# Patient Record
Sex: MALE | Race: WHITE | NOT HISPANIC OR LATINO | URBAN - METROPOLITAN AREA
[De-identification: names, ages, dates, MRNs, and addresses within clinical notes are randomized per-mention and may not be internally consistent; named-entity substitution may affect disease eponyms.]

---

## 2023-04-01 PROCEDURE — 99308 SBSQ NF CARE LOW MDM 20: CPT | Performed by: INTERNAL MEDICINE

## 2023-05-07 ENCOUNTER — NURSING HOME VISIT (OUTPATIENT)
Dept: PRIMARY CARE | Facility: CLINIC | Age: 73
End: 2023-05-07
Payer: MEDICARE

## 2023-05-07 DIAGNOSIS — E11.9 DIABETES MELLITUS WITHOUT COMPLICATION (MULTI): ICD-10-CM

## 2023-05-07 DIAGNOSIS — F32.89 OTHER DEPRESSION: ICD-10-CM

## 2023-05-07 DIAGNOSIS — K21.9 GASTROESOPHAGEAL REFLUX DISEASE WITHOUT ESOPHAGITIS: ICD-10-CM

## 2023-05-07 DIAGNOSIS — I48.91 ATRIAL FIBRILLATION, UNSPECIFIED TYPE (MULTI): Primary | ICD-10-CM

## 2023-05-07 DIAGNOSIS — R53.1 ASTHENIA: ICD-10-CM

## 2023-05-07 DIAGNOSIS — I10 BENIGN HYPERTENSION: ICD-10-CM

## 2023-05-07 PROCEDURE — 99308 SBSQ NF CARE LOW MDM 20: CPT | Performed by: INTERNAL MEDICINE

## 2023-05-17 NOTE — PROGRESS NOTES
NAME OF THE PATIENT: Lucas Valderrama    YOB: 1950    PLACE OF SERVICE:  [facility]    This is a subsequent visit.    HPI:  Mr. Lucas Valderrama is a 73-year-old male with history of depression and atrial fibrillation.  He suffers from diabetes.  He has generalized weakness and deconditioning and unable to care for himself.  He requires supportive care.    PAST MEDICAL HISTORY:  As per nursing home list.  Hypertension, GERD, diabetes, wound dehiscence, atrial fibrillation.    CURRENT MEDICATIONS:  As per nursing home list.  Reviewed.    ALLERGIES:  As per nursing home list.  Allergies to Zetia.    SOCIAL HISTORY:  As per nursing home list.  The patient denies use of alcohol or tobacco at this time.    FAMILY HISTORY:  As per nursing home list.  Positive history of hypertension in this patient's family.    REVIEW OF SYSTEMS:  All 12 systems reviewed and pertaining covered in history and physical, the rest are negative.  The patient denies any fevers, chills, or chest pain at this time.    PHYSICAL EXAMINATION  GENERAL:  This is a well-developed, well-nourished male, lying in bed, appearing weak and lethargic.  VITAL SIGNS:  As recorded and reviewed from EMR.  Blood pressure 130/80.  Pulse 84.  Respirations 16.  Temperature 97.8.  EYES:  The patient's sclerae white.  The pupils were equal and round.  ENT:  The patient's external ears were normal and the otoscopic examination was negative.  NECK:  Supple.  There were no palpable masses.  Thyroid was not enlarged and there were no carotid bruits.  RESPIRATORY:  The patient had normal inspirations and expirations.  The breath sounds were equal bilaterally and clear to auscultation.  CARDIOVASCULAR:  Heart shows irregularly irregular rhythm.  GASTROINTESTINAL:  There was no hepatosplenomegaly.  There were no palpable masses and no inguinal nodes.  LYMPHATIC:  The patient had no axillary, groin, or lymphadenopathy.  MUSCULOSKELETAL:  The patient had  normal gait.  The joints appeared to be normal without evidence of deformity.  Grossly the muscles had good range of motion and were without effusion.  EXTREMITIES:  There was no evidence of peripheral edema.  NEUROLOGIC:  The patient had normal cranial nerves.  The reflexes, sensory, and motor examination were grossly within normal limits.  PSYCHIATRIC: The patient had normal judgment and insight.  The patient was oriented to person, place, and time, and had no obvious mood defect including depression, anxiety, and/or agitation.    LAB WORK:  Laboratory studies reviewed from prior visit.    ASSESSMENT AND PLAN:  Atrial fibrillation, rate controlled.  Depression, on medication.  Diabetes, on insulin.  Hypertension, med controlled.  Reflux disease, on PPI.  Weakness, on PT and OT.  Fall risk, on fall precaution.  Wheelchair dependence.  Medication list reviewed and discussed with the family.  Hospital chart reviewed.

## 2023-06-03 ENCOUNTER — NURSING HOME VISIT (OUTPATIENT)
Dept: POST ACUTE CARE | Facility: EXTERNAL LOCATION | Age: 73
End: 2023-06-03
Payer: OTHER GOVERNMENT

## 2023-06-03 DIAGNOSIS — R41.89 COGNITIVE IMPAIRMENT: ICD-10-CM

## 2023-06-03 DIAGNOSIS — K21.9 GASTROESOPHAGEAL REFLUX DISEASE WITHOUT ESOPHAGITIS: ICD-10-CM

## 2023-06-03 DIAGNOSIS — F32.89 OTHER DEPRESSION: Primary | ICD-10-CM

## 2023-06-03 DIAGNOSIS — R53.1 ASTHENIA: ICD-10-CM

## 2023-06-03 DIAGNOSIS — E11.9 DIABETES MELLITUS WITHOUT COMPLICATION (MULTI): ICD-10-CM

## 2023-06-03 DIAGNOSIS — I10 BENIGN HYPERTENSION: ICD-10-CM

## 2023-06-03 DIAGNOSIS — I48.91 ATRIAL FIBRILLATION, UNSPECIFIED TYPE (MULTI): ICD-10-CM

## 2023-06-03 PROCEDURE — 99308 SBSQ NF CARE LOW MDM 20: CPT | Performed by: INTERNAL MEDICINE

## 2023-06-03 NOTE — LETTER
Patient: Lucas Valderrama  : 1950    Encounter Date: 2023    NAME OF THE PATIENT: Lucas Valderrama     YOB: 1950    PLACE OF SERVICE:  [facility]    This is a subsequent visit.    HPI:  Mr. Lucas Valderrama is a 73-year-old male with history of depression and atrial fibrillation.  He suffers from weakness and deconditioning.  He is unable to care for himself.  He requires supportive care.    PAST MEDICAL HISTORY:  As per nursing home list. Hypertension, GERD, diabetes, wound dehiscence, atrial fibrillation.    CURRENT MEDICATIONS: As per nursing home list. Reviewed.    ALLERGIES: As per nursing home list. Allergies to Zetia.    SOCIAL HISTORY: As per nursing home list. The patient denies use of alcohol or tobacco at this time.    FAMILY HISTORY: As per nursing home list. Positive history of hypertension in this patient's family.    REVIEW OF SYSTEMS: All 12 systems reviewed and pertaining covered in history and physical, the rest are negative. The patient denies any fevers, chills, or chest pain at this time.    PHYSICAL EXAMINATION  GENERAL:  This is a well-developed, well-nourished male, sitting in a chair, in no acute distress.  VITAL SIGNS:  As recorded and reviewed from EMR.  Blood pressure 124/74.  Pulse 78.  Respirations 16.  Temperature 97.6.  EYES:  The patient's sclerae white.  The pupils were equal and round.  ENT:  The patient's external ears were normal and the otoscopic examination was negative.  NECK:  Supple.  There were no palpable masses.  Thyroid was not enlarged and there were no carotid bruits.  RESPIRATORY:  The patient had normal inspirations and expirations.  The breath sounds were equal bilaterally and clear to auscultation.  CARDIOVASCULAR:  Heart shows irregularly irregular rhythm.  GASTROINTESTINAL:  There was no hepatosplenomegaly.  There were no palpable masses and no inguinal nodes.  LYMPHATIC:  The patient had no axillary, groin, or  lymphadenopathy.  MUSCULOSKELETAL:  The patient had normal gait.  The joints appeared to be normal without evidence of deformity.  Grossly the muscles had good range of motion and were without effusion.  EXTREMITIES:  There was no evidence of peripheral edema.  NEUROLOGIC:  The patient had normal cranial nerves.  The reflexes, sensory, and motor examination were grossly within normal limits.  PSYCHIATRIC: The patient had normal judgment and insight.  The patient was oriented to person, place, and time, and had no obvious mood defect including anxiety, and/or agitation.    LAB WORK:  Laboratory studies were reviewed from prior visit.    ASSESSMENT AND PLAN:  Depression, on medication.  Atrial fibrillation, rate controlled.  Diabetes, on insulin.  Hypertension, med controlled.  Cognitive decline, on supportive care.  Reflux disease, on PPI.  Weakness, on PT and OT.  Fall risk, on fall precaution.  Medication list reviewed and discussed with the family.  Hospital chart reviewed.       Electronically Signed By: Santiago Hernandez MD   6/22/23  6:09 PM

## 2023-06-11 ENCOUNTER — NURSING HOME VISIT (OUTPATIENT)
Dept: POST ACUTE CARE | Facility: EXTERNAL LOCATION | Age: 73
End: 2023-06-11
Payer: OTHER GOVERNMENT

## 2023-06-11 DIAGNOSIS — F32.89 OTHER DEPRESSION: Primary | ICD-10-CM

## 2023-06-11 DIAGNOSIS — I10 BENIGN HYPERTENSION: ICD-10-CM

## 2023-06-11 DIAGNOSIS — R41.89 COGNITIVE IMPAIRMENT: ICD-10-CM

## 2023-06-11 DIAGNOSIS — I48.91 ATRIAL FIBRILLATION, UNSPECIFIED TYPE (MULTI): ICD-10-CM

## 2023-06-11 DIAGNOSIS — R53.1 ASTHENIA: ICD-10-CM

## 2023-06-11 DIAGNOSIS — E11.9 DIABETES MELLITUS WITHOUT COMPLICATION (MULTI): ICD-10-CM

## 2023-06-11 DIAGNOSIS — K21.9 GASTROESOPHAGEAL REFLUX DISEASE WITHOUT ESOPHAGITIS: ICD-10-CM

## 2023-06-11 PROCEDURE — 99307 SBSQ NF CARE SF MDM 10: CPT | Performed by: INTERNAL MEDICINE

## 2023-06-11 NOTE — LETTER
Patient: Lucas Valderrama  : 1950    Encounter Date: 2023    NAME OF THE PATIENT: Lucas Valderrama    YOB: 1950    PLACE OF SERVICE:  [facility]    This is a subsequent visit.    HPI:  Mr. Lucas Valderrama is a 73-year-old male with history of atrial fibrillation with diabetes and depression.  He suffers from weakness and deconditioning.  He is unable to care for himself.  He requires supportive care.    PAST MEDICAL HISTORY:  As per nursing home list.  Hypertension, GERD, diabetes, wound dehiscence, atrial fibrillation.    CURRENT MEDICATIONS:  As per nursing home list.  Reviewed.    ALLERGIES:  As per nursing home list.  Allergies to Zetia.    SOCIAL HISTORY:  As per nursing home list.  The patient denies use of alcohol or tobacco at this time.    FAMILY HISTORY:  As per nursing home list.  Positive history of hypertension in this patient's family.    REVIEW OF SYSTEMS:  All 12 systems reviewed and pertaining covered in history and physical, the rest are negative.  The patient denies any fevers, chills, or chest pain at this time.    PHYSICAL EXAMINATION  GENERAL:  This is a well-developed, well-nourished male, sitting in a chair, in no acute distress.  VITAL SIGNS:  As recorded and reviewed from EMR.  Blood pressure 128/76.  Pulse 74.  Respirations 16.  Temperature 97.7.  EYES:  The patient's sclerae white.  The pupils were equal and round.  ENT:  The patient's external ears were normal and the otoscopic examination was negative.  NECK:  Supple.  There were no palpable masses.  Thyroid was not enlarged and there were no carotid bruits.  RESPIRATORY:  The patient had normal inspirations and expirations.  The breath sounds were equal bilaterally and clear to auscultation.  CARDIOVASCULAR:  Heart shows irregularly irregular rhythm.  GASTROINTESTINAL:  There was no hepatosplenomegaly.  There were no palpable masses and no inguinal nodes.  LYMPHATIC:  The patient had no axillary,  groin, or lymphadenopathy.  MUSCULOSKELETAL:  The patient had normal gait.  The joints appeared to be normal without evidence of deformity.  Grossly the muscles had good range of motion and were without effusion.  EXTREMITIES:  There was no evidence of peripheral edema.  NEUROLOGIC:  The patient had normal cranial nerves.  The reflexes, sensory, and motor examination were grossly within normal limits.  PSYCHIATRIC: The patient had normal judgment and insight.  The patient was oriented to person, place, and time, and had no obvious mood defect including depression, anxiety, and/or agitation.    LAB WORK:  Laboratory studies were reviewed from prior visit.    ASSESSMENT AND PLAN:  Depression, on medication.  Atrial fibrillation, rate controlled.  Diabetes, on insulin.  Hypertension, medically controlled.  Reflux disease, on PPI.  Cognitive deficits, on supportive care.  Weakness, on PT/OT.  Fall risk, on fall precautions.  Medication list reviewed and discussed with the family.  Hospital chart reviewed.      Electronically Signed By: Santiago Hernandez MD   8/2/23 10:23 AM

## 2023-06-17 ENCOUNTER — NURSING HOME VISIT (OUTPATIENT)
Dept: POST ACUTE CARE | Facility: EXTERNAL LOCATION | Age: 73
End: 2023-06-17
Payer: OTHER GOVERNMENT

## 2023-06-17 DIAGNOSIS — N18.9 CHRONIC KIDNEY DISEASE, UNSPECIFIED CKD STAGE: ICD-10-CM

## 2023-06-17 DIAGNOSIS — E03.8 OTHER SPECIFIED HYPOTHYROIDISM: ICD-10-CM

## 2023-06-17 DIAGNOSIS — E11.9 DIABETES MELLITUS WITHOUT COMPLICATION (MULTI): ICD-10-CM

## 2023-06-17 DIAGNOSIS — G62.9 NEUROPATHY: ICD-10-CM

## 2023-06-17 DIAGNOSIS — I10 BENIGN HYPERTENSION: ICD-10-CM

## 2023-06-17 DIAGNOSIS — I26.99 PULMONARY EMBOLISM, OTHER, UNSPECIFIED CHRONICITY, UNSPECIFIED WHETHER ACUTE COR PULMONALE PRESENT (MULTI): ICD-10-CM

## 2023-06-17 DIAGNOSIS — R41.89 COGNITIVE IMPAIRMENT: ICD-10-CM

## 2023-06-17 DIAGNOSIS — E78.49 OTHER HYPERLIPIDEMIA: ICD-10-CM

## 2023-06-17 DIAGNOSIS — I48.91 ATRIAL FIBRILLATION, UNSPECIFIED TYPE (MULTI): ICD-10-CM

## 2023-06-17 DIAGNOSIS — M54.9 BACK PAIN, UNSPECIFIED BACK LOCATION, UNSPECIFIED BACK PAIN LATERALITY, UNSPECIFIED CHRONICITY: ICD-10-CM

## 2023-06-17 DIAGNOSIS — R53.1 ASTHENIA: ICD-10-CM

## 2023-06-17 DIAGNOSIS — K21.9 GASTROESOPHAGEAL REFLUX DISEASE WITHOUT ESOPHAGITIS: ICD-10-CM

## 2023-06-17 DIAGNOSIS — I50.9 CONGESTIVE HEART FAILURE, UNSPECIFIED HF CHRONICITY, UNSPECIFIED HEART FAILURE TYPE (MULTI): Primary | ICD-10-CM

## 2023-06-17 PROCEDURE — 99308 SBSQ NF CARE LOW MDM 20: CPT | Performed by: INTERNAL MEDICINE

## 2023-06-17 NOTE — LETTER
Patient: Lucas Valderrama  : 1950    Encounter Date: 2023    NAME OF THE PATIENT: Gerardo Valderrama     YOB: 1960    PLACE OF SERVICE:  [facility]    This is a subsequent visit.    HPI:  Mr. Gerardo Valderrama is a 62-year-old male with a history of heart failure.  He has had atrial fibrillation as well as pulmonary embolism.  He does have weakness and deconditioning.  He was noted to have cognitive deficit.  He is unable to care for himself and requires supportive care.    PAST MEDICAL HISTORY:  As per nursing home list.  Hyponatremia, multiple falls, pulmonary embolism, CKD, diabetes, depression, heart failure, hyperlipidemia, pneumonia, hip pain, thoracic vertebral fracture, back pain, anxiety, neuropathy, gout, kidney stone, and chest mass.    CURRENT MEDICATIONS:  As per nursing home list.  Reviewed.    ALLERGIES:  As per nursing home list.  Reviewed.    SOCIAL HISTORY:  As per nursing home list.  The patient denies use of alcohol or tobacco.    FAMILY HISTORY:  As per nursing home list.  Positive history of hypertension in this patient's family.    REVIEW OF SYSTEMS:  All 12 systems reviewed and pertaining covered in history and physical, the rest are negative.  The patient denies any fevers, chills, or chest pain at this time.    PHYSICAL EXAMINATION  GENERAL: This is a well-developed, well-nourished male, sitting in a chair, in no acute distress.  VITAL SIGNS:  As recorded and reviewed from EMR.  Blood pressure 134/82.  Pulse 86.  Respirations 18.  Temperature 98.0.  EYES:  The patient's sclerae white.  The pupils were equal and round.  ENT:  The patient's external ears were normal and the otoscopic examination was negative.  NECK:  Supple.  There were no palpable masses.  Thyroid was not enlarged and there were no carotid bruits.  RESPIRATORY:  The patient had normal inspirations and expirations.  The breath sounds were equal bilaterally and clear to  auscultation.  CARDIOVASCULAR:  Heart shows (iirregularly irregular) rhythm.  GASTROINTESTINAL:  There was no hepatosplenomegaly.  There were no palpable masses and no inguinal nodes.  LYMPHATIC:  The patient had no axillary, groin, or lymphadenopathy.  MUSCULOSKELETAL:  The patient had normal gait.  The joints appeared to be normal without evidence of deformity.  Grossly the muscles had good range of motion and were without effusion.  EXTREMITIES:  Lower extremities show bilateral lower extremity edema present.  NEUROLOGIC:  The patient had normal cranial nerves.  The reflexes, sensory, and motor examination were grossly within normal limits.  PSYCHIATRIC: The patient had normal judgment and insight.  The patient was oriented to person, place, and time, and had no obvious mood defect including depression, anxiety, and/or agitation.    LAB WORK: Laboratory studies reviewed from prior visit.    ASSESSMENT AND PLAN:  Heart failure, on diuretic.  Atrial fibrillation, rate controlled.  Pulmonary embolism, anti-coagulated.  Weakness, on PT/OT.  Fall risk, on fall precaution.  Diabetes, on insulin.  Neuropathy, on gabapentin.  CKD, monitor BMP.  Hypothyroidism, on levothyroxine.  Back pain, on Tylenol.  Reflux disease, on PPI.  Hyperlipidemia, on statin.  Cognitive decline, supportive care.  Hypertension, med controlled.  Medication list reviewed and discussed with the family.  Hospital chart reviewed.      Electronically Signed By: Santiago Hernandez MD   8/2/23 10:23 AM

## 2023-06-21 PROBLEM — K21.9 GASTROESOPHAGEAL REFLUX DISEASE WITHOUT ESOPHAGITIS: Status: ACTIVE | Noted: 2023-06-21

## 2023-06-21 PROBLEM — I10 BENIGN HYPERTENSION: Status: ACTIVE | Noted: 2023-06-21

## 2023-06-21 PROBLEM — E11.9 DIABETES MELLITUS WITHOUT COMPLICATION (MULTI): Status: ACTIVE | Noted: 2023-06-21

## 2023-06-21 PROBLEM — R53.1 ASTHENIA: Status: ACTIVE | Noted: 2023-06-21

## 2023-06-21 PROBLEM — I48.91 ATRIAL FIBRILLATION (MULTI): Status: ACTIVE | Noted: 2023-06-21

## 2023-06-21 PROBLEM — F32.A DEPRESSION: Status: ACTIVE | Noted: 2023-06-21

## 2023-06-21 PROBLEM — R41.89 COGNITIVE IMPAIRMENT: Status: ACTIVE | Noted: 2023-06-21

## 2023-06-21 NOTE — PROGRESS NOTES
NAME OF THE PATIENT: Lucas Valderrama     YOB: 1950    PLACE OF SERVICE:  [facility]    This is a subsequent visit.    HPI:  Mr. Lucas Valderrama is a 73-year-old male with history of depression and atrial fibrillation.  He suffers from weakness and deconditioning.  He is unable to care for himself.  He requires supportive care.    PAST MEDICAL HISTORY:  As per nursing home list. Hypertension, GERD, diabetes, wound dehiscence, atrial fibrillation.    CURRENT MEDICATIONS: As per nursing home list. Reviewed.    ALLERGIES: As per nursing home list. Allergies to Zetia.    SOCIAL HISTORY: As per nursing home list. The patient denies use of alcohol or tobacco at this time.    FAMILY HISTORY: As per nursing home list. Positive history of hypertension in this patient's family.    REVIEW OF SYSTEMS: All 12 systems reviewed and pertaining covered in history and physical, the rest are negative. The patient denies any fevers, chills, or chest pain at this time.    PHYSICAL EXAMINATION  GENERAL:  This is a well-developed, well-nourished male, sitting in a chair, in no acute distress.  VITAL SIGNS:  As recorded and reviewed from EMR.  Blood pressure 124/74.  Pulse 78.  Respirations 16.  Temperature 97.6.  EYES:  The patient's sclerae white.  The pupils were equal and round.  ENT:  The patient's external ears were normal and the otoscopic examination was negative.  NECK:  Supple.  There were no palpable masses.  Thyroid was not enlarged and there were no carotid bruits.  RESPIRATORY:  The patient had normal inspirations and expirations.  The breath sounds were equal bilaterally and clear to auscultation.  CARDIOVASCULAR:  Heart shows irregularly irregular rhythm.  GASTROINTESTINAL:  There was no hepatosplenomegaly.  There were no palpable masses and no inguinal nodes.  LYMPHATIC:  The patient had no axillary, groin, or lymphadenopathy.  MUSCULOSKELETAL:  The patient had normal gait.  The joints appeared to  be normal without evidence of deformity.  Grossly the muscles had good range of motion and were without effusion.  EXTREMITIES:  There was no evidence of peripheral edema.  NEUROLOGIC:  The patient had normal cranial nerves.  The reflexes, sensory, and motor examination were grossly within normal limits.  PSYCHIATRIC: The patient had normal judgment and insight.  The patient was oriented to person, place, and time, and had no obvious mood defect including anxiety, and/or agitation.    LAB WORK:  Laboratory studies were reviewed from prior visit.    ASSESSMENT AND PLAN:  Depression, on medication.  Atrial fibrillation, rate controlled.  Diabetes, on insulin.  Hypertension, med controlled.  Cognitive decline, on supportive care.  Reflux disease, on PPI.  Weakness, on PT and OT.  Fall risk, on fall precaution.  Medication list reviewed and discussed with the family.  Hospital chart reviewed.

## 2023-07-04 ENCOUNTER — NURSING HOME VISIT (OUTPATIENT)
Dept: POST ACUTE CARE | Facility: EXTERNAL LOCATION | Age: 73
End: 2023-07-04
Payer: MEDICARE

## 2023-07-04 DIAGNOSIS — R41.89 COGNITIVE IMPAIRMENT: ICD-10-CM

## 2023-07-04 DIAGNOSIS — I48.91 ATRIAL FIBRILLATION, UNSPECIFIED TYPE (MULTI): Primary | ICD-10-CM

## 2023-07-04 DIAGNOSIS — K21.9 GASTROESOPHAGEAL REFLUX DISEASE WITHOUT ESOPHAGITIS: ICD-10-CM

## 2023-07-04 DIAGNOSIS — E11.9 DIABETES MELLITUS WITHOUT COMPLICATION (MULTI): ICD-10-CM

## 2023-07-04 DIAGNOSIS — R53.1 ASTHENIA: ICD-10-CM

## 2023-07-04 DIAGNOSIS — F32.89 OTHER DEPRESSION: ICD-10-CM

## 2023-07-04 DIAGNOSIS — I10 BENIGN HYPERTENSION: ICD-10-CM

## 2023-07-04 DIAGNOSIS — Z91.81 AT RISK FOR FALLS: ICD-10-CM

## 2023-07-04 PROCEDURE — 99308 SBSQ NF CARE LOW MDM 20: CPT | Performed by: INTERNAL MEDICINE

## 2023-07-04 NOTE — LETTER
Patient: Lucas Valderrama  : 1950    Encounter Date: 2023    NAME OF THE PATIENT: Lucas Valderrama    YOB: 1950    PLACE OF SERVICE:  [facility]    This is a subsequent visit.    HPI:  Mr. Lucas Valderrama is a 73-year-old male with history of depression with atrial fibrillation.  He suffers from generalized weakness and deconditioning.  He is unable to care for himself.  He requires supportive care.    PAST MEDICAL HISTORY:  As per nursing home list.  Hypertension, GERD, diabetes, wound dehiscence, atrial fibrillation.    CURRENT MEDICATIONS:  As per nursing home list.  Reviewed.    ALLERGIES:  As per nursing home list.  Allergies to Zetia.    SOCIAL HISTORY:  As per nursing home list.  The patient denies use of alcohol or tobacco at this time.    FAMILY HISTORY:  As per nursing home list.  Positive history of hypertension in this patient's family.    REVIEW OF SYSTEMS:  All 12 systems reviewed and pertaining covered in history and physical, the rest are negative. The patient denies any fevers, chills, or chest pain at this time.    PHYSICAL EXAMINATION  GENERAL: This is a well-developed, well-nourished male, sitting in a chair, in no acute distress.  VITAL SIGNS:  As recorded and reviewed from EMR.  Blood pressure 126/76.  Pulse 74.  Respirations 16.  Temperature 97.8.  EYES:  The patient's sclerae white.  The pupils were equal and round.  ENT:  The patient's external ears were normal and the otoscopic examination was negative.  NECK:  Supple.  There were no palpable masses.  Thyroid was not enlarged and there were no carotid bruits.  RESPIRATORY:  The patient had normal inspirations and expirations.  The breath sounds were equal bilaterally and clear to auscultation.  CARDIOVASCULAR:  Heart shows irregularly irregular rhythm.  GASTROINTESTINAL:  There was no hepatosplenomegaly.  There were no palpable masses and no inguinal nodes.  LYMPHATIC:  The patient had no axillary, groin,  or lymphadenopathy.  MUSCULOSKELETAL:  The patient had normal gait.  The joints appeared to be normal without evidence of deformity.  Grossly the muscles had good range of motion and were without effusion.  EXTREMITIES:  There was no evidence of peripheral edema.  NEUROLOGIC:  The patient had normal cranial nerves.  The reflexes, sensory, and motor examination were grossly within normal limits.  PSYCHIATRIC: The patient had normal judgment and insight.  The patient was oriented to person, place, and time, and had no obvious mood defect including depression, anxiety, and/or agitation.    LAB WORK: Laboratory studies were reviewed from prior visit.    ASSESSMENT AND PLAN:  Atrial fibrillation, rate controlled.  Depression, on medication.  Weakness, on PT/OT.  Fall risk, on fall precaution.  Cognitive decline, on supportive care.  Hypertension, med controlled.  Diabetes, on insulin.  Reflux disease, on PPI.  Medication list reviewed and discussed with the family.  Hospital chart reviewed.      Electronically Signed By: Santiago Hernandez MD   8/24/23  6:13 PM

## 2023-07-28 PROBLEM — E03.8 OTHER SPECIFIED HYPOTHYROIDISM: Status: ACTIVE | Noted: 2023-07-28

## 2023-07-28 PROBLEM — M54.9 BACK PAIN: Status: ACTIVE | Noted: 2023-07-28

## 2023-07-28 PROBLEM — I26.99 PULMONARY EMBOLISM (MULTI): Status: ACTIVE | Noted: 2023-07-28

## 2023-07-28 PROBLEM — I50.9 CONGESTIVE HEART FAILURE (MULTI): Status: ACTIVE | Noted: 2023-07-28

## 2023-07-28 PROBLEM — N18.9 CHRONIC KIDNEY DISEASE: Status: ACTIVE | Noted: 2023-07-28

## 2023-07-28 PROBLEM — G62.9 NEUROPATHY: Status: ACTIVE | Noted: 2023-07-28

## 2023-07-28 PROBLEM — E78.49 OTHER HYPERLIPIDEMIA: Status: ACTIVE | Noted: 2023-07-28

## 2023-07-28 NOTE — PROGRESS NOTES
NAME OF THE PATIENT: Lucas Valderrama    YOB: 1950    PLACE OF SERVICE:  [facility]    This is a subsequent visit.    HPI:  Mr. Lucas Valderrama is a 73-year-old male with history of atrial fibrillation with diabetes and depression.  He suffers from weakness and deconditioning.  He is unable to care for himself.  He requires supportive care.    PAST MEDICAL HISTORY:  As per nursing home list.  Hypertension, GERD, diabetes, wound dehiscence, atrial fibrillation.    CURRENT MEDICATIONS:  As per nursing home list.  Reviewed.    ALLERGIES:  As per nursing home list.  Allergies to Zetia.    SOCIAL HISTORY:  As per nursing home list.  The patient denies use of alcohol or tobacco at this time.    FAMILY HISTORY:  As per nursing home list.  Positive history of hypertension in this patient's family.    REVIEW OF SYSTEMS:  All 12 systems reviewed and pertaining covered in history and physical, the rest are negative.  The patient denies any fevers, chills, or chest pain at this time.    PHYSICAL EXAMINATION  GENERAL:  This is a well-developed, well-nourished male, sitting in a chair, in no acute distress.  VITAL SIGNS:  As recorded and reviewed from EMR.  Blood pressure 128/76.  Pulse 74.  Respirations 16.  Temperature 97.7.  EYES:  The patient's sclerae white.  The pupils were equal and round.  ENT:  The patient's external ears were normal and the otoscopic examination was negative.  NECK:  Supple.  There were no palpable masses.  Thyroid was not enlarged and there were no carotid bruits.  RESPIRATORY:  The patient had normal inspirations and expirations.  The breath sounds were equal bilaterally and clear to auscultation.  CARDIOVASCULAR:  Heart shows irregularly irregular rhythm.  GASTROINTESTINAL:  There was no hepatosplenomegaly.  There were no palpable masses and no inguinal nodes.  LYMPHATIC:  The patient had no axillary, groin, or lymphadenopathy.  MUSCULOSKELETAL:  The patient had normal gait.   The joints appeared to be normal without evidence of deformity.  Grossly the muscles had good range of motion and were without effusion.  EXTREMITIES:  There was no evidence of peripheral edema.  NEUROLOGIC:  The patient had normal cranial nerves.  The reflexes, sensory, and motor examination were grossly within normal limits.  PSYCHIATRIC: The patient had normal judgment and insight.  The patient was oriented to person, place, and time, and had no obvious mood defect including depression, anxiety, and/or agitation.    LAB WORK:  Laboratory studies were reviewed from prior visit.    ASSESSMENT AND PLAN:  Depression, on medication.  Atrial fibrillation, rate controlled.  Diabetes, on insulin.  Hypertension, medically controlled.  Reflux disease, on PPI.  Cognitive deficits, on supportive care.  Weakness, on PT/OT.  Fall risk, on fall precautions.  Medication list reviewed and discussed with the family.  Hospital chart reviewed.

## 2023-07-28 NOTE — PROGRESS NOTES
NAME OF THE PATIENT: Gerardo Valderrama     YOB: 1960    PLACE OF SERVICE:  [facility]    This is a subsequent visit.    HPI:  Mr. Gerardo Valderrama is a 62-year-old male with a history of heart failure.  He has had atrial fibrillation as well as pulmonary embolism.  He does have weakness and deconditioning.  He was noted to have cognitive deficit.  He is unable to care for himself and requires supportive care.    PAST MEDICAL HISTORY:  As per nursing home list.  Hyponatremia, multiple falls, pulmonary embolism, CKD, diabetes, depression, heart failure, hyperlipidemia, pneumonia, hip pain, thoracic vertebral fracture, back pain, anxiety, neuropathy, gout, kidney stone, and chest mass.    CURRENT MEDICATIONS:  As per nursing home list.  Reviewed.    ALLERGIES:  As per nursing home list.  Reviewed.    SOCIAL HISTORY:  As per nursing home list.  The patient denies use of alcohol or tobacco.    FAMILY HISTORY:  As per nursing home list.  Positive history of hypertension in this patient's family.    REVIEW OF SYSTEMS:  All 12 systems reviewed and pertaining covered in history and physical, the rest are negative.  The patient denies any fevers, chills, or chest pain at this time.    PHYSICAL EXAMINATION  GENERAL: This is a well-developed, well-nourished male, sitting in a chair, in no acute distress.  VITAL SIGNS:  As recorded and reviewed from EMR.  Blood pressure 134/82.  Pulse 86.  Respirations 18.  Temperature 98.0.  EYES:  The patient's sclerae white.  The pupils were equal and round.  ENT:  The patient's external ears were normal and the otoscopic examination was negative.  NECK:  Supple.  There were no palpable masses.  Thyroid was not enlarged and there were no carotid bruits.  RESPIRATORY:  The patient had normal inspirations and expirations.  The breath sounds were equal bilaterally and clear to auscultation.  CARDIOVASCULAR:  Heart shows (iirregularly irregular)  rhythm.  GASTROINTESTINAL:  There was no hepatosplenomegaly.  There were no palpable masses and no inguinal nodes.  LYMPHATIC:  The patient had no axillary, groin, or lymphadenopathy.  MUSCULOSKELETAL:  The patient had normal gait.  The joints appeared to be normal without evidence of deformity.  Grossly the muscles had good range of motion and were without effusion.  EXTREMITIES:  Lower extremities show bilateral lower extremity edema present.  NEUROLOGIC:  The patient had normal cranial nerves.  The reflexes, sensory, and motor examination were grossly within normal limits.  PSYCHIATRIC: The patient had normal judgment and insight.  The patient was oriented to person, place, and time, and had no obvious mood defect including depression, anxiety, and/or agitation.    LAB WORK: Laboratory studies reviewed from prior visit.    ASSESSMENT AND PLAN:  Heart failure, on diuretic.  Atrial fibrillation, rate controlled.  Pulmonary embolism, anti-coagulated.  Weakness, on PT/OT.  Fall risk, on fall precaution.  Diabetes, on insulin.  Neuropathy, on gabapentin.  CKD, monitor BMP.  Hypothyroidism, on levothyroxine.  Back pain, on Tylenol.  Reflux disease, on PPI.  Hyperlipidemia, on statin.  Cognitive decline, supportive care.  Hypertension, med controlled.  Medication list reviewed and discussed with the family.  Hospital chart reviewed.

## 2023-07-30 ENCOUNTER — NURSING HOME VISIT (OUTPATIENT)
Dept: POST ACUTE CARE | Facility: EXTERNAL LOCATION | Age: 73
End: 2023-07-30
Payer: MEDICARE

## 2023-07-30 DIAGNOSIS — M25.552 LEFT HIP PAIN: Primary | ICD-10-CM

## 2023-07-30 DIAGNOSIS — E11.9 DIABETES MELLITUS WITHOUT COMPLICATION (MULTI): ICD-10-CM

## 2023-07-30 DIAGNOSIS — R53.1 ASTHENIA: ICD-10-CM

## 2023-07-30 DIAGNOSIS — K21.9 GASTROESOPHAGEAL REFLUX DISEASE WITHOUT ESOPHAGITIS: ICD-10-CM

## 2023-07-30 DIAGNOSIS — R41.89 COGNITIVE DECLINE: ICD-10-CM

## 2023-07-30 DIAGNOSIS — F32.89 OTHER DEPRESSION: ICD-10-CM

## 2023-07-30 DIAGNOSIS — I48.91 ATRIAL FIBRILLATION, UNSPECIFIED TYPE (MULTI): ICD-10-CM

## 2023-07-30 DIAGNOSIS — I10 BENIGN HYPERTENSION: ICD-10-CM

## 2023-07-30 PROCEDURE — 99308 SBSQ NF CARE LOW MDM 20: CPT | Performed by: INTERNAL MEDICINE

## 2023-07-30 NOTE — LETTER
Patient: Lucas Vladerrama  : 1950    Encounter Date: 2023    NAME OF THE PATIENT: Lucas Valderrama     YOB: 1950    PLACE OF SERVICE:  [facility]    This is a subsequent visit.    HPI:  Mr. Lucas Valderrama is a 73-year-old male with history of depression with atrial fibrillation.  He is a diabetic.  He has had recent fall and complains of left hip pain.  There is no leg shortening and x-rays will be obtained.    PAST MEDICAL HISTORY:  As per nursing home list. Hypertension, GERD, diabetes, wound dehiscence, atrial fibrillation.    CURRENT MEDICATIONS:  As per nursing home list.  Reviewed.    ALLERGIES:  As per nursing home list.  Allergies to Zetia.    SOCIAL HISTORY:  As per nursing home list.  The patient denies use of alcohol or tobacco at this time.    FAMILY HISTORY:  As per nursing home list.  Positive history of hypertension in this patient's family.    REVIEW OF SYSTEMS:  All 12 systems reviewed and pertaining covered in history and physical, the rest are negative.  The patient denies any fevers, chills, or chest pain at this time.    PHYSICAL EXAMINATION  GENERAL:  This is a well-developed, well-nourished male, lying in bed, appearing weak and lethargic.  VITAL SIGNS:  As recorded and reviewed from EMR.  Blood pressure 134/80.  Pulse 78.  Respirations 18.  Temperature 98.0.  EYES:  The patient's sclerae white.  The pupils were equal and round.  ENT:  The patient's external ears were normal and the otoscopic examination was negative.  NECK:  Supple.  There were no palpable masses.  Thyroid was not enlarged and there were no carotid bruits.  RESPIRATORY:  The patient had normal inspirations and expirations.  The breath sounds were equal bilaterally and clear to auscultation.  CARDIOVASCULAR:  The patient had S1 normal, split S2 without obvious rubs, clicks, or murmurs.  GASTROINTESTINAL:  There was no hepatosplenomegaly.  There were no palpable masses and no inguinal  nodes.  LYMPHATIC:  The patient had no axillary, groin, or lymphadenopathy.  MUSCULOSKELETAL:  The patient had normal gait.  The joints appeared to be normal without evidence of deformity.  Grossly the muscles had good range of motion and were without effusion.  EXTREMITIES:  There is tenderness over the patient's left hip.  There is no leg shortening.  NEUROLOGIC:  The patient had normal cranial nerves.  The reflexes, sensory, and motor examination were grossly within normal limits.  PSYCHIATRIC: The patient had normal judgment and insight.  The patient was oriented to person, place, and time, and had no obvious mood defect including depression, anxiety, and/or agitation.    LAB WORK: Laboratory studies were reviewed from prior visit.    ASSESSMENT AND PLAN:  Left hip pain, we will obtain x-ray.  Atrial fibrillation, rate controlled.  Depression, on medication.  Diabetes, on insulin.  Hypertension, med controlled.  Weakness, on PT/OT.  Fall risk, on fall precaution.  Reflux disease, on PPI.  Cognitive decline, on supportive care.  Depression, on medication.  Medication list reviewed and discussed with the family.  Hospital chart reviewed      Electronically Signed By: Santiago Hernandez MD   8/24/23  6:14 PM

## 2023-08-23 PROBLEM — F41.9 ANXIETY: Status: ACTIVE | Noted: 2023-08-23

## 2023-08-23 PROBLEM — M25.552 LEFT HIP PAIN: Status: ACTIVE | Noted: 2023-08-23

## 2023-08-23 PROBLEM — Z91.81 AT RISK FOR FALLS: Status: ACTIVE | Noted: 2023-08-23

## 2023-08-23 NOTE — PROGRESS NOTES
NAME OF THE PATIENT: Lucas Valderrama    YOB: 1950    PLACE OF SERVICE:  [facility]    This is a subsequent visit.    HPI:  Mr. Lucas Valderrama is a 73-year-old male with history of depression with atrial fibrillation.  He suffers from generalized weakness and deconditioning.  He is unable to care for himself.  He requires supportive care.    PAST MEDICAL HISTORY:  As per nursing home list.  Hypertension, GERD, diabetes, wound dehiscence, atrial fibrillation.    CURRENT MEDICATIONS:  As per nursing home list.  Reviewed.    ALLERGIES:  As per nursing home list.  Allergies to Zetia.    SOCIAL HISTORY:  As per nursing home list.  The patient denies use of alcohol or tobacco at this time.    FAMILY HISTORY:  As per nursing home list.  Positive history of hypertension in this patient's family.    REVIEW OF SYSTEMS:  All 12 systems reviewed and pertaining covered in history and physical, the rest are negative. The patient denies any fevers, chills, or chest pain at this time.    PHYSICAL EXAMINATION  GENERAL: This is a well-developed, well-nourished male, sitting in a chair, in no acute distress.  VITAL SIGNS:  As recorded and reviewed from EMR.  Blood pressure 126/76.  Pulse 74.  Respirations 16.  Temperature 97.8.  EYES:  The patient's sclerae white.  The pupils were equal and round.  ENT:  The patient's external ears were normal and the otoscopic examination was negative.  NECK:  Supple.  There were no palpable masses.  Thyroid was not enlarged and there were no carotid bruits.  RESPIRATORY:  The patient had normal inspirations and expirations.  The breath sounds were equal bilaterally and clear to auscultation.  CARDIOVASCULAR:  Heart shows irregularly irregular rhythm.  GASTROINTESTINAL:  There was no hepatosplenomegaly.  There were no palpable masses and no inguinal nodes.  LYMPHATIC:  The patient had no axillary, groin, or lymphadenopathy.  MUSCULOSKELETAL:  The patient had normal gait.  The  joints appeared to be normal without evidence of deformity.  Grossly the muscles had good range of motion and were without effusion.  EXTREMITIES:  There was no evidence of peripheral edema.  NEUROLOGIC:  The patient had normal cranial nerves.  The reflexes, sensory, and motor examination were grossly within normal limits.  PSYCHIATRIC: The patient had normal judgment and insight.  The patient was oriented to person, place, and time, and had no obvious mood defect including depression, anxiety, and/or agitation.    LAB WORK: Laboratory studies were reviewed from prior visit.    ASSESSMENT AND PLAN:  Atrial fibrillation, rate controlled.  Depression, on medication.  Weakness, on PT/OT.  Fall risk, on fall precaution.  Cognitive decline, on supportive care.  Hypertension, med controlled.  Diabetes, on insulin.  Reflux disease, on PPI.  Medication list reviewed and discussed with the family.  Hospital chart reviewed.

## 2023-08-23 NOTE — PROGRESS NOTES
NAME OF THE PATIENT: Lucas Valderrama     YOB: 1950    PLACE OF SERVICE:  [facility]    This is a subsequent visit.    HPI:  Mr. Lucas Valderrama is a 73-year-old male with history of depression with atrial fibrillation.  He is a diabetic.  He has had recent fall and complains of left hip pain.  There is no leg shortening and x-rays will be obtained.    PAST MEDICAL HISTORY:  As per nursing home list. Hypertension, GERD, diabetes, wound dehiscence, atrial fibrillation.    CURRENT MEDICATIONS:  As per nursing home list.  Reviewed.    ALLERGIES:  As per nursing home list.  Allergies to Zetia.    SOCIAL HISTORY:  As per nursing home list.  The patient denies use of alcohol or tobacco at this time.    FAMILY HISTORY:  As per nursing home list.  Positive history of hypertension in this patient's family.    REVIEW OF SYSTEMS:  All 12 systems reviewed and pertaining covered in history and physical, the rest are negative.  The patient denies any fevers, chills, or chest pain at this time.    PHYSICAL EXAMINATION  GENERAL:  This is a well-developed, well-nourished male, lying in bed, appearing weak and lethargic.  VITAL SIGNS:  As recorded and reviewed from EMR.  Blood pressure 134/80.  Pulse 78.  Respirations 18.  Temperature 98.0.  EYES:  The patient's sclerae white.  The pupils were equal and round.  ENT:  The patient's external ears were normal and the otoscopic examination was negative.  NECK:  Supple.  There were no palpable masses.  Thyroid was not enlarged and there were no carotid bruits.  RESPIRATORY:  The patient had normal inspirations and expirations.  The breath sounds were equal bilaterally and clear to auscultation.  CARDIOVASCULAR:  The patient had S1 normal, split S2 without obvious rubs, clicks, or murmurs.  GASTROINTESTINAL:  There was no hepatosplenomegaly.  There were no palpable masses and no inguinal nodes.  LYMPHATIC:  The patient had no axillary, groin, or  lymphadenopathy.  MUSCULOSKELETAL:  The patient had normal gait.  The joints appeared to be normal without evidence of deformity.  Grossly the muscles had good range of motion and were without effusion.  EXTREMITIES:  There is tenderness over the patient's left hip.  There is no leg shortening.  NEUROLOGIC:  The patient had normal cranial nerves.  The reflexes, sensory, and motor examination were grossly within normal limits.  PSYCHIATRIC: The patient had normal judgment and insight.  The patient was oriented to person, place, and time, and had no obvious mood defect including depression, anxiety, and/or agitation.    LAB WORK: Laboratory studies were reviewed from prior visit.    ASSESSMENT AND PLAN:  Left hip pain, we will obtain x-ray.  Atrial fibrillation, rate controlled.  Depression, on medication.  Diabetes, on insulin.  Hypertension, med controlled.  Weakness, on PT/OT.  Fall risk, on fall precaution.  Reflux disease, on PPI.  Cognitive decline, on supportive care.  Depression, on medication.  Medication list reviewed and discussed with the family.  Hospital chart reviewed

## 2023-09-02 ENCOUNTER — NURSING HOME VISIT (OUTPATIENT)
Dept: POST ACUTE CARE | Facility: EXTERNAL LOCATION | Age: 73
End: 2023-09-02
Payer: MEDICARE

## 2023-09-02 DIAGNOSIS — E11.9 DIABETES MELLITUS WITHOUT COMPLICATION (MULTI): Primary | ICD-10-CM

## 2023-09-02 DIAGNOSIS — F32.89 OTHER DEPRESSION: ICD-10-CM

## 2023-09-02 DIAGNOSIS — K21.9 GASTROESOPHAGEAL REFLUX DISEASE WITHOUT ESOPHAGITIS: ICD-10-CM

## 2023-09-02 DIAGNOSIS — R53.1 ASTHENIA: ICD-10-CM

## 2023-09-02 DIAGNOSIS — I10 BENIGN HYPERTENSION: ICD-10-CM

## 2023-09-02 DIAGNOSIS — R41.89 COGNITIVE IMPAIRMENT: ICD-10-CM

## 2023-09-02 DIAGNOSIS — I48.91 ATRIAL FIBRILLATION, UNSPECIFIED TYPE (MULTI): ICD-10-CM

## 2023-09-02 DIAGNOSIS — Z91.81 AT RISK FOR FALLS: ICD-10-CM

## 2023-09-02 PROCEDURE — 99308 SBSQ NF CARE LOW MDM 20: CPT | Performed by: INTERNAL MEDICINE

## 2023-09-02 NOTE — LETTER
Patient: Lucas Valderrama  : 1950    Encounter Date: 2023    NAME OF THE PATIENT: Lucas Valderrama    YOB: 1950    PLACE OF SERVICE:  [facility].    This is a subsequent visit.    HPI: Mr. Lucas Valderrama is a 73-year-old male with history of atrial fibrillation and diabetes.  He suffers from depression as well as decline in cognition.  He is unable to care for himself and requires supportive care.    PAST MEDICAL HISTORY: As per nursing home list. Hypertension, GERD, diabetes, wound dehiscence, atrial fibrillation.    CURRENT MEDICATIONS: As per nursing home list. Reviewed.    ALLERGIES: As per nursing home list. Allergies to Zetia.    SOCIAL HISTORY: As per nursing home list. The patient denies use of alcohol or tobacco at this time.    FAMILY HISTORY: As per nursing home list. Positive history of hypertension in this patient's family.    REVIEW OF SYSTEMS: All 12 systems reviewed and pertaining covered in history and physical, the rest are negative. The patient denies any fevers, chills, or chest pain at this time.    PHYSICAL EXAMINATION  GENERAL: This is a well-developed, well-nourished male, lying in bed, appearing weak and lethargic.  VITAL SIGNS:  As recorded and reviewed from EMR.  Blood pressure 128/82.  Pulse 74.  Respirations 16.  Temperature 98.1.  EYES:  The patient's sclerae white.  The pupils were equal and round.  ENT:  The patient's external ears were normal and the otoscopic examination was negative.  NECK:  Supple.  There were no palpable masses.  Thyroid was not enlarged and there were no carotid bruits.  RESPIRATORY:  The patient had normal inspirations and expirations.  The breath sounds were equal bilaterally and clear to auscultation.  CARDIOVASCULAR:  Heart shows irregularly irregular rhythm.  GASTROINTESTINAL:  There was no hepatosplenomegaly.  There were no palpable masses and no inguinal nodes.  LYMPHATIC:  The patient had no axillary, groin, or  lymphadenopathy.  MUSCULOSKELETAL:  The patient had normal gait.  The joints appeared to be normal without evidence of deformity.  Grossly the muscles had good range of motion and were without effusion.  EXTREMITIES:  There was no evidence of peripheral edema.  NEUROLOGIC:  The patient had normal cranial nerves.  The reflexes, sensory, and motor examination were grossly within normal limits.  PSYCHIATRIC: The patient had normal judgment and insight.  The patient was oriented to person, place, and time, and had no obvious mood defect including anxiety and/or agitation.    LAB WORK:  Laboratory studies were reviewed from prior visit.    ASSESSMENT AND PLAN:  Diabetes, on insulin.  Atrial fibrillation, rate controlled.  Depression, on medication.  Cognitive decline, on supportive care.  Weakness, on PT/OT.  Fall risk, on fall precaution.  Hypertension, medically controlled.  Reflux disease, on PPI.  Medication list reviewed and discussed with the family.  Hospital chart reviewed      Electronically Signed By: Santiago Hernandez MD   9/28/23  4:34 PM

## 2023-09-03 PROCEDURE — 99232 SBSQ HOSP IP/OBS MODERATE 35: CPT | Performed by: INTERNAL MEDICINE

## 2023-09-04 PROCEDURE — 99232 SBSQ HOSP IP/OBS MODERATE 35: CPT | Performed by: INTERNAL MEDICINE

## 2023-09-17 ENCOUNTER — NURSING HOME VISIT (OUTPATIENT)
Dept: POST ACUTE CARE | Facility: EXTERNAL LOCATION | Age: 73
End: 2023-09-17
Payer: MEDICARE

## 2023-09-17 DIAGNOSIS — I48.91 ATRIAL FIBRILLATION, UNSPECIFIED TYPE (MULTI): ICD-10-CM

## 2023-09-17 DIAGNOSIS — E11.9 DIABETES MELLITUS WITHOUT COMPLICATION (MULTI): Primary | ICD-10-CM

## 2023-09-17 DIAGNOSIS — Z91.81 AT RISK FOR FALLS: ICD-10-CM

## 2023-09-17 DIAGNOSIS — K21.9 GASTROESOPHAGEAL REFLUX DISEASE WITHOUT ESOPHAGITIS: ICD-10-CM

## 2023-09-17 DIAGNOSIS — R53.1 WEAKNESS: ICD-10-CM

## 2023-09-17 DIAGNOSIS — R41.89 COGNITIVE IMPAIRMENT: ICD-10-CM

## 2023-09-17 DIAGNOSIS — I10 BENIGN HYPERTENSION: ICD-10-CM

## 2023-09-17 DIAGNOSIS — F32.89 OTHER DEPRESSION: ICD-10-CM

## 2023-09-17 PROCEDURE — 99308 SBSQ NF CARE LOW MDM 20: CPT | Performed by: INTERNAL MEDICINE

## 2023-09-17 NOTE — LETTER
Patient: Lucas Valderrama  : 1950    Encounter Date: 2023    Subjective  Patient ID: Lucas Valderrama is a 73 y.o. male who presents for Follow-up.    Mr. Lucas Valderrama is a 73-year-old male with history of diabetes and atrial fibrillation.  He suffers from weakness and deconditioning.  He is currently wheelchair-bound.  He is unable to care for himself and requires supportive care.    ALLERGIES: Zetia.    Review of Systems   Constitutional:  Negative for chills and fever.   Cardiovascular:  Negative for chest pain.   All other systems reviewed and are negative.    Objective  /74   Pulse 76   Temp 36.7 °C (98 °F)   Resp 16     Physical Exam  Vitals reviewed.   Constitutional:       General: He is not in acute distress.     Comments: This is a well-developed, well-nourished male, sitting in a chair.   HENT:      Right Ear: Tympanic membrane, ear canal and external ear normal.      Left Ear: Tympanic membrane, ear canal and external ear normal.   Eyes:      General: No scleral icterus.     Pupils: Pupils are equal, round, and reactive to light.   Neck:      Vascular: No carotid bruit.   Cardiovascular:      Heart sounds: Normal heart sounds, S1 normal and S2 normal. No murmur heard.     No friction rub.   Pulmonary:      Effort: Pulmonary effort is normal.      Breath sounds: Normal breath sounds and air entry.   Abdominal:      Palpations: There is no hepatomegaly, splenomegaly or mass.   Musculoskeletal:         General: No swelling or deformity. Normal range of motion.      Cervical back: Neck supple.      Right lower leg: No edema.      Left lower leg: No edema.   Lymphadenopathy:      Cervical: No cervical adenopathy.      Upper Body:      Right upper body: No axillary adenopathy.      Left upper body: No axillary adenopathy.      Lower Body: No right inguinal adenopathy. No left inguinal adenopathy.   Neurological:      Mental Status: He is oriented to person, place, and time.      Cranial  Nerves: Cranial nerves 2-12 are intact. No cranial nerve deficit.      Sensory: No sensory deficit.      Motor: Motor function is intact. No weakness.      Gait: Gait is intact.      Deep Tendon Reflexes: Reflexes normal.   Psychiatric:         Mood and Affect: Mood normal. Mood is not anxious or depressed. Affect is not angry.         Behavior: Behavior is not agitated.         Thought Content: Thought content normal.         Judgment: Judgment normal.     LAB WORK: Laboratory studies were reviewed.    Assessment/Plan  Problem List Items Addressed This Visit             ICD-10-CM       Advance Directives and General Issues    At risk for falls Z91.81       Cardiac and Vasculature    Atrial fibrillation (CMS/[facility]) I48.91    Benign hypertension I10       Endocrine/Metabolic    Diabetes mellitus without complication (CMS/[facility]) - Primary E11.9       Gastrointestinal and Abdominal    Gastroesophageal reflux disease without esophagitis K21.9       Mental Health    Depression F32.A       Neuro    Cognitive impairment R41.89     Other Visit Diagnoses         Codes    Weakness     R53.1        1. Diabetes, on insulin.  2. Atrial fibrillation, rate controlled.  3. Depression, on medication.  4. Weakness, on PT/OT.  5. Fall risk, on fall precaution.  6. Reflux disease, on PPI.  7. Hypertension, medically controlled.  8. Cognitive decline, on supportive care.    Scribe Attestation  By signing my name below, I, Marion Vigil, Scrkadeem   attest that this documentation has been prepared under the direction and in the presence of Santiago Hernandez MD.       Electronically Signed By: Santiago Hernandez MD   11/6/23  4:56 PM

## 2023-09-24 ENCOUNTER — NURSING HOME VISIT (OUTPATIENT)
Dept: POST ACUTE CARE | Facility: EXTERNAL LOCATION | Age: 73
End: 2023-09-24
Payer: MEDICARE

## 2023-09-24 DIAGNOSIS — R41.89 COGNITIVE DECLINE: ICD-10-CM

## 2023-09-24 DIAGNOSIS — R53.1 WEAKNESS: ICD-10-CM

## 2023-09-24 DIAGNOSIS — I50.9 CONGESTIVE HEART FAILURE, UNSPECIFIED HF CHRONICITY, UNSPECIFIED HEART FAILURE TYPE (MULTI): Primary | ICD-10-CM

## 2023-09-24 DIAGNOSIS — E11.9 DIABETES MELLITUS WITHOUT COMPLICATION (MULTI): ICD-10-CM

## 2023-09-24 DIAGNOSIS — F32.89 OTHER DEPRESSION: ICD-10-CM

## 2023-09-24 DIAGNOSIS — J44.9 CHRONIC OBSTRUCTIVE PULMONARY DISEASE, UNSPECIFIED COPD TYPE (MULTI): ICD-10-CM

## 2023-09-24 DIAGNOSIS — Z91.81 AT RISK FOR FALLS: ICD-10-CM

## 2023-09-24 DIAGNOSIS — I48.91 ATRIAL FIBRILLATION, UNSPECIFIED TYPE (MULTI): ICD-10-CM

## 2023-09-24 DIAGNOSIS — I10 BENIGN HYPERTENSION: ICD-10-CM

## 2023-09-24 DIAGNOSIS — K21.9 GASTROESOPHAGEAL REFLUX DISEASE WITHOUT ESOPHAGITIS: ICD-10-CM

## 2023-09-24 PROCEDURE — 99308 SBSQ NF CARE LOW MDM 20: CPT | Performed by: INTERNAL MEDICINE

## 2023-09-24 NOTE — LETTER
Patient: Lucas Valderrama  : 1950    Encounter Date: 2023    Subjective  Patient ID: Lucas Valderrama is a 73 y.o. male who presents for Follow-up.    Mr. Lucas Valderrama is a 73-year-old male with history of congestive heart failure and COPD.  He  suffers from diabetes and depression.  He is unable to care for himself and requires supportive care.    ALLERGIES:  Allergies to Zetia.    Review of Systems   Constitutional:  Negative for chills and fever.   Cardiovascular:  Negative for chest pain.   All other systems reviewed and are negative.    Objective  /82   Pulse 78   Temp 36.7 °C (98.1 °F)   Resp 18     Physical Exam  Vitals reviewed.   Constitutional:       Comments: This is a well-developed, well-nourished male, sitting in a chair.   HENT:      Right Ear: Tympanic membrane, ear canal and external ear normal.      Left Ear: Tympanic membrane, ear canal and external ear normal.   Eyes:      General: No scleral icterus.     Pupils: Pupils are equal, round, and reactive to light.   Neck:      Vascular: No carotid bruit.   Cardiovascular:      Heart sounds: Normal heart sounds, S1 normal and S2 normal. No murmur heard.     No friction rub.   Pulmonary:      Effort: Pulmonary effort is normal.      Breath sounds: Decreased breath sounds (throughout) present.   Abdominal:      Palpations: There is no hepatomegaly, splenomegaly or mass.   Musculoskeletal:         General: No swelling or deformity. Normal range of motion.      Cervical back: Neck supple.      Right lower leg: Edema present.      Left lower leg: Edema present.   Lymphadenopathy:      Cervical: No cervical adenopathy.      Upper Body:      Right upper body: No axillary adenopathy.      Left upper body: No axillary adenopathy.      Lower Body: No right inguinal adenopathy. No left inguinal adenopathy.   Neurological:      Mental Status: He is oriented to person, place, and time.      Cranial Nerves: Cranial nerves 2-12 are intact. No  cranial nerve deficit.      Sensory: No sensory deficit.      Motor: Motor function is intact. No weakness.      Gait: Gait is intact.      Deep Tendon Reflexes: Reflexes normal.   Psychiatric:         Mood and Affect: Mood normal. Mood is not anxious or depressed. Affect is not angry.         Behavior: Behavior is not agitated.         Thought Content: Thought content normal.         Judgment: Judgment normal.     LAB WORK:  Laboratory studies were reviewed.    Assessment/Plan  Problem List Items Addressed This Visit             ICD-10-CM       Advance Directives and General Issues    At risk for falls Z91.81       Cardiac and Vasculature    Atrial fibrillation (CMS/HCC) I48.91    Benign hypertension I10    Congestive heart failure (CMS/HCC) - Primary I50.9       Endocrine/Metabolic    Diabetes mellitus without complication (CMS/HCC) E11.9       Gastrointestinal and Abdominal    Gastroesophageal reflux disease without esophagitis K21.9       Mental Health    Depression F32.A     Other Visit Diagnoses         Codes    Chronic obstructive pulmonary disease, unspecified COPD type (CMS/HCC)     J44.9    Weakness     R53.1    Cognitive decline     R41.89        1. Congestive heart failure, on diuretic.  2. COPD, on bronchodilator therapy.  3. Hypertension, medically controlled.  4. Diabetes, on insulin.  5. Atrial fibrillation.  Rate controlled.  6. Weakness, on PT/OT.  7. Fall risk, on fall precautions.  8. Depression, on medication.  9. Reflux disease, on PPI.  10. Cognitive decline, on supportive care.    Scribe Attestation  By signing my name below, Marion MARSH, Keshav attest that this documentation has been prepared under the direction and in the presence of Santiago Hernandez MD.       Electronically Signed By: Santiago Hernandez MD   10/31/23  2:36 PM

## 2023-09-27 VITALS
TEMPERATURE: 98 F | RESPIRATION RATE: 16 BRPM | SYSTOLIC BLOOD PRESSURE: 130 MMHG | DIASTOLIC BLOOD PRESSURE: 74 MMHG | HEART RATE: 76 BPM

## 2023-09-27 ASSESSMENT — ENCOUNTER SYMPTOMS
FEVER: 0
CHILLS: 0

## 2023-09-27 NOTE — PROGRESS NOTES
NAME OF THE PATIENT: Lucas Valderrama    YOB: 1950    PLACE OF SERVICE:  [facility].    This is a subsequent visit.    HPI: Mr. Lucas Valderrama is a 73-year-old male with history of atrial fibrillation and diabetes.  He suffers from depression as well as decline in cognition.  He is unable to care for himself and requires supportive care.    PAST MEDICAL HISTORY: As per nursing home list. Hypertension, GERD, diabetes, wound dehiscence, atrial fibrillation.    CURRENT MEDICATIONS: As per nursing home list. Reviewed.    ALLERGIES: As per nursing home list. Allergies to Zetia.    SOCIAL HISTORY: As per nursing home list. The patient denies use of alcohol or tobacco at this time.    FAMILY HISTORY: As per nursing home list. Positive history of hypertension in this patient's family.    REVIEW OF SYSTEMS: All 12 systems reviewed and pertaining covered in history and physical, the rest are negative. The patient denies any fevers, chills, or chest pain at this time.    PHYSICAL EXAMINATION  GENERAL: This is a well-developed, well-nourished male, lying in bed, appearing weak and lethargic.  VITAL SIGNS:  As recorded and reviewed from EMR.  Blood pressure 128/82.  Pulse 74.  Respirations 16.  Temperature 98.1.  EYES:  The patient's sclerae white.  The pupils were equal and round.  ENT:  The patient's external ears were normal and the otoscopic examination was negative.  NECK:  Supple.  There were no palpable masses.  Thyroid was not enlarged and there were no carotid bruits.  RESPIRATORY:  The patient had normal inspirations and expirations.  The breath sounds were equal bilaterally and clear to auscultation.  CARDIOVASCULAR:  Heart shows irregularly irregular rhythm.  GASTROINTESTINAL:  There was no hepatosplenomegaly.  There were no palpable masses and no inguinal nodes.  LYMPHATIC:  The patient had no axillary, groin, or lymphadenopathy.  MUSCULOSKELETAL:  The patient had normal gait.  The joints  appeared to be normal without evidence of deformity.  Grossly the muscles had good range of motion and were without effusion.  EXTREMITIES:  There was no evidence of peripheral edema.  NEUROLOGIC:  The patient had normal cranial nerves.  The reflexes, sensory, and motor examination were grossly within normal limits.  PSYCHIATRIC: The patient had normal judgment and insight.  The patient was oriented to person, place, and time, and had no obvious mood defect including anxiety and/or agitation.    LAB WORK:  Laboratory studies were reviewed from prior visit.    ASSESSMENT AND PLAN:  Diabetes, on insulin.  Atrial fibrillation, rate controlled.  Depression, on medication.  Cognitive decline, on supportive care.  Weakness, on PT/OT.  Fall risk, on fall precaution.  Hypertension, medically controlled.  Reflux disease, on PPI.  Medication list reviewed and discussed with the family.  Hospital chart reviewed

## 2023-09-27 NOTE — PROGRESS NOTES
Subjective   Patient ID: Lucas Valderrama is a 73 y.o. male who presents for Follow-up.    Mr. Lucas Valderrama is a 73-year-old male with history of diabetes and atrial fibrillation.  He suffers from weakness and deconditioning.  He is currently wheelchair-bound.  He is unable to care for himself and requires supportive care.    ALLERGIES: Zetia.    Review of Systems   Constitutional:  Negative for chills and fever.   Cardiovascular:  Negative for chest pain.   All other systems reviewed and are negative.    Objective   /74   Pulse 76   Temp 36.7 °C (98 °F)   Resp 16     Physical Exam  Vitals reviewed.   Constitutional:       General: He is not in acute distress.     Comments: This is a well-developed, well-nourished male, sitting in a chair.   HENT:      Right Ear: Tympanic membrane, ear canal and external ear normal.      Left Ear: Tympanic membrane, ear canal and external ear normal.   Eyes:      General: No scleral icterus.     Pupils: Pupils are equal, round, and reactive to light.   Neck:      Vascular: No carotid bruit.   Cardiovascular:      Heart sounds: Normal heart sounds, S1 normal and S2 normal. No murmur heard.     No friction rub.   Pulmonary:      Effort: Pulmonary effort is normal.      Breath sounds: Normal breath sounds and air entry.   Abdominal:      Palpations: There is no hepatomegaly, splenomegaly or mass.   Musculoskeletal:         General: No swelling or deformity. Normal range of motion.      Cervical back: Neck supple.      Right lower leg: No edema.      Left lower leg: No edema.   Lymphadenopathy:      Cervical: No cervical adenopathy.      Upper Body:      Right upper body: No axillary adenopathy.      Left upper body: No axillary adenopathy.      Lower Body: No right inguinal adenopathy. No left inguinal adenopathy.   Neurological:      Mental Status: He is oriented to person, place, and time.      Cranial Nerves: Cranial nerves 2-12 are intact. No cranial nerve deficit.       Sensory: No sensory deficit.      Motor: Motor function is intact. No weakness.      Gait: Gait is intact.      Deep Tendon Reflexes: Reflexes normal.   Psychiatric:         Mood and Affect: Mood normal. Mood is not anxious or depressed. Affect is not angry.         Behavior: Behavior is not agitated.         Thought Content: Thought content normal.         Judgment: Judgment normal.     LAB WORK: Laboratory studies were reviewed.    Assessment/Plan   Problem List Items Addressed This Visit             ICD-10-CM       Advance Directives and General Issues    At risk for falls Z91.81       Cardiac and Vasculature    Atrial fibrillation (CMS/[facility]) I48.91    Benign hypertension I10       Endocrine/Metabolic    Diabetes mellitus without complication (CMS/[facility]) - Primary E11.9       Gastrointestinal and Abdominal    Gastroesophageal reflux disease without esophagitis K21.9       Mental Health    Depression F32.A       Neuro    Cognitive impairment R41.89     Other Visit Diagnoses         Codes    Weakness     R53.1        1. Diabetes, on insulin.  2. Atrial fibrillation, rate controlled.  3. Depression, on medication.  4. Weakness, on PT/OT.  5. Fall risk, on fall precaution.  6. Reflux disease, on PPI.  7. Hypertension, medically controlled.  8. Cognitive decline, on supportive care.    Scribe Attestation  By signing my name below, Marion MARSH, Keshav   attest that this documentation has been prepared under the direction and in the presence of Santiago Hernandez MD.

## 2023-09-30 VITALS
RESPIRATION RATE: 18 BRPM | TEMPERATURE: 98.1 F | SYSTOLIC BLOOD PRESSURE: 128 MMHG | DIASTOLIC BLOOD PRESSURE: 82 MMHG | HEART RATE: 78 BPM

## 2023-09-30 ASSESSMENT — ENCOUNTER SYMPTOMS
CHILLS: 0
FEVER: 0

## 2023-09-30 NOTE — PROGRESS NOTES
Subjective   Patient ID: Lucas Valderrama is a 73 y.o. male who presents for Follow-up.    Mr. Lucas Valderrama is a 73-year-old male with history of congestive heart failure and COPD.  He  suffers from diabetes and depression.  He is unable to care for himself and requires supportive care.    ALLERGIES:  Allergies to Zetia.    Review of Systems   Constitutional:  Negative for chills and fever.   Cardiovascular:  Negative for chest pain.   All other systems reviewed and are negative.    Objective   /82   Pulse 78   Temp 36.7 °C (98.1 °F)   Resp 18     Physical Exam  Vitals reviewed.   Constitutional:       Comments: This is a well-developed, well-nourished male, sitting in a chair.   HENT:      Right Ear: Tympanic membrane, ear canal and external ear normal.      Left Ear: Tympanic membrane, ear canal and external ear normal.   Eyes:      General: No scleral icterus.     Pupils: Pupils are equal, round, and reactive to light.   Neck:      Vascular: No carotid bruit.   Cardiovascular:      Heart sounds: Normal heart sounds, S1 normal and S2 normal. No murmur heard.     No friction rub.   Pulmonary:      Effort: Pulmonary effort is normal.      Breath sounds: Decreased breath sounds (throughout) present.   Abdominal:      Palpations: There is no hepatomegaly, splenomegaly or mass.   Musculoskeletal:         General: No swelling or deformity. Normal range of motion.      Cervical back: Neck supple.      Right lower leg: Edema present.      Left lower leg: Edema present.   Lymphadenopathy:      Cervical: No cervical adenopathy.      Upper Body:      Right upper body: No axillary adenopathy.      Left upper body: No axillary adenopathy.      Lower Body: No right inguinal adenopathy. No left inguinal adenopathy.   Neurological:      Mental Status: He is oriented to person, place, and time.      Cranial Nerves: Cranial nerves 2-12 are intact. No cranial nerve deficit.      Sensory: No sensory deficit.      Motor: Motor  function is intact. No weakness.      Gait: Gait is intact.      Deep Tendon Reflexes: Reflexes normal.   Psychiatric:         Mood and Affect: Mood normal. Mood is not anxious or depressed. Affect is not angry.         Behavior: Behavior is not agitated.         Thought Content: Thought content normal.         Judgment: Judgment normal.     LAB WORK:  Laboratory studies were reviewed.    Assessment/Plan   Problem List Items Addressed This Visit             ICD-10-CM       Advance Directives and General Issues    At risk for falls Z91.81       Cardiac and Vasculature    Atrial fibrillation (CMS/HCC) I48.91    Benign hypertension I10    Congestive heart failure (CMS/HCC) - Primary I50.9       Endocrine/Metabolic    Diabetes mellitus without complication (CMS/HCC) E11.9       Gastrointestinal and Abdominal    Gastroesophageal reflux disease without esophagitis K21.9       Mental Health    Depression F32.A     Other Visit Diagnoses         Codes    Chronic obstructive pulmonary disease, unspecified COPD type (CMS/HCC)     J44.9    Weakness     R53.1    Cognitive decline     R41.89        1. Congestive heart failure, on diuretic.  2. COPD, on bronchodilator therapy.  3. Hypertension, medically controlled.  4. Diabetes, on insulin.  5. Atrial fibrillation.  Rate controlled.  6. Weakness, on PT/OT.  7. Fall risk, on fall precautions.  8. Depression, on medication.  9. Reflux disease, on PPI.  10. Cognitive decline, on supportive care.    Scribe Attestation  By signing my name below, IMarion Scribe attest that this documentation has been prepared under the direction and in the presence of Santiago Hernandez MD.

## 2023-10-01 ENCOUNTER — NURSING HOME VISIT (OUTPATIENT)
Dept: POST ACUTE CARE | Facility: EXTERNAL LOCATION | Age: 73
End: 2023-10-01
Payer: MEDICARE

## 2023-10-01 DIAGNOSIS — E11.9 DIABETES MELLITUS WITHOUT COMPLICATION (MULTI): ICD-10-CM

## 2023-10-01 DIAGNOSIS — I48.91 ATRIAL FIBRILLATION, UNSPECIFIED TYPE (MULTI): ICD-10-CM

## 2023-10-01 DIAGNOSIS — F32.A DEPRESSION, UNSPECIFIED DEPRESSION TYPE: ICD-10-CM

## 2023-10-01 DIAGNOSIS — R41.89 COGNITIVE DECLINE: ICD-10-CM

## 2023-10-01 DIAGNOSIS — Z91.81 AT RISK FOR FALLS: ICD-10-CM

## 2023-10-01 DIAGNOSIS — I50.9 CONGESTIVE HEART FAILURE, UNSPECIFIED HF CHRONICITY, UNSPECIFIED HEART FAILURE TYPE (MULTI): Primary | ICD-10-CM

## 2023-10-01 DIAGNOSIS — I10 BENIGN HYPERTENSION: ICD-10-CM

## 2023-10-01 DIAGNOSIS — R53.1 WEAKNESS: ICD-10-CM

## 2023-10-01 DIAGNOSIS — J44.9 CHRONIC OBSTRUCTIVE PULMONARY DISEASE, UNSPECIFIED COPD TYPE (MULTI): ICD-10-CM

## 2023-10-01 DIAGNOSIS — K21.9 GASTROESOPHAGEAL REFLUX DISEASE WITHOUT ESOPHAGITIS: ICD-10-CM

## 2023-10-01 PROCEDURE — 99308 SBSQ NF CARE LOW MDM 20: CPT | Performed by: INTERNAL MEDICINE

## 2023-10-01 NOTE — LETTER
Patient: Lucas Valderrama  : 1950    Encounter Date: 10/01/2023    Subjective  Patient ID: Lucas Valderrama is a 73 y.o. male who presents for Follow-up (/).    Mr. Lucas Valderrama is a 73-year-old male with history of diabetes and congestive heart failure.  He  does suffer from COPD as well.  He is unable to care for himself and requires supportive care.    ALLERGIES:  Zetia.    Review of Systems   Constitutional:  Negative for chills and fever.   Cardiovascular:  Negative for chest pain.   All other systems reviewed and are negative.    Objective  /80   Pulse 78   Temp 36.7 °C (98 °F)   Resp 18     Physical Exam  Vitals reviewed.   Constitutional:       Comments: This is a well-developed and well-nourished male, lying in bed, appearing weak.   HENT:      Right Ear: Tympanic membrane, ear canal and external ear normal.      Left Ear: Tympanic membrane, ear canal and external ear normal.   Eyes:      General: No scleral icterus.     Pupils: Pupils are equal, round, and reactive to light.   Neck:      Vascular: No carotid bruit.   Cardiovascular:      Heart sounds: Normal heart sounds, S1 normal and S2 normal. No murmur heard.     No friction rub.   Pulmonary:      Effort: Pulmonary effort is normal.      Breath sounds: Decreased breath sounds (throughout) present.   Abdominal:      Palpations: There is no hepatomegaly, splenomegaly or mass.   Musculoskeletal:         General: No swelling or deformity. Normal range of motion.      Cervical back: Neck supple.      Right lower leg: Edema present.      Left lower leg: Edema present.   Lymphadenopathy:      Cervical: No cervical adenopathy.      Upper Body:      Right upper body: No axillary adenopathy.      Left upper body: No axillary adenopathy.      Lower Body: No right inguinal adenopathy. No left inguinal adenopathy.   Neurological:      Mental Status: He is oriented to person, place, and time. He is lethargic.      Cranial Nerves: Cranial nerves 2-12 are  intact. No cranial nerve deficit.      Sensory: No sensory deficit.      Motor: Motor function is intact. No weakness.      Gait: Gait is intact.      Deep Tendon Reflexes: Reflexes normal.   Psychiatric:         Mood and Affect: Mood normal. Mood is not anxious or depressed. Affect is not angry.         Behavior: Behavior is not agitated.         Thought Content: Thought content normal.         Judgment: Judgment normal.     LAB WORK: Laboratory studies were reviewed.    Assessment/Plan  Problem List Items Addressed This Visit             ICD-10-CM       Advance Directives and General Issues    At risk for falls Z91.81       Cardiac and Vasculature    Atrial fibrillation (CMS/HCC) I48.91    Benign hypertension I10    Congestive heart failure (CMS/HCC) - Primary I50.9       Endocrine/Metabolic    Diabetes mellitus without complication (CMS/HCC) E11.9       Gastrointestinal and Abdominal    Gastroesophageal reflux disease without esophagitis K21.9       Mental Health    Depression F32.A     Other Visit Diagnoses         Codes    Chronic obstructive pulmonary disease, unspecified COPD type (CMS/HCC)     J44.9    Weakness     R53.1    Cognitive decline     R41.89        1. Congestive heart failure, on diuretic.  2. COPD, on bronchodilator therapy.  3. Diabetes, on insulin.  4. Atrial fibrillation, rate controlled.  5. Depression, on medication.  6. Hypertension, med controlled.  7. Weakness, on PT/OT.  8. Fall risk, on fall precaution.  9. Reflux disease, on PPI.  10. Cognitive decline, on supportive care.    Scribe Attestation  By signing my name below, ILauren, Scribcristina attest that this documentation has been prepared under the direction and in the presence of Santiago Hernandez MD.       Electronically Signed By: Santiago Hernandez MD   10/31/23  3:26 PM

## 2023-10-07 ENCOUNTER — NURSING HOME VISIT (OUTPATIENT)
Dept: POST ACUTE CARE | Facility: EXTERNAL LOCATION | Age: 73
End: 2023-10-07
Payer: MEDICARE

## 2023-10-07 VITALS
DIASTOLIC BLOOD PRESSURE: 80 MMHG | TEMPERATURE: 98 F | RESPIRATION RATE: 18 BRPM | SYSTOLIC BLOOD PRESSURE: 134 MMHG | HEART RATE: 78 BPM

## 2023-10-07 DIAGNOSIS — I50.9 HEART FAILURE, UNSPECIFIED HF CHRONICITY, UNSPECIFIED HEART FAILURE TYPE (MULTI): Primary | ICD-10-CM

## 2023-10-07 DIAGNOSIS — Z91.81 AT RISK FOR FALLS: ICD-10-CM

## 2023-10-07 DIAGNOSIS — E11.9 DIABETES MELLITUS WITHOUT COMPLICATION (MULTI): ICD-10-CM

## 2023-10-07 DIAGNOSIS — I48.91 ATRIAL FIBRILLATION, UNSPECIFIED TYPE (MULTI): ICD-10-CM

## 2023-10-07 DIAGNOSIS — R53.1 WEAKNESS: ICD-10-CM

## 2023-10-07 DIAGNOSIS — F32.A DEPRESSION, UNSPECIFIED DEPRESSION TYPE: ICD-10-CM

## 2023-10-07 DIAGNOSIS — J44.9 CHRONIC OBSTRUCTIVE PULMONARY DISEASE, UNSPECIFIED COPD TYPE (MULTI): ICD-10-CM

## 2023-10-07 DIAGNOSIS — K21.9 GASTROESOPHAGEAL REFLUX DISEASE WITHOUT ESOPHAGITIS: ICD-10-CM

## 2023-10-07 DIAGNOSIS — I10 BENIGN HYPERTENSION: ICD-10-CM

## 2023-10-07 PROCEDURE — 99308 SBSQ NF CARE LOW MDM 20: CPT | Performed by: INTERNAL MEDICINE

## 2023-10-07 ASSESSMENT — ENCOUNTER SYMPTOMS
FEVER: 0
CHILLS: 0

## 2023-10-07 NOTE — LETTER
Patient: Lucas Valderrama  : 1950    Encounter Date: 10/07/2023    Subjective  Patient ID: Lucas Valderrama is a 73 y.o. male who presents for Follow-up (Multiple medical issues).    Mr. Lucas Valderrama is a 73-year-old male with history of heart failure, diabetes and depression.  He is unable to care for himself and requires supportive care.    ALLERGIES:  Zetia.    Review of Systems   Constitutional:  Negative for chills and fever.   Cardiovascular:  Negative for chest pain.   All other systems reviewed and are negative.    Objective  /80   Pulse 76   Temp 36.6 °C (97.8 °F)   Resp 18     Physical Exam  Vitals reviewed.   Constitutional:       Comments: This is a well-developed, well-nourished male, lying in bed, appearing weak and lethargic.   HENT:      Right Ear: Tympanic membrane, ear canal and external ear normal.      Left Ear: Tympanic membrane, ear canal and external ear normal.   Eyes:      General: No scleral icterus.     Pupils: Pupils are equal, round, and reactive to light.   Neck:      Vascular: No carotid bruit.   Cardiovascular:      Heart sounds: Normal heart sounds, S1 normal and S2 normal. No murmur heard.     No friction rub.   Pulmonary:      Effort: Pulmonary effort is normal.      Breath sounds: Normal breath sounds and air entry.   Abdominal:      Palpations: There is no hepatomegaly, splenomegaly or mass.   Musculoskeletal:         General: No swelling or deformity. Normal range of motion.      Cervical back: Neck supple.      Right lower leg: Edema present.      Left lower leg: Edema present.   Lymphadenopathy:      Cervical: No cervical adenopathy.      Upper Body:      Right upper body: No axillary adenopathy.      Left upper body: No axillary adenopathy.      Lower Body: No right inguinal adenopathy. No left inguinal adenopathy.   Neurological:      Mental Status: He is oriented to person, place, and time.      Cranial Nerves: Cranial nerves 2-12 are intact. No cranial nerve  deficit.      Sensory: No sensory deficit.      Motor: Motor function is intact. No weakness.      Gait: Gait is intact.      Deep Tendon Reflexes: Reflexes normal.   Psychiatric:         Mood and Affect: Mood normal. Mood is not anxious or depressed. Affect is not angry.         Behavior: Behavior is not agitated.         Thought Content: Thought content normal.         Judgment: Judgment normal.     LAB WORK: Laboratory studies were reviewed.    Assessment/Plan  Problem List Items Addressed This Visit             ICD-10-CM       Advance Directives and General Issues    At risk for falls Z91.81       Cardiac and Vasculature    Atrial fibrillation (CMS/[facility]) I48.91    Benign hypertension I10       Endocrine/Metabolic    Diabetes mellitus without complication (CMS/HCC) E11.9       Gastrointestinal and Abdominal    Gastroesophageal reflux disease without esophagitis K21.9       Mental Health    Depression F32.A     Other Visit Diagnoses         Codes    Heart failure, unspecified HF chronicity, unspecified heart failure type (CMS/[facility])    -  Primary I50.9    Chronic obstructive pulmonary disease, unspecified COPD type (CMS/HCC)     J44.9    Weakness     R53.1        1. Heart failure, on diuretic.  2. Diabetes, on insulin.  3. Atrial fibrillation, rate controlled.  4. COPD, on bronchodilator therapy.  5. Weakness, on PT/OT.  6. Fall risk, on fall precautions.  7. Hypertension, medically controlled.  8. Depression, on medication.  9. Reflux disease, on PPI.    Scribe Attestation  By signing my name below, IMarion Scribe attest that this documentation has been prepared under the direction and in the presence of Santiago Hernandez MD.       Electronically Signed By: Santiago Hernandez MD   10/31/23  8:14 PM

## 2023-10-07 NOTE — PROGRESS NOTES
Subjective   Patient ID: Lucas Valderrama is a 73 y.o. male who presents for Follow-up (/).    Mr. Lucas Valderrama is a 73-year-old male with history of diabetes and congestive heart failure.  He  does suffer from COPD as well.  He is unable to care for himself and requires supportive care.    ALLERGIES:  Zetia.    Review of Systems   Constitutional:  Negative for chills and fever.   Cardiovascular:  Negative for chest pain.   All other systems reviewed and are negative.    Objective   /80   Pulse 78   Temp 36.7 °C (98 °F)   Resp 18     Physical Exam  Vitals reviewed.   Constitutional:       Comments: This is a well-developed and well-nourished male, lying in bed, appearing weak.   HENT:      Right Ear: Tympanic membrane, ear canal and external ear normal.      Left Ear: Tympanic membrane, ear canal and external ear normal.   Eyes:      General: No scleral icterus.     Pupils: Pupils are equal, round, and reactive to light.   Neck:      Vascular: No carotid bruit.   Cardiovascular:      Heart sounds: Normal heart sounds, S1 normal and S2 normal. No murmur heard.     No friction rub.   Pulmonary:      Effort: Pulmonary effort is normal.      Breath sounds: Decreased breath sounds (throughout) present.   Abdominal:      Palpations: There is no hepatomegaly, splenomegaly or mass.   Musculoskeletal:         General: No swelling or deformity. Normal range of motion.      Cervical back: Neck supple.      Right lower leg: Edema present.      Left lower leg: Edema present.   Lymphadenopathy:      Cervical: No cervical adenopathy.      Upper Body:      Right upper body: No axillary adenopathy.      Left upper body: No axillary adenopathy.      Lower Body: No right inguinal adenopathy. No left inguinal adenopathy.   Neurological:      Mental Status: He is oriented to person, place, and time. He is lethargic.      Cranial Nerves: Cranial nerves 2-12 are intact. No cranial nerve deficit.      Sensory: No sensory deficit.       Motor: Motor function is intact. No weakness.      Gait: Gait is intact.      Deep Tendon Reflexes: Reflexes normal.   Psychiatric:         Mood and Affect: Mood normal. Mood is not anxious or depressed. Affect is not angry.         Behavior: Behavior is not agitated.         Thought Content: Thought content normal.         Judgment: Judgment normal.     LAB WORK: Laboratory studies were reviewed.    Assessment/Plan   Problem List Items Addressed This Visit             ICD-10-CM       Advance Directives and General Issues    At risk for falls Z91.81       Cardiac and Vasculature    Atrial fibrillation (CMS/HCC) I48.91    Benign hypertension I10    Congestive heart failure (CMS/HCC) - Primary I50.9       Endocrine/Metabolic    Diabetes mellitus without complication (CMS/HCC) E11.9       Gastrointestinal and Abdominal    Gastroesophageal reflux disease without esophagitis K21.9       Mental Health    Depression F32.A     Other Visit Diagnoses         Codes    Chronic obstructive pulmonary disease, unspecified COPD type (CMS/HCC)     J44.9    Weakness     R53.1    Cognitive decline     R41.89        1. Congestive heart failure, on diuretic.  2. COPD, on bronchodilator therapy.  3. Diabetes, on insulin.  4. Atrial fibrillation, rate controlled.  5. Depression, on medication.  6. Hypertension, med controlled.  7. Weakness, on PT/OT.  8. Fall risk, on fall precaution.  9. Reflux disease, on PPI.  10. Cognitive decline, on supportive care.    Scribe Attestation  By signing my name below, ILauren Scribe attest that this documentation has been prepared under the direction and in the presence of Santiago Hernandez MD.   All medical record entries made by the scribe were personally dictated by me I have reviewed the chart and agree the record accurately reflects my personal performance of his history physical examination and management

## 2023-10-14 ENCOUNTER — NURSING HOME VISIT (OUTPATIENT)
Dept: POST ACUTE CARE | Facility: EXTERNAL LOCATION | Age: 73
End: 2023-10-14
Payer: MEDICARE

## 2023-10-14 VITALS
HEART RATE: 76 BPM | SYSTOLIC BLOOD PRESSURE: 124 MMHG | RESPIRATION RATE: 18 BRPM | TEMPERATURE: 97.8 F | DIASTOLIC BLOOD PRESSURE: 80 MMHG

## 2023-10-14 DIAGNOSIS — K21.9 GASTROESOPHAGEAL REFLUX DISEASE WITHOUT ESOPHAGITIS: ICD-10-CM

## 2023-10-14 DIAGNOSIS — F32.A DEPRESSION, UNSPECIFIED DEPRESSION TYPE: ICD-10-CM

## 2023-10-14 DIAGNOSIS — R53.1 WEAKNESS: ICD-10-CM

## 2023-10-14 DIAGNOSIS — Z91.81 AT RISK FOR FALLS: ICD-10-CM

## 2023-10-14 DIAGNOSIS — J44.9 CHRONIC OBSTRUCTIVE PULMONARY DISEASE, UNSPECIFIED COPD TYPE (MULTI): Primary | ICD-10-CM

## 2023-10-14 DIAGNOSIS — I10 BENIGN HYPERTENSION: ICD-10-CM

## 2023-10-14 DIAGNOSIS — I48.91 ATRIAL FIBRILLATION, UNSPECIFIED TYPE (MULTI): ICD-10-CM

## 2023-10-14 DIAGNOSIS — I50.9 CONGESTIVE HEART FAILURE, UNSPECIFIED HF CHRONICITY, UNSPECIFIED HEART FAILURE TYPE (MULTI): ICD-10-CM

## 2023-10-14 DIAGNOSIS — E11.9 DIABETES MELLITUS WITHOUT COMPLICATION (MULTI): ICD-10-CM

## 2023-10-14 PROCEDURE — 99308 SBSQ NF CARE LOW MDM 20: CPT | Performed by: INTERNAL MEDICINE

## 2023-10-14 ASSESSMENT — ENCOUNTER SYMPTOMS
FEVER: 0
CHILLS: 0

## 2023-10-14 NOTE — LETTER
Patient: Lucas Valderrama  : 1950    Encounter Date: 10/14/2023    Subjective  Patient ID: Lucas Valderrama is a 73 y.o. male who presents for Follow-up.    Mr. Lucas Valderrama is a 73-year-old male with history of heart failure, diabetes and COPD.  He is unable to care for himself and manage his affairs.  He requires supportive care.    Review of Systems   Constitutional:  Negative for chills and fever.   Cardiovascular:  Negative for chest pain.   All other systems reviewed and are negative.    Objective  /84   Pulse 78   Temp 36.7 °C (98 °F)   Resp 18     Physical Exam  Vitals reviewed.   Constitutional:       Comments: This is a well-developed, well-nourished male, lying in bed, appearing weak.   HENT:      Right Ear: Tympanic membrane, ear canal and external ear normal.      Left Ear: Tympanic membrane, ear canal and external ear normal.   Eyes:      General: No scleral icterus.     Pupils: Pupils are equal, round, and reactive to light.   Neck:      Vascular: No carotid bruit.   Cardiovascular:      Heart sounds: Normal heart sounds, S1 normal and S2 normal. No murmur heard.     No friction rub.   Pulmonary:      Effort: Pulmonary effort is normal.      Breath sounds: Decreased breath sounds (throughout) present.   Abdominal:      Palpations: There is no hepatomegaly, splenomegaly or mass.   Musculoskeletal:         General: No swelling or deformity. Normal range of motion.      Cervical back: Neck supple.      Right lower leg: Edema present.      Left lower leg: Edema present.   Lymphadenopathy:      Cervical: No cervical adenopathy.      Upper Body:      Right upper body: No axillary adenopathy.      Left upper body: No axillary adenopathy.      Lower Body: No right inguinal adenopathy. No left inguinal adenopathy.   Neurological:      Mental Status: He is oriented to person, place, and time. He is lethargic.      Cranial Nerves: Cranial nerves 2-12 are intact. No cranial nerve deficit.       Sensory: No sensory deficit.      Motor: Motor function is intact. No weakness.      Gait: Gait is intact.      Deep Tendon Reflexes: Reflexes normal.   Psychiatric:         Mood and Affect: Mood normal. Mood is not anxious or depressed. Affect is not angry.         Behavior: Behavior is not agitated.         Thought Content: Thought content normal.         Judgment: Judgment normal.     LAB WORK: Laboratory studies reviewed.    Assessment/Plan  Problem List Items Addressed This Visit             ICD-10-CM       Advance Directives and General Issues    At risk for falls Z91.81       Cardiac and Vasculature    Atrial fibrillation (CMS/HCC) I48.91    Benign hypertension I10    Congestive heart failure (CMS/HCC) I50.9       Endocrine/Metabolic    Diabetes mellitus without complication (CMS/HCC) E11.9       Gastrointestinal and Abdominal    Gastroesophageal reflux disease without esophagitis K21.9       Mental Health    Depression F32.A     Other Visit Diagnoses         Codes    Chronic obstructive pulmonary disease, unspecified COPD type (CMS/HCC)    -  Primary J44.9    Weakness     R53.1        1. COPD, on bronchodilator therapy.  2. Congestive heart failure, on diuretic.  3. Atrial fibrillation, rate controlled.  4. Diabetes, on insulin.  5. Depression, on medication.  6. Hypertension, med controlled.  7. Reflux disease, on PPI.  8. Weakness, on PT, OT.  9. Fall risk, fall precaution.    Scribe Attestation  By signing my name below, ILauren Scribe attest that this documentation has been prepared under the direction and in the presence of Santiago Hernandez MD.       Electronically Signed By: Santiago Hernandez MD   11/1/23 11:52 AM

## 2023-10-15 NOTE — PROGRESS NOTES
.Subjective   Patient ID: Lucas Valderrama is a 73 y.o. male who presents for Follow-up (Multiple medical issues).    Mr. Lucas Valderrama is a 73-year-old male with history of heart failure, diabetes and depression.  He is unable to care for himself and requires supportive care.    ALLERGIES:  Zetia.    Review of Systems   Constitutional:  Negative for chills and fever.   Cardiovascular:  Negative for chest pain.   All other systems reviewed and are negative.    Objective   /80   Pulse 76   Temp 36.6 °C (97.8 °F)   Resp 18     Physical Exam  Vitals reviewed.   Constitutional:       Comments: This is a well-developed, well-nourished male, lying in bed, appearing weak and lethargic.   HENT:      Right Ear: Tympanic membrane, ear canal and external ear normal.      Left Ear: Tympanic membrane, ear canal and external ear normal.   Eyes:      General: No scleral icterus.     Pupils: Pupils are equal, round, and reactive to light.   Neck:      Vascular: No carotid bruit.   Cardiovascular:      Heart sounds: Normal heart sounds, S1 normal and S2 normal. No murmur heard.     No friction rub.   Pulmonary:      Effort: Pulmonary effort is normal.      Breath sounds: Normal breath sounds and air entry.   Abdominal:      Palpations: There is no hepatomegaly, splenomegaly or mass.   Musculoskeletal:         General: No swelling or deformity. Normal range of motion.      Cervical back: Neck supple.      Right lower leg: Edema present.      Left lower leg: Edema present.   Lymphadenopathy:      Cervical: No cervical adenopathy.      Upper Body:      Right upper body: No axillary adenopathy.      Left upper body: No axillary adenopathy.      Lower Body: No right inguinal adenopathy. No left inguinal adenopathy.   Neurological:      Mental Status: He is oriented to person, place, and time.      Cranial Nerves: Cranial nerves 2-12 are intact. No cranial nerve deficit.      Sensory: No sensory deficit.      Motor: Motor function  is intact. No weakness.      Gait: Gait is intact.      Deep Tendon Reflexes: Reflexes normal.   Psychiatric:         Mood and Affect: Mood normal. Mood is not anxious or depressed. Affect is not angry.         Behavior: Behavior is not agitated.         Thought Content: Thought content normal.         Judgment: Judgment normal.     LAB WORK: Laboratory studies were reviewed.    Assessment/Plan   Problem List Items Addressed This Visit             ICD-10-CM       Advance Directives and General Issues    At risk for falls Z91.81       Cardiac and Vasculature    Atrial fibrillation (CMS/HCC) I48.91    Benign hypertension I10       Endocrine/Metabolic    Diabetes mellitus without complication (CMS/HCC) E11.9       Gastrointestinal and Abdominal    Gastroesophageal reflux disease without esophagitis K21.9       Mental Health    Depression F32.A     Other Visit Diagnoses         Codes    Heart failure, unspecified HF chronicity, unspecified heart failure type (CMS/HCC)    -  Primary I50.9    Chronic obstructive pulmonary disease, unspecified COPD type (CMS/HCC)     J44.9    Weakness     R53.1        1. Heart failure, on diuretic.  2. Diabetes, on insulin.  3. Atrial fibrillation, rate controlled.  4. COPD, on bronchodilator therapy.  5. Weakness, on PT/OT.  6. Fall risk, on fall precautions.  7. Hypertension, medically controlled.  8. Depression, on medication.  9. Reflux disease, on PPI.    Scribe Attestation  By signing my name below, IMarion Scribe attest that this documentation has been prepared under the direction and in the presence of Santiago Hernandez MD.     All medical record entries made by the scribe were personally dictated by me I have reviewed the chart and agree the record accurately reflects my personal performance of his history physical examination and management

## 2023-10-18 VITALS
DIASTOLIC BLOOD PRESSURE: 84 MMHG | TEMPERATURE: 98 F | HEART RATE: 78 BPM | RESPIRATION RATE: 18 BRPM | SYSTOLIC BLOOD PRESSURE: 130 MMHG

## 2023-10-18 ASSESSMENT — ENCOUNTER SYMPTOMS
FEVER: 0
CHILLS: 0

## 2023-10-18 NOTE — PROGRESS NOTES
Subjective   Patient ID: Lucas Valderrama is a 73 y.o. male who presents for Follow-up.    Mr. Lucas Valderrama is a 73-year-old male with history of heart failure, diabetes and COPD.  He is unable to care for himself and manage his affairs.  He requires supportive care.    Review of Systems   Constitutional:  Negative for chills and fever.   Cardiovascular:  Negative for chest pain.   All other systems reviewed and are negative.    Objective   /84   Pulse 78   Temp 36.7 °C (98 °F)   Resp 18     Physical Exam  Vitals reviewed.   Constitutional:       Comments: This is a well-developed, well-nourished male, lying in bed, appearing weak.   HENT:      Right Ear: Tympanic membrane, ear canal and external ear normal.      Left Ear: Tympanic membrane, ear canal and external ear normal.   Eyes:      General: No scleral icterus.     Pupils: Pupils are equal, round, and reactive to light.   Neck:      Vascular: No carotid bruit.   Cardiovascular:      Heart sounds: Normal heart sounds, S1 normal and S2 normal. No murmur heard.     No friction rub.   Pulmonary:      Effort: Pulmonary effort is normal.      Breath sounds: Decreased breath sounds (throughout) present.   Abdominal:      Palpations: There is no hepatomegaly, splenomegaly or mass.   Musculoskeletal:         General: No swelling or deformity. Normal range of motion.      Cervical back: Neck supple.      Right lower leg: Edema present.      Left lower leg: Edema present.   Lymphadenopathy:      Cervical: No cervical adenopathy.      Upper Body:      Right upper body: No axillary adenopathy.      Left upper body: No axillary adenopathy.      Lower Body: No right inguinal adenopathy. No left inguinal adenopathy.   Neurological:      Mental Status: He is oriented to person, place, and time. He is lethargic.      Cranial Nerves: Cranial nerves 2-12 are intact. No cranial nerve deficit.      Sensory: No sensory deficit.      Motor: Motor function is intact. No  weakness.      Gait: Gait is intact.      Deep Tendon Reflexes: Reflexes normal.   Psychiatric:         Mood and Affect: Mood normal. Mood is not anxious or depressed. Affect is not angry.         Behavior: Behavior is not agitated.         Thought Content: Thought content normal.         Judgment: Judgment normal.     LAB WORK: Laboratory studies reviewed.    Assessment/Plan   Problem List Items Addressed This Visit             ICD-10-CM       Advance Directives and General Issues    At risk for falls Z91.81       Cardiac and Vasculature    Atrial fibrillation (CMS/HCC) I48.91    Benign hypertension I10    Congestive heart failure (CMS/HCC) I50.9       Endocrine/Metabolic    Diabetes mellitus without complication (CMS/HCC) E11.9       Gastrointestinal and Abdominal    Gastroesophageal reflux disease without esophagitis K21.9       Mental Health    Depression F32.A     Other Visit Diagnoses         Codes    Chronic obstructive pulmonary disease, unspecified COPD type (CMS/HCC)    -  Primary J44.9    Weakness     R53.1        1. COPD, on bronchodilator therapy.  2. Congestive heart failure, on diuretic.  3. Atrial fibrillation, rate controlled.  4. Diabetes, on insulin.  5. Depression, on medication.  6. Hypertension, med controlled.  7. Reflux disease, on PPI.  8. Weakness, on PT, OT.  9. Fall risk, fall precaution.    Scribe Attestation  By signing my name below, I, Keshav Simmons attest that this documentation has been prepared under the direction and in the presence of Santiago Hernandez MD.   All medical record entries made by the scribe were personally dictated by me I have reviewed the chart and agree the record accurately reflects my personal performance of his history physical examination and management

## 2023-11-04 ENCOUNTER — NURSING HOME VISIT (OUTPATIENT)
Dept: POST ACUTE CARE | Facility: EXTERNAL LOCATION | Age: 73
End: 2023-11-04
Payer: MEDICARE

## 2023-11-04 DIAGNOSIS — I10 BENIGN HYPERTENSION: ICD-10-CM

## 2023-11-04 DIAGNOSIS — Z91.81 AT RISK FOR FALLS: ICD-10-CM

## 2023-11-04 DIAGNOSIS — E11.9 DIABETES MELLITUS WITHOUT COMPLICATION (MULTI): ICD-10-CM

## 2023-11-04 DIAGNOSIS — J44.9 CHRONIC OBSTRUCTIVE PULMONARY DISEASE, UNSPECIFIED COPD TYPE (MULTI): ICD-10-CM

## 2023-11-04 DIAGNOSIS — K21.9 GASTROESOPHAGEAL REFLUX DISEASE WITHOUT ESOPHAGITIS: ICD-10-CM

## 2023-11-04 DIAGNOSIS — F32.A DEPRESSION, UNSPECIFIED DEPRESSION TYPE: ICD-10-CM

## 2023-11-04 DIAGNOSIS — I50.9 CONGESTIVE HEART FAILURE, UNSPECIFIED HF CHRONICITY, UNSPECIFIED HEART FAILURE TYPE (MULTI): Primary | ICD-10-CM

## 2023-11-04 DIAGNOSIS — R53.1 WEAKNESS: ICD-10-CM

## 2023-11-04 DIAGNOSIS — I48.91 ATRIAL FIBRILLATION, UNSPECIFIED TYPE (MULTI): ICD-10-CM

## 2023-11-04 PROCEDURE — 99309 SBSQ NF CARE MODERATE MDM 30: CPT | Performed by: INTERNAL MEDICINE

## 2023-11-04 NOTE — LETTER
Patient: Lucas Valderrama  : 1950    Encounter Date: 2023    PLACE OF SERVICE:  [facility]    This is a subsequent visit.    Subjective  Patient ID: Lucas Valderrama is a 73 y.o. male who presents for Follow-up.    Mr. Lucas Valderrama is a 73-year-old male who suffers from congestive heart failure with diabetes and depression.  He is unable to care for himself and requires supportive care.    ALLERGIES:  Zetia.    Review of Systems   Constitutional:  Negative for chills and fever.   Cardiovascular:  Negative for chest pain.   All other systems reviewed and are negative.    Objective  /80   Pulse 76   Temp 36.6 °C (97.9 °F)   Resp 18     Physical Exam  Vitals reviewed.   Constitutional:       Comments: This is a well-developed, well-nourished male, lying in bed, appearing weak.   HENT:      Right Ear: Tympanic membrane, ear canal and external ear normal.      Left Ear: Tympanic membrane, ear canal and external ear normal.   Eyes:      General: No scleral icterus.     Pupils: Pupils are equal, round, and reactive to light.   Neck:      Vascular: No carotid bruit.   Cardiovascular:      Heart sounds: Normal heart sounds, S1 normal and S2 normal. No murmur heard.     No friction rub.   Pulmonary:      Breath sounds: Normal air entry. Wheezing (rare scattered) present.   Abdominal:      Palpations: There is no hepatomegaly, splenomegaly or mass.   Musculoskeletal:         General: No swelling or deformity. Normal range of motion.      Cervical back: Neck supple.      Right lower leg: Edema present.      Left lower leg: Edema present.   Lymphadenopathy:      Cervical: No cervical adenopathy.      Upper Body:      Right upper body: No axillary adenopathy.      Left upper body: No axillary adenopathy.      Lower Body: No right inguinal adenopathy. No left inguinal adenopathy.   Neurological:      Mental Status: He is oriented to person, place, and time. He is lethargic.      Cranial Nerves:  Cranial nerves 2-12 are intact. No cranial nerve deficit.      Sensory: No sensory deficit.      Motor: Motor function is intact. No weakness.      Gait: Gait is intact.      Deep Tendon Reflexes: Reflexes normal.   Psychiatric:         Mood and Affect: Mood normal. Mood is not anxious or depressed. Affect is not angry.         Behavior: Behavior is not agitated.         Thought Content: Thought content normal.         Judgment: Judgment normal.     LAB WORK: Laboratory studies reviewed.    Assessment/Plan  Problem List Items Addressed This Visit             ICD-10-CM       Advance Directives and General Issues    At risk for falls Z91.81       Cardiac and Vasculature    Atrial fibrillation (CMS/[facility]) I48.91    Benign hypertension I10    Congestive heart failure (CMS/HCC) - Primary I50.9       Endocrine/Metabolic    Diabetes mellitus without complication (CMS/HCC) E11.9       Gastrointestinal and Abdominal    Gastroesophageal reflux disease without esophagitis K21.9       Mental Health    Depression F32.A     Other Visit Diagnoses         Codes    Chronic obstructive pulmonary disease, unspecified COPD type (CMS/HCC)     J44.9    Weakness     R53.1        1. Congestive heart failure, on diuretic.  2. COPD, on bronchodilator therapy.  3. Atrial fibrillation, rate controlled.  4. Diabetes, on insulin.  5. Weakness, on PT, OT.  6. Fall risk, on fall precaution.  7. Depression, on medication.  8. Hypertension, med controlled.  9. GERD, on PPI.    Scribe Attestation  By signing my name below, Marion MARSH Scribe attest that this documentation has been prepared under the direction and in the presence of Santiago Hernandez MD.       Electronically Signed By: Santiago Hernandez MD   11/15/23  1:12 PM

## 2023-11-11 VITALS
SYSTOLIC BLOOD PRESSURE: 126 MMHG | RESPIRATION RATE: 18 BRPM | HEART RATE: 76 BPM | DIASTOLIC BLOOD PRESSURE: 80 MMHG | TEMPERATURE: 97.9 F

## 2023-11-11 ASSESSMENT — ENCOUNTER SYMPTOMS
FEVER: 0
CHILLS: 0

## 2023-11-11 NOTE — PROGRESS NOTES
PLACE OF SERVICE:  [facility]    This is a subsequent visit.    Subjective   Patient ID: Lucas Valderrama is a 73 y.o. male who presents for Follow-up.    Mr. Lucas Valderrama is a 73-year-old male who suffers from congestive heart failure with diabetes and depression.  He is unable to care for himself and requires supportive care.    ALLERGIES:  Zetia.    Review of Systems   Constitutional:  Negative for chills and fever.   Cardiovascular:  Negative for chest pain.   All other systems reviewed and are negative.    Objective   /80   Pulse 76   Temp 36.6 °C (97.9 °F)   Resp 18     Physical Exam  Vitals reviewed.   Constitutional:       Comments: This is a well-developed, well-nourished male, lying in bed, appearing weak.   HENT:      Right Ear: Tympanic membrane, ear canal and external ear normal.      Left Ear: Tympanic membrane, ear canal and external ear normal.   Eyes:      General: No scleral icterus.     Pupils: Pupils are equal, round, and reactive to light.   Neck:      Vascular: No carotid bruit.   Cardiovascular:      Heart sounds: Normal heart sounds, S1 normal and S2 normal. No murmur heard.     No friction rub.   Pulmonary:      Breath sounds: Normal air entry. Wheezing (rare scattered) present.   Abdominal:      Palpations: There is no hepatomegaly, splenomegaly or mass.   Musculoskeletal:         General: No swelling or deformity. Normal range of motion.      Cervical back: Neck supple.      Right lower leg: Edema present.      Left lower leg: Edema present.   Lymphadenopathy:      Cervical: No cervical adenopathy.      Upper Body:      Right upper body: No axillary adenopathy.      Left upper body: No axillary adenopathy.      Lower Body: No right inguinal adenopathy. No left inguinal adenopathy.   Neurological:      Mental Status: He is oriented to person, place, and time. He is lethargic.      Cranial Nerves: Cranial nerves 2-12 are intact. No cranial nerve deficit.      Sensory: No  sensory deficit.      Motor: Motor function is intact. No weakness.      Gait: Gait is intact.      Deep Tendon Reflexes: Reflexes normal.   Psychiatric:         Mood and Affect: Mood normal. Mood is not anxious or depressed. Affect is not angry.         Behavior: Behavior is not agitated.         Thought Content: Thought content normal.         Judgment: Judgment normal.     LAB WORK: Laboratory studies reviewed.    Assessment/Plan   Problem List Items Addressed This Visit             ICD-10-CM       Advance Directives and General Issues    At risk for falls Z91.81       Cardiac and Vasculature    Atrial fibrillation (CMS/HCC) I48.91    Benign hypertension I10    Congestive heart failure (CMS/HCC) - Primary I50.9       Endocrine/Metabolic    Diabetes mellitus without complication (CMS/HCC) E11.9       Gastrointestinal and Abdominal    Gastroesophageal reflux disease without esophagitis K21.9       Mental Health    Depression F32.A     Other Visit Diagnoses         Codes    Chronic obstructive pulmonary disease, unspecified COPD type (CMS/HCC)     J44.9    Weakness     R53.1        1. Congestive heart failure, on diuretic.  2. COPD, on bronchodilator therapy.  3. Atrial fibrillation, rate controlled.  4. Diabetes, on insulin.  5. Weakness, on PT, OT.  6. Fall risk, on fall precaution.  7. Depression, on medication.  8. Hypertension, med controlled.  9. GERD, on PPI.    Scribe Attestation  By signing my name below, IMarion Scribe attest that this documentation has been prepared under the direction and in the presence of Santiago Hernandez MD.         All medical record entries made by the scribe were personally dictated by me I have reviewed the chart and agree the record accurately reflects my personal performance of his history physical examination and management

## 2023-12-02 ENCOUNTER — NURSING HOME VISIT (OUTPATIENT)
Dept: POST ACUTE CARE | Facility: EXTERNAL LOCATION | Age: 73
End: 2023-12-02
Payer: MEDICARE

## 2023-12-02 DIAGNOSIS — F32.A DEPRESSION, UNSPECIFIED DEPRESSION TYPE: ICD-10-CM

## 2023-12-02 DIAGNOSIS — Z91.81 AT RISK FOR FALLS: ICD-10-CM

## 2023-12-02 DIAGNOSIS — I48.91 ATRIAL FIBRILLATION, UNSPECIFIED TYPE (MULTI): ICD-10-CM

## 2023-12-02 DIAGNOSIS — K21.9 GASTROESOPHAGEAL REFLUX DISEASE WITHOUT ESOPHAGITIS: ICD-10-CM

## 2023-12-02 DIAGNOSIS — R53.1 WEAKNESS: ICD-10-CM

## 2023-12-02 DIAGNOSIS — R41.89 COGNITIVE DECLINE: ICD-10-CM

## 2023-12-02 DIAGNOSIS — E11.9 DIABETES MELLITUS WITHOUT COMPLICATION (MULTI): ICD-10-CM

## 2023-12-02 DIAGNOSIS — I10 BENIGN HYPERTENSION: ICD-10-CM

## 2023-12-02 DIAGNOSIS — I50.9 CONGESTIVE HEART FAILURE, UNSPECIFIED HF CHRONICITY, UNSPECIFIED HEART FAILURE TYPE (MULTI): Primary | ICD-10-CM

## 2023-12-02 DIAGNOSIS — J44.9 CHRONIC OBSTRUCTIVE PULMONARY DISEASE, UNSPECIFIED COPD TYPE (MULTI): ICD-10-CM

## 2023-12-02 PROCEDURE — 99308 SBSQ NF CARE LOW MDM 20: CPT | Performed by: INTERNAL MEDICINE

## 2023-12-02 NOTE — LETTER
Patient: Lucas Valderrama  : 1950    Encounter Date: 2023    PLACE OF SERVICE:  [facility].    This is a subsequent visit.    Subjective  Patient ID: Lucas Valderrama is a 73 y.o. male who presents for Follow-up.    Mr. Lucas Valderrama is a 73-year-old male with history of diabetes.  He suffers from CHF as well as COPD.  He is unable to care for himself and requires supportive care.    ALLERGIES:  Zetia.    Review of Systems   Constitutional:  Negative for chills and fever.   Cardiovascular:  Negative for chest pain.   All other systems reviewed and are negative.    Objective  /80   Pulse 78   Temp 36.6 °C (97.8 °F)   Resp 18     Physical Exam  Vitals reviewed.   Constitutional:       General: He is not in acute distress.     Comments: This is a well-developed, well-nourished male, sitting in a chair.   HENT:      Right Ear: Tympanic membrane, ear canal and external ear normal.      Left Ear: Tympanic membrane, ear canal and external ear normal.   Eyes:      General: No scleral icterus.     Pupils: Pupils are equal, round, and reactive to light.   Neck:      Vascular: No carotid bruit.   Cardiovascular:      Heart sounds: Normal heart sounds, S1 normal and S2 normal. No murmur heard.     No friction rub.   Pulmonary:      Effort: Pulmonary effort is normal.      Breath sounds: Decreased breath sounds (throughout) present.   Abdominal:      Palpations: There is no hepatomegaly, splenomegaly or mass.   Musculoskeletal:         General: No swelling or deformity. Normal range of motion.      Cervical back: Neck supple.      Right lower leg: Edema present.      Left lower leg: Edema present.   Lymphadenopathy:      Cervical: No cervical adenopathy.      Upper Body:      Right upper body: No axillary adenopathy.      Left upper body: No axillary adenopathy.      Lower Body: No right inguinal adenopathy. No left inguinal adenopathy.   Neurological:      Mental Status: He is oriented to person,  place, and time.      Cranial Nerves: Cranial nerves 2-12 are intact. No cranial nerve deficit.      Sensory: No sensory deficit.      Motor: Motor function is intact. No weakness.      Gait: Gait is intact.      Deep Tendon Reflexes: Reflexes normal.   Psychiatric:         Mood and Affect: Mood normal. Mood is not anxious or depressed. Affect is not angry.         Behavior: Behavior is not agitated.         Thought Content: Thought content normal.         Judgment: Judgment normal.     LAB WORK:  Laboratory studies were reviewed.    Assessment/Plan  Problem List Items Addressed This Visit             ICD-10-CM       Advance Directives and General Issues    At risk for falls Z91.81       Cardiac and Vasculature    Atrial fibrillation (CMS/[facility]) I48.91    Benign hypertension I10    Congestive heart failure (CMS/HCC) - Primary I50.9       Endocrine/Metabolic    Diabetes mellitus without complication (CMS/HCC) E11.9       Gastrointestinal and Abdominal    Gastroesophageal reflux disease without esophagitis K21.9       Mental Health    Depression F32.A     Other Visit Diagnoses         Codes    Chronic obstructive pulmonary disease, unspecified COPD type (CMS/HCC)     J44.9    Weakness     R53.1    Cognitive decline     R41.89        1. Congestive heart failure, on diuretic.  2. COPD, on bronchodilator therapy.  3. Diabetes, on insulin.  4. Atrial fibrillation.  Rate controlled.  5. Depression, on medication.  6. Hypertension, medically controlled.  7. Reflux disease, on PPI.  8. Weakness, on PT/OT.  9. Fall risk, on fall precautions.  10. Cognitive decline, on supportive care.    Scribe Attestation  By signing my name below, I Laurenbari Hendrix, Scribe attest that this documentation has been prepared under the direction and in the presence of Santiago Hernandez MD.       Electronically Signed By: Santiago Hernandez MD   1/9/24  4:49 PM

## 2023-12-09 ENCOUNTER — NURSING HOME VISIT (OUTPATIENT)
Dept: POST ACUTE CARE | Facility: EXTERNAL LOCATION | Age: 73
End: 2023-12-09
Payer: MEDICARE

## 2023-12-09 DIAGNOSIS — Z91.81 AT RISK FOR FALLS: ICD-10-CM

## 2023-12-09 DIAGNOSIS — J44.9 CHRONIC OBSTRUCTIVE PULMONARY DISEASE, UNSPECIFIED COPD TYPE (MULTI): ICD-10-CM

## 2023-12-09 DIAGNOSIS — I50.9 CONGESTIVE HEART FAILURE, UNSPECIFIED HF CHRONICITY, UNSPECIFIED HEART FAILURE TYPE (MULTI): Primary | ICD-10-CM

## 2023-12-09 DIAGNOSIS — K21.9 GASTROESOPHAGEAL REFLUX DISEASE WITHOUT ESOPHAGITIS: ICD-10-CM

## 2023-12-09 DIAGNOSIS — R53.1 WEAKNESS: ICD-10-CM

## 2023-12-09 DIAGNOSIS — I48.91 ATRIAL FIBRILLATION, UNSPECIFIED TYPE (MULTI): ICD-10-CM

## 2023-12-09 DIAGNOSIS — F41.9 ANXIETY: ICD-10-CM

## 2023-12-09 DIAGNOSIS — I10 BENIGN HYPERTENSION: ICD-10-CM

## 2023-12-09 DIAGNOSIS — E11.9 DIABETES MELLITUS WITHOUT COMPLICATION (MULTI): ICD-10-CM

## 2023-12-09 DIAGNOSIS — R41.89 COGNITIVE DECLINE: ICD-10-CM

## 2023-12-09 PROCEDURE — 99309 SBSQ NF CARE MODERATE MDM 30: CPT | Performed by: INTERNAL MEDICINE

## 2023-12-09 NOTE — LETTER
Patient: Lucas Valderrama  : 1950    Encounter Date: 2023    PLACE OF SERVICE:  [facility].    This is a subsequent visit.    Subjective  Patient ID: Lucas Valderrama is a 73 y.o. male who presents for Follow-up.    Mr. Lucas Valderrama is a 73-year-old male diabetic male with history of COPD and congestive heart failure.  He has a history of cognitive decline.  He is unable to care for himself and requires supportive care.    Review of Systems   Constitutional:  Negative for chills and fever.   Cardiovascular:  Negative for chest pain.   All other systems reviewed and are negative.    Objective  /82   Pulse 80   Temp 36.6 °C (97.9 °F)   Resp 18     Physical Exam  Vitals reviewed.   Constitutional:       Comments: This is a well-developed and well-nourished male, lying in bed, appearing weak.   HENT:      Right Ear: Tympanic membrane, ear canal and external ear normal.      Left Ear: Tympanic membrane, ear canal and external ear normal.   Eyes:      General: No scleral icterus.     Pupils: Pupils are equal, round, and reactive to light.   Neck:      Vascular: No carotid bruit.   Cardiovascular:      Heart sounds: Normal heart sounds, S1 normal and S2 normal. No murmur heard.     No friction rub.   Pulmonary:      Effort: Pulmonary effort is normal.      Breath sounds: Decreased breath sounds (throughout) present.   Abdominal:      Palpations: There is no hepatomegaly, splenomegaly or mass.   Musculoskeletal:         General: No swelling or deformity. Normal range of motion.      Cervical back: Neck supple.      Right lower leg: Edema present.      Left lower leg: Edema present.   Lymphadenopathy:      Cervical: No cervical adenopathy.      Upper Body:      Right upper body: No axillary adenopathy.      Left upper body: No axillary adenopathy.      Lower Body: No right inguinal adenopathy. No left inguinal adenopathy.   Neurological:      Mental Status: He is oriented to person, place, and  time. He is lethargic.      Cranial Nerves: Cranial nerves 2-12 are intact. No cranial nerve deficit.      Sensory: No sensory deficit.      Motor: Motor function is intact. No weakness.      Gait: Gait is intact.      Deep Tendon Reflexes: Reflexes normal.   Psychiatric:         Mood and Affect: Mood normal. Mood is not anxious or depressed. Affect is not angry.         Behavior: Behavior is not agitated.         Thought Content: Thought content normal.         Judgment: Judgment normal.     LAB WORK: Laboratory studies were reviewed.    Assessment/Plan  Problem List Items Addressed This Visit             ICD-10-CM       Advance Directives and General Issues    At risk for falls Z91.81       Cardiac and Vasculature    Atrial fibrillation (CMS/HCC) I48.91    Benign hypertension I10    Congestive heart failure (CMS/HCC) - Primary I50.9       Endocrine/Metabolic    Diabetes mellitus without complication (CMS/HCC) E11.9       Gastrointestinal and Abdominal    Gastroesophageal reflux disease without esophagitis K21.9       Mental Health    Anxiety F41.9     Other Visit Diagnoses         Codes    Chronic obstructive pulmonary disease, unspecified COPD type (CMS/HCC)     J44.9    Cognitive decline     R41.89    Weakness     R53.1        1. Congestive heart failure, on diuretic.  2. COPD, on bronchodilator therapy.  3. Atrial fibrillation, rate controlled.  4. Cognitive decline, on supportive care.  5. Diabetes, on insulin.  6. Anxiety, on medication.  7. Hypertension, med controlled.  8. Weakness, on PT/OT.  9. Fall risk, on fall precaution.  10. Reflux disease, on PPI.    Scribe Attestation  By signing my name below, I, Keshav Simmons attest that this documentation has been prepared under the direction and in the presence of Santiago Hernandez MD.   All medical record entries made by the scribe were personally dictated by me I have reviewed the chart and agree the record accurately reflects my personal performance of  his history physical examination and management      Electronically Signed By: Santiago Hernandez MD   12/19/23  4:13 PM

## 2023-12-16 ENCOUNTER — NURSING HOME VISIT (OUTPATIENT)
Dept: POST ACUTE CARE | Facility: EXTERNAL LOCATION | Age: 73
End: 2023-12-16
Payer: MEDICARE

## 2023-12-16 VITALS
DIASTOLIC BLOOD PRESSURE: 82 MMHG | HEART RATE: 80 BPM | RESPIRATION RATE: 18 BRPM | SYSTOLIC BLOOD PRESSURE: 128 MMHG | TEMPERATURE: 97.9 F

## 2023-12-16 DIAGNOSIS — I48.91 ATRIAL FIBRILLATION, UNSPECIFIED TYPE (MULTI): ICD-10-CM

## 2023-12-16 DIAGNOSIS — F32.A DEPRESSION, UNSPECIFIED DEPRESSION TYPE: ICD-10-CM

## 2023-12-16 DIAGNOSIS — I10 BENIGN HYPERTENSION: ICD-10-CM

## 2023-12-16 DIAGNOSIS — Z79.51 COPD ON LONG-TERM INHALED STEROID THERAPY (MULTI): ICD-10-CM

## 2023-12-16 DIAGNOSIS — I50.9 CONGESTIVE HEART FAILURE, UNSPECIFIED HF CHRONICITY, UNSPECIFIED HEART FAILURE TYPE (MULTI): Primary | ICD-10-CM

## 2023-12-16 DIAGNOSIS — K21.9 GASTROESOPHAGEAL REFLUX DISEASE WITHOUT ESOPHAGITIS: ICD-10-CM

## 2023-12-16 DIAGNOSIS — R53.1 WEAKNESS: ICD-10-CM

## 2023-12-16 DIAGNOSIS — E11.9 TYPE 2 DIABETES MELLITUS WITHOUT COMPLICATION, WITH LONG-TERM CURRENT USE OF INSULIN (MULTI): ICD-10-CM

## 2023-12-16 DIAGNOSIS — Z91.81 AT RISK FOR FALLS: ICD-10-CM

## 2023-12-16 DIAGNOSIS — Z79.4 TYPE 2 DIABETES MELLITUS WITHOUT COMPLICATION, WITH LONG-TERM CURRENT USE OF INSULIN (MULTI): ICD-10-CM

## 2023-12-16 DIAGNOSIS — J44.9 COPD ON LONG-TERM INHALED STEROID THERAPY (MULTI): ICD-10-CM

## 2023-12-16 PROCEDURE — 99309 SBSQ NF CARE MODERATE MDM 30: CPT | Performed by: INTERNAL MEDICINE

## 2023-12-16 ASSESSMENT — ENCOUNTER SYMPTOMS
CHILLS: 0
FEVER: 0

## 2023-12-16 NOTE — PROGRESS NOTES
PLACE OF SERVICE:  [facility].    This is a subsequent visit.    Subjective   Patient ID: Lucas Valderrama is a 73 y.o. male who presents for Follow-up.    Mr. Lucas Valderrama is a 73-year-old male diabetic male with history of COPD and congestive heart failure.  He has a history of cognitive decline.  He is unable to care for himself and requires supportive care.    Review of Systems   Constitutional:  Negative for chills and fever.   Cardiovascular:  Negative for chest pain.   All other systems reviewed and are negative.    Objective   /82   Pulse 80   Temp 36.6 °C (97.9 °F)   Resp 18     Physical Exam  Vitals reviewed.   Constitutional:       Comments: This is a well-developed and well-nourished male, lying in bed, appearing weak.   HENT:      Right Ear: Tympanic membrane, ear canal and external ear normal.      Left Ear: Tympanic membrane, ear canal and external ear normal.   Eyes:      General: No scleral icterus.     Pupils: Pupils are equal, round, and reactive to light.   Neck:      Vascular: No carotid bruit.   Cardiovascular:      Heart sounds: Normal heart sounds, S1 normal and S2 normal. No murmur heard.     No friction rub.   Pulmonary:      Effort: Pulmonary effort is normal.      Breath sounds: Decreased breath sounds (throughout) present.   Abdominal:      Palpations: There is no hepatomegaly, splenomegaly or mass.   Musculoskeletal:         General: No swelling or deformity. Normal range of motion.      Cervical back: Neck supple.      Right lower leg: Edema present.      Left lower leg: Edema present.   Lymphadenopathy:      Cervical: No cervical adenopathy.      Upper Body:      Right upper body: No axillary adenopathy.      Left upper body: No axillary adenopathy.      Lower Body: No right inguinal adenopathy. No left inguinal adenopathy.   Neurological:      Mental Status: He is oriented to person, place, and time. He is lethargic.      Cranial Nerves: Cranial nerves 2-12 are  intact. No cranial nerve deficit.      Sensory: No sensory deficit.      Motor: Motor function is intact. No weakness.      Gait: Gait is intact.      Deep Tendon Reflexes: Reflexes normal.   Psychiatric:         Mood and Affect: Mood normal. Mood is not anxious or depressed. Affect is not angry.         Behavior: Behavior is not agitated.         Thought Content: Thought content normal.         Judgment: Judgment normal.     LAB WORK: Laboratory studies were reviewed.    Assessment/Plan   Problem List Items Addressed This Visit             ICD-10-CM       Advance Directives and General Issues    At risk for falls Z91.81       Cardiac and Vasculature    Atrial fibrillation (CMS/HCC) I48.91    Benign hypertension I10    Congestive heart failure (CMS/HCC) - Primary I50.9       Endocrine/Metabolic    Diabetes mellitus without complication (CMS/HCC) E11.9       Gastrointestinal and Abdominal    Gastroesophageal reflux disease without esophagitis K21.9       Mental Health    Anxiety F41.9     Other Visit Diagnoses         Codes    Chronic obstructive pulmonary disease, unspecified COPD type (CMS/HCC)     J44.9    Cognitive decline     R41.89    Weakness     R53.1        1. Congestive heart failure, on diuretic.  2. COPD, on bronchodilator therapy.  3. Atrial fibrillation, rate controlled.  4. Cognitive decline, on supportive care.  5. Diabetes, on insulin.  6. Anxiety, on medication.  7. Hypertension, med controlled.  8. Weakness, on PT/OT.  9. Fall risk, on fall precaution.  10. Reflux disease, on PPI.    Scribe Attestation  By signing my name below, I, Keshav Simmons attest that this documentation has been prepared under the direction and in the presence of Santiago Hernandez MD.   All medical record entries made by the scribe were personally dictated by me I have reviewed the chart and agree the record accurately reflects my personal performance of his history physical examination and management

## 2023-12-16 NOTE — LETTER
Patient: Lucas Valderrama  : 1950    Encounter Date: 2023    PLACE OF SERVICE:  [facility], [place], operated [facility]    This is a subsequent visit.    Subjective  Patient ID: Lucas Valderrama is a 73 y.o. male who presents for Follow-up.    Mr. Lucas Valderrama is a 73-year-old male with history of diabetes.  He suffers from CHF and COPD.  He is unable to care for himself and requires supportive care.    Review of Systems   Constitutional:  Negative for chills and fever.   Cardiovascular:  Negative for chest pain.   All other systems reviewed and are negative.    Objective  /84   Pulse 80   Temp 36.7 °C (98.1 °F)   Resp 18     Physical Exam  Vitals reviewed.   Constitutional:       Comments: This is a well-developed, well-nourished male, lying in bed, appearing weak and lethargic.   HENT:      Right Ear: Tympanic membrane, ear canal and external ear normal.      Left Ear: Tympanic membrane, ear canal and external ear normal.   Eyes:      General: No scleral icterus.     Pupils: Pupils are equal, round, and reactive to light.   Neck:      Vascular: No carotid bruit.   Cardiovascular:      Heart sounds: Normal heart sounds, S1 normal and S2 normal. No murmur heard.     No friction rub.   Pulmonary:      Effort: Pulmonary effort is normal.      Breath sounds: Decreased breath sounds (throughout.) present.   Abdominal:      Palpations: There is no hepatomegaly, splenomegaly or mass.   Musculoskeletal:         General: No swelling or deformity. Normal range of motion.      Cervical back: Neck supple.      Right lower leg: Edema present.      Left lower leg: Edema present.   Lymphadenopathy:      Cervical: No cervical adenopathy.      Upper Body:      Right upper body: No axillary adenopathy.      Left upper body: No axillary adenopathy.      Lower Body: No right inguinal adenopathy. No left inguinal adenopathy.   Neurological:      Mental Status: He is oriented to person, place, and time.      Cranial Nerves: Cranial  nerves 2-12 are intact. No cranial nerve deficit.      Sensory: No sensory deficit.      Motor: Motor function is intact. No weakness.      Gait: Gait is intact.      Deep Tendon Reflexes: Reflexes normal.   Psychiatric:         Mood and Affect: Mood normal. Mood is not anxious or depressed. Affect is not angry.         Behavior: Behavior is not agitated.         Thought Content: Thought content normal.         Judgment: Judgment normal.     LAB WORK: Laboratory studies were reviewed.    Assessment/Plan  Problem List Items Addressed This Visit             ICD-10-CM       Advance Directives and General Issues    At risk for falls Z91.81       Cardiac and Vasculature    Atrial fibrillation (CMS/HCC) I48.91    Benign hypertension I10    Congestive heart failure (CMS/HCC) - Primary I50.9       Gastrointestinal and Abdominal    Gastroesophageal reflux disease without esophagitis K21.9       Mental Health    Depression F32.A     Other Visit Diagnoses         Codes    COPD on long-term inhaled steroid therapy (CMS/HCC)     J44.9, Z79.51    Type 2 diabetes mellitus without complication, with long-term current use of insulin (CMS/HCC)     E11.9, Z79.4    Weakness     R53.1        1. Congestive heart failure, on diuretic.  2. COPD, on bronchodilator therapy.  3. Atrial fibrillation, rate controlled.  4. Diabetes, on insulin.  5. Weakness, on PT/OT.  6. Fall risk, on fall precaution.  7. Hypertension, med controlled.  8. Depression, on medication.  9. Reflux disease, on PPI.    Scribe Attestation  By signing my name below, IMarion, Scrkadeem attest that this documentation has been prepared under the direction and in the presence of Santiago Hernandez MD.       Electronically Signed By: Santiago Hernandez MD   12/26/23  6:57 AM

## 2023-12-22 VITALS
TEMPERATURE: 98.1 F | DIASTOLIC BLOOD PRESSURE: 84 MMHG | RESPIRATION RATE: 18 BRPM | SYSTOLIC BLOOD PRESSURE: 132 MMHG | HEART RATE: 80 BPM

## 2023-12-22 ASSESSMENT — ENCOUNTER SYMPTOMS
CHILLS: 0
FEVER: 0

## 2023-12-22 NOTE — PROGRESS NOTES
PLACE OF SERVICE:  [facility]    This is a subsequent visit.    Subjective   Patient ID: Lucas Valderrama is a 73 y.o. male who presents for Follow-up.    Mr. Lucas Valderrama is a 73-year-old male with history of diabetes.  He suffers from CHF and COPD.  He is unable to care for himself and requires supportive care.    Review of Systems   Constitutional:  Negative for chills and fever.   Cardiovascular:  Negative for chest pain.   All other systems reviewed and are negative.    Objective   /84   Pulse 80   Temp 36.7 °C (98.1 °F)   Resp 18     Physical Exam  Vitals reviewed.   Constitutional:       Comments: This is a well-developed, well-nourished male, lying in bed, appearing weak and lethargic.   HENT:      Right Ear: Tympanic membrane, ear canal and external ear normal.      Left Ear: Tympanic membrane, ear canal and external ear normal.   Eyes:      General: No scleral icterus.     Pupils: Pupils are equal, round, and reactive to light.   Neck:      Vascular: No carotid bruit.   Cardiovascular:      Heart sounds: Normal heart sounds, S1 normal and S2 normal. No murmur heard.     No friction rub.   Pulmonary:      Effort: Pulmonary effort is normal.      Breath sounds: Decreased breath sounds (throughout.) present.   Abdominal:      Palpations: There is no hepatomegaly, splenomegaly or mass.   Musculoskeletal:         General: No swelling or deformity. Normal range of motion.      Cervical back: Neck supple.      Right lower leg: Edema present.      Left lower leg: Edema present.   Lymphadenopathy:      Cervical: No cervical adenopathy.      Upper Body:      Right upper body: No axillary adenopathy.      Left upper body: No axillary adenopathy.      Lower Body: No right inguinal adenopathy. No left inguinal adenopathy.   Neurological:      Mental Status: He is oriented to person, place, and time.      Cranial Nerves: Cranial nerves 2-12 are intact. No cranial nerve deficit.      Sensory: No  sensory deficit.      Motor: Motor function is intact. No weakness.      Gait: Gait is intact.      Deep Tendon Reflexes: Reflexes normal.   Psychiatric:         Mood and Affect: Mood normal. Mood is not anxious or depressed. Affect is not angry.         Behavior: Behavior is not agitated.         Thought Content: Thought content normal.         Judgment: Judgment normal.     LAB WORK: Laboratory studies were reviewed.    Assessment/Plan   Problem List Items Addressed This Visit             ICD-10-CM       Advance Directives and General Issues    At risk for falls Z91.81       Cardiac and Vasculature    Atrial fibrillation (CMS/HCC) I48.91    Benign hypertension I10    Congestive heart failure (CMS/HCC) - Primary I50.9       Gastrointestinal and Abdominal    Gastroesophageal reflux disease without esophagitis K21.9       Mental Health    Depression F32.A     Other Visit Diagnoses         Codes    COPD on long-term inhaled steroid therapy (CMS/HCC)     J44.9, Z79.51    Type 2 diabetes mellitus without complication, with long-term current use of insulin (CMS/HCC)     E11.9, Z79.4    Weakness     R53.1        1. Congestive heart failure, on diuretic.  2. COPD, on bronchodilator therapy.  3. Atrial fibrillation, rate controlled.  4. Diabetes, on insulin.  5. Weakness, on PT/OT.  6. Fall risk, on fall precaution.  7. Hypertension, med controlled.  8. Depression, on medication.  9. Reflux disease, on PPI.    Scribe Attestation  By signing my name below, IMarion Scribe attest that this documentation has been prepared under the direction and in the presence of Santiago Hernandez MD.     All medical record entries made by the scribe were personally dictated by me I have reviewed the chart and agree the record accurately reflects my personal performance of his history physical examination and management

## 2023-12-23 ENCOUNTER — NURSING HOME VISIT (OUTPATIENT)
Dept: POST ACUTE CARE | Facility: EXTERNAL LOCATION | Age: 73
End: 2023-12-23
Payer: MEDICARE

## 2023-12-23 DIAGNOSIS — E11.9 TYPE 2 DIABETES MELLITUS WITHOUT COMPLICATION, WITH LONG-TERM CURRENT USE OF INSULIN (MULTI): ICD-10-CM

## 2023-12-23 DIAGNOSIS — F32.A DEPRESSION, UNSPECIFIED DEPRESSION TYPE: ICD-10-CM

## 2023-12-23 DIAGNOSIS — K21.9 GASTROESOPHAGEAL REFLUX DISEASE WITHOUT ESOPHAGITIS: ICD-10-CM

## 2023-12-23 DIAGNOSIS — I10 BENIGN HYPERTENSION: ICD-10-CM

## 2023-12-23 DIAGNOSIS — I50.9 CONGESTIVE HEART FAILURE, UNSPECIFIED HF CHRONICITY, UNSPECIFIED HEART FAILURE TYPE (MULTI): Primary | ICD-10-CM

## 2023-12-23 DIAGNOSIS — J44.9 COPD ON LONG-TERM INHALED STEROID THERAPY (MULTI): ICD-10-CM

## 2023-12-23 DIAGNOSIS — Z91.81 AT RISK FOR FALLS: ICD-10-CM

## 2023-12-23 DIAGNOSIS — I48.91 ATRIAL FIBRILLATION, UNSPECIFIED TYPE (MULTI): ICD-10-CM

## 2023-12-23 DIAGNOSIS — Z79.4 TYPE 2 DIABETES MELLITUS WITHOUT COMPLICATION, WITH LONG-TERM CURRENT USE OF INSULIN (MULTI): ICD-10-CM

## 2023-12-23 DIAGNOSIS — Z79.51 COPD ON LONG-TERM INHALED STEROID THERAPY (MULTI): ICD-10-CM

## 2023-12-23 DIAGNOSIS — R53.1 WEAKNESS: ICD-10-CM

## 2023-12-23 PROCEDURE — 99308 SBSQ NF CARE LOW MDM 20: CPT | Performed by: INTERNAL MEDICINE

## 2023-12-23 NOTE — LETTER
Patient: Lucas Valderrama  : 1950    Encounter Date: 2023    PLACE OF SERVICE:  [facility].    This is a subsequent visit.    Subjective  Patient ID: Lucas Valderrama is a 73 y.o. male who presents for Follow-up.    Mr. Lucas Valderrama is a 73-year-old male with history of COPD and congestive heart failure.  He is a diabetic.  He is unable to care for himself and requires supportive care.    Review of Systems   Constitutional:  Negative for chills and fever.   Cardiovascular:  Negative for chest pain.   All other systems reviewed and are negative.    Objective  /82   Pulse 76   Temp 36.7 °C (98.1 °F)   Resp 18     Physical Exam  Vitals reviewed.   Constitutional:       Comments: This is a well-developed and well-nourished male, lying in bed, appearing weak.   HENT:      Right Ear: Tympanic membrane, ear canal and external ear normal.      Left Ear: Tympanic membrane, ear canal and external ear normal.   Eyes:      General: No scleral icterus.     Pupils: Pupils are equal, round, and reactive to light.   Neck:      Vascular: No carotid bruit.   Cardiovascular:      Heart sounds: Normal heart sounds, S1 normal and S2 normal. No murmur heard.     No friction rub.   Pulmonary:      Breath sounds: Decreased breath sounds (throughout) present.   Abdominal:      Palpations: There is no hepatomegaly, splenomegaly or mass.   Musculoskeletal:         General: No swelling or deformity. Normal range of motion.      Cervical back: Neck supple.      Right lower leg: Edema present.      Left lower leg: Edema present.   Lymphadenopathy:      Cervical: No cervical adenopathy.      Upper Body:      Right upper body: No axillary adenopathy.      Left upper body: No axillary adenopathy.      Lower Body: No right inguinal adenopathy. No left inguinal adenopathy.   Neurological:      Mental Status: He is oriented to person, place, and time. He is lethargic.      Cranial Nerves: Cranial nerves 2-12 are intact.  No cranial nerve deficit.      Sensory: No sensory deficit.      Motor: Motor function is intact. No weakness.      Gait: Gait is intact.      Deep Tendon Reflexes: Reflexes normal.   Psychiatric:         Mood and Affect: Mood normal. Mood is not anxious or depressed. Affect is not angry.         Behavior: Behavior is not agitated.         Thought Content: Thought content normal.         Judgment: Judgment normal.     LAB WORK:  Laboratory studies reviewed.    Assessment/Plan  Problem List Items Addressed This Visit             ICD-10-CM       Advance Directives and General Issues    At risk for falls Z91.81       Cardiac and Vasculature    Atrial fibrillation (CMS/HCC) I48.91    Benign hypertension I10    Congestive heart failure (CMS/HCC) - Primary I50.9       Gastrointestinal and Abdominal    Gastroesophageal reflux disease without esophagitis K21.9       Mental Health    Depression F32.A     Other Visit Diagnoses         Codes    COPD on long-term inhaled steroid therapy (CMS/HCC)     J44.9, Z79.51    Type 2 diabetes mellitus without complication, with long-term current use of insulin (CMS/HCC)     E11.9, Z79.4    Weakness     R53.1        1. Congestive heart failure, on diuretic.  2. COPD, on bronchodilator therapy.  3. Diabetes, on insulin.  4. Atrial fibrillation, rate controlled.  5. Hypertension, med controlled.  6. Depression, on medication.  7. Reflux disease, on PPI.  8. Weakness, on PT and OT.  9. Fall risk, fall precaution.    Scribe Attestation  By signing my name below, IMarion, Keshav attest that this documentation has been prepared under the direction and in the presence of Santiago Hernandez MD.       Electronically Signed By: Santiago Hernandez MD   1/9/24  4:46 PM

## 2024-01-04 VITALS
HEART RATE: 76 BPM | RESPIRATION RATE: 18 BRPM | SYSTOLIC BLOOD PRESSURE: 126 MMHG | DIASTOLIC BLOOD PRESSURE: 82 MMHG | TEMPERATURE: 98.1 F

## 2024-01-04 ASSESSMENT — ENCOUNTER SYMPTOMS
FEVER: 0
CHILLS: 0

## 2024-01-04 NOTE — PROGRESS NOTES
PLACE OF SERVICE:  [facility].    This is a subsequent visit.    Subjective   Patient ID: Lucas Valderrama is a 73 y.o. male who presents for Follow-up.    Mr. Lucas Valderrama is a 73-year-old male with history of COPD and congestive heart failure.  He is a diabetic.  He is unable to care for himself and requires supportive care.    Review of Systems   Constitutional:  Negative for chills and fever.   Cardiovascular:  Negative for chest pain.   All other systems reviewed and are negative.    Objective   /82   Pulse 76   Temp 36.7 °C (98.1 °F)   Resp 18     Physical Exam  Vitals reviewed.   Constitutional:       Comments: This is a well-developed and well-nourished male, lying in bed, appearing weak.   HENT:      Right Ear: Tympanic membrane, ear canal and external ear normal.      Left Ear: Tympanic membrane, ear canal and external ear normal.   Eyes:      General: No scleral icterus.     Pupils: Pupils are equal, round, and reactive to light.   Neck:      Vascular: No carotid bruit.   Cardiovascular:      Heart sounds: Normal heart sounds, S1 normal and S2 normal. No murmur heard.     No friction rub.   Pulmonary:      Breath sounds: Decreased breath sounds (throughout) present.   Abdominal:      Palpations: There is no hepatomegaly, splenomegaly or mass.   Musculoskeletal:         General: No swelling or deformity. Normal range of motion.      Cervical back: Neck supple.      Right lower leg: Edema present.      Left lower leg: Edema present.   Lymphadenopathy:      Cervical: No cervical adenopathy.      Upper Body:      Right upper body: No axillary adenopathy.      Left upper body: No axillary adenopathy.      Lower Body: No right inguinal adenopathy. No left inguinal adenopathy.   Neurological:      Mental Status: He is oriented to person, place, and time. He is lethargic.      Cranial Nerves: Cranial nerves 2-12 are intact. No cranial nerve deficit.      Sensory: No sensory deficit.      Motor:  Motor function is intact. No weakness.      Gait: Gait is intact.      Deep Tendon Reflexes: Reflexes normal.   Psychiatric:         Mood and Affect: Mood normal. Mood is not anxious or depressed. Affect is not angry.         Behavior: Behavior is not agitated.         Thought Content: Thought content normal.         Judgment: Judgment normal.     LAB WORK:  Laboratory studies reviewed.    Assessment/Plan   Problem List Items Addressed This Visit             ICD-10-CM       Advance Directives and General Issues    At risk for falls Z91.81       Cardiac and Vasculature    Atrial fibrillation (CMS/HCC) I48.91    Benign hypertension I10    Congestive heart failure (CMS/HCC) - Primary I50.9       Gastrointestinal and Abdominal    Gastroesophageal reflux disease without esophagitis K21.9       Mental Health    Depression F32.A     Other Visit Diagnoses         Codes    COPD on long-term inhaled steroid therapy (CMS/HCC)     J44.9, Z79.51    Type 2 diabetes mellitus without complication, with long-term current use of insulin (CMS/HCC)     E11.9, Z79.4    Weakness     R53.1        1. Congestive heart failure, on diuretic.  2. COPD, on bronchodilator therapy.  3. Diabetes, on insulin.  4. Atrial fibrillation, rate controlled.  5. Hypertension, med controlled.  6. Depression, on medication.  7. Reflux disease, on PPI.  8. Weakness, on PT and OT.  9. Fall risk, fall precaution.    Scribe Attestation  By signing my name below, Marion MARSH Scribe attest that this documentation has been prepared under the direction and in the presence of Santiago Hernandez MD.

## 2024-01-06 ENCOUNTER — NURSING HOME VISIT (OUTPATIENT)
Dept: POST ACUTE CARE | Facility: EXTERNAL LOCATION | Age: 74
End: 2024-01-06
Payer: MEDICARE

## 2024-01-06 DIAGNOSIS — E11.9 TYPE 2 DIABETES MELLITUS WITHOUT COMPLICATION, WITH LONG-TERM CURRENT USE OF INSULIN (MULTI): ICD-10-CM

## 2024-01-06 DIAGNOSIS — K21.9 GASTROESOPHAGEAL REFLUX DISEASE WITHOUT ESOPHAGITIS: ICD-10-CM

## 2024-01-06 DIAGNOSIS — J44.9 COPD ON LONG-TERM INHALED STEROID THERAPY (MULTI): Primary | ICD-10-CM

## 2024-01-06 DIAGNOSIS — R53.1 WEAKNESS: ICD-10-CM

## 2024-01-06 DIAGNOSIS — I10 BENIGN HYPERTENSION: ICD-10-CM

## 2024-01-06 DIAGNOSIS — F32.A DEPRESSION, UNSPECIFIED DEPRESSION TYPE: ICD-10-CM

## 2024-01-06 DIAGNOSIS — I50.9 CONGESTIVE HEART FAILURE, UNSPECIFIED HF CHRONICITY, UNSPECIFIED HEART FAILURE TYPE (MULTI): ICD-10-CM

## 2024-01-06 DIAGNOSIS — Z79.51 COPD ON LONG-TERM INHALED STEROID THERAPY (MULTI): Primary | ICD-10-CM

## 2024-01-06 DIAGNOSIS — Z79.4 TYPE 2 DIABETES MELLITUS WITHOUT COMPLICATION, WITH LONG-TERM CURRENT USE OF INSULIN (MULTI): ICD-10-CM

## 2024-01-06 DIAGNOSIS — I48.91 ATRIAL FIBRILLATION, UNSPECIFIED TYPE (MULTI): ICD-10-CM

## 2024-01-06 DIAGNOSIS — Z91.81 AT RISK FOR FALLS: ICD-10-CM

## 2024-01-06 PROCEDURE — 99308 SBSQ NF CARE LOW MDM 20: CPT | Performed by: INTERNAL MEDICINE

## 2024-01-06 NOTE — LETTER
Patient: Lucas Valderrama  : 1950    Encounter Date: 2024    PLACE OF SERVICE:  [facility]    This is a subsequent visit.    Subjective  Patient ID: Lucas Valderrama is a 73 y.o. male who presents for Follow-up.    Mr. Lucas Valderrama is a 73-year-old male with history of COPD and CHF.  He is a diabetic.  He is unable to care for himself and requires supportive care.    Review of Systems   Constitutional:  Negative for chills and fever.   Cardiovascular:  Negative for chest pain.   All other systems reviewed and are negative.    Objective  /78   Pulse 76   Temp 36.7 °C (98.1 °F)   Resp 18     Physical Exam  Vitals reviewed.   Constitutional:       Comments: This is a well-developed, well-nourished male, lying in bed, appearing weak and lethargic.   HENT:      Right Ear: Tympanic membrane, ear canal and external ear normal.      Left Ear: Tympanic membrane, ear canal and external ear normal.   Eyes:      General: No scleral icterus.     Pupils: Pupils are equal, round, and reactive to light.   Neck:      Vascular: No carotid bruit.   Cardiovascular:      Heart sounds: Normal heart sounds, S1 normal and S2 normal. No murmur heard.     No friction rub.   Pulmonary:      Effort: Pulmonary effort is normal.      Breath sounds: Decreased breath sounds (throughout.) present.   Abdominal:      Palpations: There is no hepatomegaly, splenomegaly or mass.   Musculoskeletal:         General: No swelling or deformity. Normal range of motion.      Cervical back: Neck supple.      Right lower leg: Edema present.      Left lower leg: Edema present.   Lymphadenopathy:      Cervical: No cervical adenopathy.      Upper Body:      Right upper body: No axillary adenopathy.      Left upper body: No axillary adenopathy.      Lower Body: No right inguinal adenopathy. No left inguinal adenopathy.   Neurological:      Mental Status: He is oriented to person, place, and time.      Cranial Nerves: Cranial nerves 2-12  are intact. No cranial nerve deficit.      Sensory: No sensory deficit.      Motor: Motor function is intact. No weakness.      Gait: Gait is intact.      Deep Tendon Reflexes: Reflexes normal.   Psychiatric:         Mood and Affect: Mood normal. Mood is not anxious or depressed. Affect is not angry.         Behavior: Behavior is not agitated.         Thought Content: Thought content normal.         Judgment: Judgment normal.     LAB WORK:  Laboratory studies were reviewed.    Assessment/Plan  Problem List Items Addressed This Visit             ICD-10-CM       Advance Directives and General Issues    At risk for falls Z91.81       Cardiac and Vasculature    Atrial fibrillation (CMS/HCC) I48.91    Benign hypertension I10    Congestive heart failure (CMS/HCC) I50.9       Gastrointestinal and Abdominal    Gastroesophageal reflux disease without esophagitis K21.9       Mental Health    Depression F32.A     Other Visit Diagnoses         Codes    COPD on long-term inhaled steroid therapy (CMS/HCC)    -  Primary J44.9, Z79.51    Type 2 diabetes mellitus without complication, with long-term current use of insulin (CMS/HCC)     E11.9, Z79.4    Weakness     R53.1        1. COPD, on bronchodilator therapy.  2. Congestive heart failure, on diuretics.  3. Diabetes, on insulin.  4. Atrial fibrillation.  Rate controlled.  5. Depression, on medication.  6. Hypertension, medically controlled.  7. Weakness, on PT/OT.  8. Fall risk, on fall precautions.  9. Reflux disease, on PPI.    Scribe Attestation  By signing my name below, IMarion Scribe attest that this documentation has been prepared under the direction and in the presence of Santiago Hernandez MD.   All medical record entries made by the scribe were personally dictated by me I have reviewed the chart and agree the record accurately reflects my personal performance of his history physical examination and management      Electronically Signed By: Santiago Hernandez MD    1/12/24  1:09 PM

## 2024-01-08 VITALS
SYSTOLIC BLOOD PRESSURE: 124 MMHG | HEART RATE: 78 BPM | TEMPERATURE: 97.8 F | RESPIRATION RATE: 18 BRPM | DIASTOLIC BLOOD PRESSURE: 80 MMHG

## 2024-01-08 ASSESSMENT — ENCOUNTER SYMPTOMS
CHILLS: 0
FEVER: 0

## 2024-01-08 NOTE — PROGRESS NOTES
PLACE OF SERVICE:  [facility].    This is a subsequent visit.    Subjective   Patient ID: Lucas Valderrama is a 73 y.o. male who presents for Follow-up.    Mr. Lucas Valderrama is a 73-year-old male with history of diabetes.  He suffers from CHF as well as COPD.  He is unable to care for himself and requires supportive care.    ALLERGIES:  Zetia.    Review of Systems   Constitutional:  Negative for chills and fever.   Cardiovascular:  Negative for chest pain.   All other systems reviewed and are negative.    Objective   /80   Pulse 78   Temp 36.6 °C (97.8 °F)   Resp 18     Physical Exam  Vitals reviewed.   Constitutional:       General: He is not in acute distress.     Comments: This is a well-developed, well-nourished male, sitting in a chair.   HENT:      Right Ear: Tympanic membrane, ear canal and external ear normal.      Left Ear: Tympanic membrane, ear canal and external ear normal.   Eyes:      General: No scleral icterus.     Pupils: Pupils are equal, round, and reactive to light.   Neck:      Vascular: No carotid bruit.   Cardiovascular:      Heart sounds: Normal heart sounds, S1 normal and S2 normal. No murmur heard.     No friction rub.   Pulmonary:      Effort: Pulmonary effort is normal.      Breath sounds: Decreased breath sounds (throughout) present.   Abdominal:      Palpations: There is no hepatomegaly, splenomegaly or mass.   Musculoskeletal:         General: No swelling or deformity. Normal range of motion.      Cervical back: Neck supple.      Right lower leg: Edema present.      Left lower leg: Edema present.   Lymphadenopathy:      Cervical: No cervical adenopathy.      Upper Body:      Right upper body: No axillary adenopathy.      Left upper body: No axillary adenopathy.      Lower Body: No right inguinal adenopathy. No left inguinal adenopathy.   Neurological:      Mental Status: He is oriented to person, place, and time.      Cranial Nerves: Cranial nerves 2-12 are intact. No  cranial nerve deficit.      Sensory: No sensory deficit.      Motor: Motor function is intact. No weakness.      Gait: Gait is intact.      Deep Tendon Reflexes: Reflexes normal.   Psychiatric:         Mood and Affect: Mood normal. Mood is not anxious or depressed. Affect is not angry.         Behavior: Behavior is not agitated.         Thought Content: Thought content normal.         Judgment: Judgment normal.     LAB WORK:  Laboratory studies were reviewed.    Assessment/Plan   Problem List Items Addressed This Visit             ICD-10-CM       Advance Directives and General Issues    At risk for falls Z91.81       Cardiac and Vasculature    Atrial fibrillation (CMS/HCC) I48.91    Benign hypertension I10    Congestive heart failure (CMS/HCC) - Primary I50.9       Endocrine/Metabolic    Diabetes mellitus without complication (CMS/HCC) E11.9       Gastrointestinal and Abdominal    Gastroesophageal reflux disease without esophagitis K21.9       Mental Health    Depression F32.A     Other Visit Diagnoses         Codes    Chronic obstructive pulmonary disease, unspecified COPD type (CMS/HCC)     J44.9    Weakness     R53.1    Cognitive decline     R41.89        1. Congestive heart failure, on diuretic.  2. COPD, on bronchodilator therapy.  3. Diabetes, on insulin.  4. Atrial fibrillation.  Rate controlled.  5. Depression, on medication.  6. Hypertension, medically controlled.  7. Reflux disease, on PPI.  8. Weakness, on PT/OT.  9. Fall risk, on fall precautions.  10. Cognitive decline, on supportive care.    Scribe Attestation  By signing my name below, Lauren MARSH, Keshav attest that this documentation has been prepared under the direction and in the presence of Santiago Hernandez MD.

## 2024-01-11 VITALS
SYSTOLIC BLOOD PRESSURE: 132 MMHG | RESPIRATION RATE: 18 BRPM | DIASTOLIC BLOOD PRESSURE: 78 MMHG | HEART RATE: 76 BPM | TEMPERATURE: 98.1 F

## 2024-01-11 ASSESSMENT — ENCOUNTER SYMPTOMS
FEVER: 0
CHILLS: 0

## 2024-01-12 NOTE — PROGRESS NOTES
PLACE OF SERVICE:  [facility]    This is a subsequent visit.    Subjective   Patient ID: Lucas Valderrama is a 73 y.o. male who presents for Follow-up.    Mr. Lucas Valderrama is a 73-year-old male with history of COPD and CHF.  He is a diabetic.  He is unable to care for himself and requires supportive care.    Review of Systems   Constitutional:  Negative for chills and fever.   Cardiovascular:  Negative for chest pain.   All other systems reviewed and are negative.    Objective   /78   Pulse 76   Temp 36.7 °C (98.1 °F)   Resp 18     Physical Exam  Vitals reviewed.   Constitutional:       Comments: This is a well-developed, well-nourished male, lying in bed, appearing weak and lethargic.   HENT:      Right Ear: Tympanic membrane, ear canal and external ear normal.      Left Ear: Tympanic membrane, ear canal and external ear normal.   Eyes:      General: No scleral icterus.     Pupils: Pupils are equal, round, and reactive to light.   Neck:      Vascular: No carotid bruit.   Cardiovascular:      Heart sounds: Normal heart sounds, S1 normal and S2 normal. No murmur heard.     No friction rub.   Pulmonary:      Effort: Pulmonary effort is normal.      Breath sounds: Decreased breath sounds (throughout.) present.   Abdominal:      Palpations: There is no hepatomegaly, splenomegaly or mass.   Musculoskeletal:         General: No swelling or deformity. Normal range of motion.      Cervical back: Neck supple.      Right lower leg: Edema present.      Left lower leg: Edema present.   Lymphadenopathy:      Cervical: No cervical adenopathy.      Upper Body:      Right upper body: No axillary adenopathy.      Left upper body: No axillary adenopathy.      Lower Body: No right inguinal adenopathy. No left inguinal adenopathy.   Neurological:      Mental Status: He is oriented to person, place, and time.      Cranial Nerves: Cranial nerves 2-12 are intact. No cranial nerve deficit.      Sensory: No sensory  deficit.      Motor: Motor function is intact. No weakness.      Gait: Gait is intact.      Deep Tendon Reflexes: Reflexes normal.   Psychiatric:         Mood and Affect: Mood normal. Mood is not anxious or depressed. Affect is not angry.         Behavior: Behavior is not agitated.         Thought Content: Thought content normal.         Judgment: Judgment normal.     LAB WORK:  Laboratory studies were reviewed.    Assessment/Plan   Problem List Items Addressed This Visit             ICD-10-CM       Advance Directives and General Issues    At risk for falls Z91.81       Cardiac and Vasculature    Atrial fibrillation (CMS/HCC) I48.91    Benign hypertension I10    Congestive heart failure (CMS/HCC) I50.9       Gastrointestinal and Abdominal    Gastroesophageal reflux disease without esophagitis K21.9       Mental Health    Depression F32.A     Other Visit Diagnoses         Codes    COPD on long-term inhaled steroid therapy (CMS/HCC)    -  Primary J44.9, Z79.51    Type 2 diabetes mellitus without complication, with long-term current use of insulin (CMS/HCC)     E11.9, Z79.4    Weakness     R53.1        1. COPD, on bronchodilator therapy.  2. Congestive heart failure, on diuretics.  3. Diabetes, on insulin.  4. Atrial fibrillation.  Rate controlled.  5. Depression, on medication.  6. Hypertension, medically controlled.  7. Weakness, on PT/OT.  8. Fall risk, on fall precautions.  9. Reflux disease, on PPI.    Scribe Attestation  By signing my name below, IMarion Scribe attest that this documentation has been prepared under the direction and in the presence of Santiago Hernandez MD.   All medical record entries made by the scribe were personally dictated by me I have reviewed the chart and agree the record accurately reflects my personal performance of his history physical examination and management

## 2024-01-14 ENCOUNTER — NURSING HOME VISIT (OUTPATIENT)
Dept: POST ACUTE CARE | Facility: EXTERNAL LOCATION | Age: 74
End: 2024-01-14
Payer: MEDICARE

## 2024-01-14 DIAGNOSIS — I50.9 CONGESTIVE HEART FAILURE, UNSPECIFIED HF CHRONICITY, UNSPECIFIED HEART FAILURE TYPE (MULTI): Primary | ICD-10-CM

## 2024-01-14 DIAGNOSIS — Z91.81 AT RISK FOR FALLS: ICD-10-CM

## 2024-01-14 DIAGNOSIS — E11.9 TYPE 2 DIABETES MELLITUS WITHOUT COMPLICATION, WITH LONG-TERM CURRENT USE OF INSULIN (MULTI): ICD-10-CM

## 2024-01-14 DIAGNOSIS — J44.9 COPD ON LONG-TERM INHALED STEROID THERAPY (MULTI): ICD-10-CM

## 2024-01-14 DIAGNOSIS — I10 BENIGN HYPERTENSION: ICD-10-CM

## 2024-01-14 DIAGNOSIS — R53.1 WEAKNESS: ICD-10-CM

## 2024-01-14 DIAGNOSIS — F32.A DEPRESSION, UNSPECIFIED DEPRESSION TYPE: ICD-10-CM

## 2024-01-14 DIAGNOSIS — Z79.51 COPD ON LONG-TERM INHALED STEROID THERAPY (MULTI): ICD-10-CM

## 2024-01-14 DIAGNOSIS — G62.9 NEUROPATHY: ICD-10-CM

## 2024-01-14 DIAGNOSIS — R41.89 COGNITIVE DECLINE: ICD-10-CM

## 2024-01-14 DIAGNOSIS — I48.91 ATRIAL FIBRILLATION, UNSPECIFIED TYPE (MULTI): ICD-10-CM

## 2024-01-14 DIAGNOSIS — Z79.4 TYPE 2 DIABETES MELLITUS WITHOUT COMPLICATION, WITH LONG-TERM CURRENT USE OF INSULIN (MULTI): ICD-10-CM

## 2024-01-14 DIAGNOSIS — K21.9 GASTROESOPHAGEAL REFLUX DISEASE WITHOUT ESOPHAGITIS: ICD-10-CM

## 2024-01-14 PROCEDURE — 99309 SBSQ NF CARE MODERATE MDM 30: CPT | Performed by: INTERNAL MEDICINE

## 2024-01-14 NOTE — LETTER
Patient: Lucas Valderrama  : 1950    Encounter Date: 2024    PLACE OF SERVICE:  [facility]    This is a subsequent visit.    Subjective  Patient ID: Lucas Valderrama is a 73 y.o. male who presents for Follow-up.    Mr. Lucas Valderrama is a 73-year-old male with history of CHF and COPD.  He is a diabetic and unable to care for himself.  He requires supportive care.    Review of Systems   Constitutional:  Negative for chills and fever.   Cardiovascular:  Negative for chest pain.   All other systems reviewed and are negative.    Objective  /82   Pulse 78   Temp 36.7 °C (98.1 °F)   Resp 18     Physical Exam  Vitals reviewed.   Constitutional:       Comments: This is a well-developed, well-nourished male, lying in bed, appearing weak   HENT:      Right Ear: Tympanic membrane, ear canal and external ear normal.      Left Ear: Tympanic membrane, ear canal and external ear normal.   Eyes:      General: No scleral icterus.     Pupils: Pupils are equal, round, and reactive to light.   Neck:      Vascular: No carotid bruit.   Cardiovascular:      Heart sounds: Normal heart sounds, S1 normal and S2 normal. No murmur heard.     No friction rub.   Pulmonary:      Breath sounds: Decreased breath sounds (throughout) present.   Abdominal:      Palpations: There is no hepatomegaly, splenomegaly or mass.   Musculoskeletal:         General: No swelling or deformity. Normal range of motion.      Cervical back: Neck supple.      Right lower leg: Edema present.      Left lower leg: Edema present.   Lymphadenopathy:      Cervical: No cervical adenopathy.      Upper Body:      Right upper body: No axillary adenopathy.      Left upper body: No axillary adenopathy.      Lower Body: No right inguinal adenopathy. No left inguinal adenopathy.   Neurological:      Mental Status: He is oriented to person, place, and time. He is lethargic.      Cranial Nerves: Cranial nerves 2-12 are intact. No cranial nerve deficit.       Sensory: No sensory deficit.      Motor: Motor function is intact. No weakness.      Gait: Gait is intact.      Deep Tendon Reflexes: Reflexes normal.   Psychiatric:         Mood and Affect: Mood normal. Mood is not anxious or depressed. Affect is not angry.         Behavior: Behavior is not agitated.         Thought Content: Thought content normal.         Judgment: Judgment normal.     LAB WORK:  Laboratory studies were reviewed.    Assessment/Plan  Problem List Items Addressed This Visit             ICD-10-CM       Advance Directives and General Issues    At risk for falls Z91.81       Cardiac and Vasculature    Atrial fibrillation (CMS/HCC) I48.91    Benign hypertension I10    Congestive heart failure (CMS/HCC) - Primary I50.9       Gastrointestinal and Abdominal    Gastroesophageal reflux disease without esophagitis K21.9       Mental Health    Depression F32.A       Neuro    Neuropathy G62.9     Other Visit Diagnoses         Codes    COPD on long-term inhaled steroid therapy (CMS/HCC)     J44.9, Z79.51    Type 2 diabetes mellitus without complication, with long-term current use of insulin (CMS/HCC)     E11.9, Z79.4    Weakness     R53.1    Cognitive decline     R41.89        1. Congestive heart failure, on diuretic.  2. COPD, on bronchodilator therapy.  3. Diabetes, on insulin.  4. Atrial fibrillation, rate controlled.  5. Neuropathy, on gabapentin.  6. Depression, on medication.  7. Weakness, on PT/OT.  8. Fall risk, on fall precaution.  9. Cognitive decline, on supportive care.  10. Reflux disease, on PPI.  11. Hypertension, med controlled.    Scribe Attestation  By signing my name below, IMarion Scribe attest that this documentation has been prepared under the direction and in the presence of Santiago Hernandez MD.   All medical record entries made by the scribe were personally dictated by me I have reviewed the chart and agree the record accurately reflects my personal performance of his history  physical examination and management      Electronically Signed By: Santiago Hernandez MD   1/18/24 12:28 PM

## 2024-01-17 VITALS
DIASTOLIC BLOOD PRESSURE: 82 MMHG | TEMPERATURE: 98.1 F | RESPIRATION RATE: 18 BRPM | HEART RATE: 78 BPM | SYSTOLIC BLOOD PRESSURE: 128 MMHG

## 2024-01-17 ASSESSMENT — ENCOUNTER SYMPTOMS
CHILLS: 0
FEVER: 0

## 2024-01-17 NOTE — PROGRESS NOTES
PLACE OF SERVICE:  [facility]    This is a subsequent visit.    Subjective   Patient ID: Lucas Valderrama is a 73 y.o. male who presents for Follow-up.    Mr. Lucas Valderrama is a 73-year-old male with history of CHF and COPD.  He is a diabetic and unable to care for himself.  He requires supportive care.    Review of Systems   Constitutional:  Negative for chills and fever.   Cardiovascular:  Negative for chest pain.   All other systems reviewed and are negative.    Objective   /82   Pulse 78   Temp 36.7 °C (98.1 °F)   Resp 18     Physical Exam  Vitals reviewed.   Constitutional:       Comments: This is a well-developed, well-nourished male, lying in bed, appearing weak   HENT:      Right Ear: Tympanic membrane, ear canal and external ear normal.      Left Ear: Tympanic membrane, ear canal and external ear normal.   Eyes:      General: No scleral icterus.     Pupils: Pupils are equal, round, and reactive to light.   Neck:      Vascular: No carotid bruit.   Cardiovascular:      Heart sounds: Normal heart sounds, S1 normal and S2 normal. No murmur heard.     No friction rub.   Pulmonary:      Breath sounds: Decreased breath sounds (throughout) present.   Abdominal:      Palpations: There is no hepatomegaly, splenomegaly or mass.   Musculoskeletal:         General: No swelling or deformity. Normal range of motion.      Cervical back: Neck supple.      Right lower leg: Edema present.      Left lower leg: Edema present.   Lymphadenopathy:      Cervical: No cervical adenopathy.      Upper Body:      Right upper body: No axillary adenopathy.      Left upper body: No axillary adenopathy.      Lower Body: No right inguinal adenopathy. No left inguinal adenopathy.   Neurological:      Mental Status: He is oriented to person, place, and time. He is lethargic.      Cranial Nerves: Cranial nerves 2-12 are intact. No cranial nerve deficit.      Sensory: No sensory deficit.      Motor: Motor function is intact. No  weakness.      Gait: Gait is intact.      Deep Tendon Reflexes: Reflexes normal.   Psychiatric:         Mood and Affect: Mood normal. Mood is not anxious or depressed. Affect is not angry.         Behavior: Behavior is not agitated.         Thought Content: Thought content normal.         Judgment: Judgment normal.     LAB WORK:  Laboratory studies were reviewed.    Assessment/Plan   Problem List Items Addressed This Visit             ICD-10-CM       Advance Directives and General Issues    At risk for falls Z91.81       Cardiac and Vasculature    Atrial fibrillation (CMS/HCC) I48.91    Benign hypertension I10    Congestive heart failure (CMS/HCC) - Primary I50.9       Gastrointestinal and Abdominal    Gastroesophageal reflux disease without esophagitis K21.9       Mental Health    Depression F32.A       Neuro    Neuropathy G62.9     Other Visit Diagnoses         Codes    COPD on long-term inhaled steroid therapy (CMS/HCC)     J44.9, Z79.51    Type 2 diabetes mellitus without complication, with long-term current use of insulin (CMS/HCC)     E11.9, Z79.4    Weakness     R53.1    Cognitive decline     R41.89        1. Congestive heart failure, on diuretic.  2. COPD, on bronchodilator therapy.  3. Diabetes, on insulin.  4. Atrial fibrillation, rate controlled.  5. Neuropathy, on gabapentin.  6. Depression, on medication.  7. Weakness, on PT/OT.  8. Fall risk, on fall precaution.  9. Cognitive decline, on supportive care.  10. Reflux disease, on PPI.  11. Hypertension, med controlled.    Scribe Attestation  By signing my name below, IMarion Scribe attest that this documentation has been prepared under the direction and in the presence of Santiago Hernandez MD.   All medical record entries made by the scribe were personally dictated by me I have reviewed the chart and agree the record accurately reflects my personal performance of his history physical examination and management

## 2024-02-02 ENCOUNTER — NURSING HOME VISIT (OUTPATIENT)
Dept: POST ACUTE CARE | Facility: EXTERNAL LOCATION | Age: 74
End: 2024-02-02
Payer: MEDICARE

## 2024-02-02 DIAGNOSIS — I48.91 ATRIAL FIBRILLATION, UNSPECIFIED TYPE (MULTI): ICD-10-CM

## 2024-02-02 DIAGNOSIS — K21.9 GASTROESOPHAGEAL REFLUX DISEASE WITHOUT ESOPHAGITIS: ICD-10-CM

## 2024-02-02 DIAGNOSIS — J44.9 COPD ON LONG-TERM INHALED STEROID THERAPY (MULTI): ICD-10-CM

## 2024-02-02 DIAGNOSIS — F32.A DEPRESSION, UNSPECIFIED DEPRESSION TYPE: ICD-10-CM

## 2024-02-02 DIAGNOSIS — R41.89 COGNITIVE DECLINE: ICD-10-CM

## 2024-02-02 DIAGNOSIS — I10 BENIGN HYPERTENSION: ICD-10-CM

## 2024-02-02 DIAGNOSIS — Z91.81 AT RISK FOR FALLS: ICD-10-CM

## 2024-02-02 DIAGNOSIS — Z79.51 COPD ON LONG-TERM INHALED STEROID THERAPY (MULTI): ICD-10-CM

## 2024-02-02 DIAGNOSIS — E11.9 TYPE 2 DIABETES MELLITUS WITHOUT COMPLICATION, WITH LONG-TERM CURRENT USE OF INSULIN (MULTI): ICD-10-CM

## 2024-02-02 DIAGNOSIS — I50.9 CONGESTIVE HEART FAILURE, UNSPECIFIED HF CHRONICITY, UNSPECIFIED HEART FAILURE TYPE (MULTI): Primary | ICD-10-CM

## 2024-02-02 DIAGNOSIS — R53.1 WEAKNESS: ICD-10-CM

## 2024-02-02 DIAGNOSIS — Z79.4 TYPE 2 DIABETES MELLITUS WITHOUT COMPLICATION, WITH LONG-TERM CURRENT USE OF INSULIN (MULTI): ICD-10-CM

## 2024-02-02 PROCEDURE — 99308 SBSQ NF CARE LOW MDM 20: CPT | Performed by: INTERNAL MEDICINE

## 2024-02-15 VITALS
TEMPERATURE: 97.8 F | DIASTOLIC BLOOD PRESSURE: 78 MMHG | SYSTOLIC BLOOD PRESSURE: 130 MMHG | HEART RATE: 74 BPM | RESPIRATION RATE: 18 BRPM

## 2024-02-15 ASSESSMENT — ENCOUNTER SYMPTOMS
FEVER: 0
CHILLS: 0

## 2024-02-15 NOTE — PROGRESS NOTES
PLACE OF SERVICE:  [facility].    This is a subsequent visit.    Subjective   Patient ID: Lucas Valderrama is a 73 y.o. male who presents for Follow-up.    Mr. Lucas Valderrama is a 73-year-old male with history of heart failure with COPD.  He has a history of cognitive decline and depression.  He is unable to care for himself and requires supportive care.    Review of Systems   Constitutional:  Negative for chills and fever.   Cardiovascular:  Negative for chest pain.   All other systems reviewed and are negative.    Objective   /78   Pulse 74   Temp 36.6 °C (97.8 °F)   Resp 18     Physical Exam  Vitals reviewed.   Constitutional:       Comments: This is a well-developed, well-nourished male, lying in bed, appearing weak   HENT:      Right Ear: Tympanic membrane, ear canal and external ear normal.      Left Ear: Tympanic membrane, ear canal and external ear normal.   Eyes:      General: No scleral icterus.     Pupils: Pupils are equal, round, and reactive to light.   Neck:      Vascular: No carotid bruit.   Cardiovascular:      Heart sounds: Normal heart sounds, S1 normal and S2 normal. No murmur heard.     No friction rub.   Pulmonary:      Breath sounds: Decreased breath sounds (throughout) present.   Abdominal:      Palpations: There is no hepatomegaly, splenomegaly or mass.   Musculoskeletal:         General: No swelling or deformity. Normal range of motion.      Cervical back: Neck supple.      Right lower leg: Edema present.      Left lower leg: Edema present.   Lymphadenopathy:      Cervical: No cervical adenopathy.      Upper Body:      Right upper body: No axillary adenopathy.      Left upper body: No axillary adenopathy.      Lower Body: No right inguinal adenopathy. No left inguinal adenopathy.   Neurological:      Mental Status: He is oriented to person, place, and time. He is lethargic.      Cranial Nerves: Cranial nerves 2-12 are intact. No cranial nerve deficit.      Sensory: No  sensory deficit.      Motor: Motor function is intact. No weakness.      Gait: Gait is intact.      Deep Tendon Reflexes: Reflexes normal.   Psychiatric:         Mood and Affect: Mood normal. Mood is not anxious or depressed. Affect is not angry.         Behavior: Behavior is not agitated.         Thought Content: Thought content normal.         Judgment: Judgment normal.     LAB WORK: Laboratory studies were reviewed.    Assessment/Plan   Problem List Items Addressed This Visit             ICD-10-CM       Advance Directives and General Issues    At risk for falls Z91.81       Cardiac and Vasculature    Atrial fibrillation (CMS/HCC) I48.91    Benign hypertension I10    Congestive heart failure (CMS/HCC) - Primary I50.9       Gastrointestinal and Abdominal    Gastroesophageal reflux disease without esophagitis K21.9       Mental Health    Depression F32.A     Other Visit Diagnoses         Codes    COPD on long-term inhaled steroid therapy (CMS/HCC)     J44.9, Z79.51    Type 2 diabetes mellitus without complication, with long-term current use of insulin (CMS/HCC)     E11.9, Z79.4    Weakness     R53.1    Cognitive decline     R41.89        1. Congestive heart failure, on diuretic.  2. COPD, on bronchodilator therapy.  3. Diabetes, on insulin.  4. Atrial fibrillation, rate controlled.  5. Weakness, on PT and OT.  6. Fall risk, on fall precaution.  7. Depression, on medication.  8. Hypertension, medically controlled.  9. Reflux disease, on PPI.  10. Cognitive decline, on supportive care.    Scribe Attestation  By signing my name below, Marion MARSH Scribe attest that this documentation has been prepared under the direction and in the presence of Santiago Hernandez MD.

## 2024-02-25 ENCOUNTER — APPOINTMENT (OUTPATIENT)
Dept: RADIOLOGY | Facility: HOSPITAL | Age: 74
End: 2024-02-25
Payer: MEDICARE

## 2024-02-25 ENCOUNTER — APPOINTMENT (OUTPATIENT)
Dept: CARDIOLOGY | Facility: HOSPITAL | Age: 74
End: 2024-02-25
Payer: MEDICARE

## 2024-02-25 ENCOUNTER — HOSPITAL ENCOUNTER (EMERGENCY)
Facility: HOSPITAL | Age: 74
Discharge: HOME | End: 2024-02-25
Attending: STUDENT IN AN ORGANIZED HEALTH CARE EDUCATION/TRAINING PROGRAM
Payer: MEDICARE

## 2024-02-25 VITALS
RESPIRATION RATE: 15 BRPM | WEIGHT: 173.5 LBS | SYSTOLIC BLOOD PRESSURE: 156 MMHG | TEMPERATURE: 98.4 F | HEART RATE: 96 BPM | HEIGHT: 60 IN | BODY MASS INDEX: 34.06 KG/M2 | DIASTOLIC BLOOD PRESSURE: 77 MMHG | OXYGEN SATURATION: 99 %

## 2024-02-25 DIAGNOSIS — R05.1 ACUTE COUGH: ICD-10-CM

## 2024-02-25 DIAGNOSIS — R10.31 RIGHT LOWER QUADRANT ABDOMINAL PAIN: Primary | ICD-10-CM

## 2024-02-25 DIAGNOSIS — R11.2 NAUSEA AND VOMITING, UNSPECIFIED VOMITING TYPE: ICD-10-CM

## 2024-02-25 LAB
ALBUMIN SERPL-MCNC: 4.6 G/DL (ref 3.5–5)
ALP BLD-CCNC: 126 U/L (ref 35–125)
ALT SERPL-CCNC: 53 U/L (ref 5–40)
ANION GAP SERPL CALC-SCNC: 15 MMOL/L
APPEARANCE UR: CLEAR
AST SERPL-CCNC: 38 U/L (ref 5–40)
BASOPHILS # BLD AUTO: 0.05 X10*3/UL (ref 0–0.1)
BASOPHILS NFR BLD AUTO: 0.5 %
BILIRUB SERPL-MCNC: 0.5 MG/DL (ref 0.1–1.2)
BILIRUB UR STRIP.AUTO-MCNC: NEGATIVE MG/DL
BUN SERPL-MCNC: 14 MG/DL (ref 8–25)
CALCIUM SERPL-MCNC: 9.9 MG/DL (ref 8.5–10.4)
CHLORIDE SERPL-SCNC: 103 MMOL/L (ref 97–107)
CO2 SERPL-SCNC: 25 MMOL/L (ref 24–31)
COLOR UR: COLORLESS
CREAT SERPL-MCNC: 1 MG/DL (ref 0.4–1.6)
EGFRCR SERPLBLD CKD-EPI 2021: 78 ML/MIN/1.73M*2
EOSINOPHIL # BLD AUTO: 0.11 X10*3/UL (ref 0–0.4)
EOSINOPHIL NFR BLD AUTO: 1 %
ERYTHROCYTE [DISTWIDTH] IN BLOOD BY AUTOMATED COUNT: 13.2 % (ref 11.5–14.5)
FLUAV RNA RESP QL NAA+PROBE: NOT DETECTED
FLUBV RNA RESP QL NAA+PROBE: NOT DETECTED
GLUCOSE SERPL-MCNC: 224 MG/DL (ref 65–99)
GLUCOSE UR STRIP.AUTO-MCNC: ABNORMAL MG/DL
HCT VFR BLD AUTO: 43 % (ref 41–52)
HGB BLD-MCNC: 14.4 G/DL (ref 13.5–17.5)
HOLD SPECIMEN: NORMAL
IMM GRANULOCYTES # BLD AUTO: 0.06 X10*3/UL (ref 0–0.5)
IMM GRANULOCYTES NFR BLD AUTO: 0.6 % (ref 0–0.9)
KETONES UR STRIP.AUTO-MCNC: NEGATIVE MG/DL
LEUKOCYTE ESTERASE UR QL STRIP.AUTO: NEGATIVE
LIPASE SERPL-CCNC: 65 U/L (ref 16–63)
LYMPHOCYTES # BLD AUTO: 3.79 X10*3/UL (ref 0.8–3)
LYMPHOCYTES NFR BLD AUTO: 35.2 %
MCH RBC QN AUTO: 31.4 PG (ref 26–34)
MCHC RBC AUTO-ENTMCNC: 33.5 G/DL (ref 32–36)
MCV RBC AUTO: 94 FL (ref 80–100)
MONOCYTES # BLD AUTO: 0.81 X10*3/UL (ref 0.05–0.8)
MONOCYTES NFR BLD AUTO: 7.5 %
NEUTROPHILS # BLD AUTO: 5.94 X10*3/UL (ref 1.6–5.5)
NEUTROPHILS NFR BLD AUTO: 55.2 %
NITRITE UR QL STRIP.AUTO: NEGATIVE
NRBC BLD-RTO: 0 /100 WBCS (ref 0–0)
PH UR STRIP.AUTO: 7 [PH]
PLATELET # BLD AUTO: 318 X10*3/UL (ref 150–450)
POTASSIUM SERPL-SCNC: 4.2 MMOL/L (ref 3.4–5.1)
PROT SERPL-MCNC: 7.6 G/DL (ref 5.9–7.9)
PROT UR STRIP.AUTO-MCNC: NEGATIVE MG/DL
RBC # BLD AUTO: 4.58 X10*6/UL (ref 4.5–5.9)
RBC # UR STRIP.AUTO: NEGATIVE /UL
SARS-COV-2 RNA RESP QL NAA+PROBE: NOT DETECTED
SODIUM SERPL-SCNC: 143 MMOL/L (ref 133–145)
SP GR UR STRIP.AUTO: 1.04
UROBILINOGEN UR STRIP.AUTO-MCNC: NORMAL MG/DL
WBC # BLD AUTO: 10.8 X10*3/UL (ref 4.4–11.3)

## 2024-02-25 PROCEDURE — 81003 URINALYSIS AUTO W/O SCOPE: CPT | Performed by: STUDENT IN AN ORGANIZED HEALTH CARE EDUCATION/TRAINING PROGRAM

## 2024-02-25 PROCEDURE — 2500000004 HC RX 250 GENERAL PHARMACY W/ HCPCS (ALT 636 FOR OP/ED): Performed by: STUDENT IN AN ORGANIZED HEALTH CARE EDUCATION/TRAINING PROGRAM

## 2024-02-25 PROCEDURE — 71045 X-RAY EXAM CHEST 1 VIEW: CPT

## 2024-02-25 PROCEDURE — 84075 ASSAY ALKALINE PHOSPHATASE: CPT | Performed by: STUDENT IN AN ORGANIZED HEALTH CARE EDUCATION/TRAINING PROGRAM

## 2024-02-25 PROCEDURE — 2550000001 HC RX 255 CONTRASTS: Performed by: STUDENT IN AN ORGANIZED HEALTH CARE EDUCATION/TRAINING PROGRAM

## 2024-02-25 PROCEDURE — 96374 THER/PROPH/DIAG INJ IV PUSH: CPT

## 2024-02-25 PROCEDURE — 85025 COMPLETE CBC W/AUTO DIFF WBC: CPT | Performed by: STUDENT IN AN ORGANIZED HEALTH CARE EDUCATION/TRAINING PROGRAM

## 2024-02-25 PROCEDURE — 71045 X-RAY EXAM CHEST 1 VIEW: CPT | Mod: FOREIGN READ | Performed by: RADIOLOGY

## 2024-02-25 PROCEDURE — 36415 COLL VENOUS BLD VENIPUNCTURE: CPT | Performed by: STUDENT IN AN ORGANIZED HEALTH CARE EDUCATION/TRAINING PROGRAM

## 2024-02-25 PROCEDURE — 93005 ELECTROCARDIOGRAM TRACING: CPT

## 2024-02-25 PROCEDURE — 74177 CT ABD & PELVIS W/CONTRAST: CPT

## 2024-02-25 PROCEDURE — 83690 ASSAY OF LIPASE: CPT | Performed by: STUDENT IN AN ORGANIZED HEALTH CARE EDUCATION/TRAINING PROGRAM

## 2024-02-25 PROCEDURE — 87636 SARSCOV2 & INF A&B AMP PRB: CPT | Performed by: STUDENT IN AN ORGANIZED HEALTH CARE EDUCATION/TRAINING PROGRAM

## 2024-02-25 PROCEDURE — 99285 EMERGENCY DEPT VISIT HI MDM: CPT | Mod: 25

## 2024-02-25 RX ORDER — ONDANSETRON HYDROCHLORIDE 2 MG/ML
4 INJECTION, SOLUTION INTRAVENOUS ONCE
Status: COMPLETED | OUTPATIENT
Start: 2024-02-25 | End: 2024-02-25

## 2024-02-25 RX ORDER — ONDANSETRON 4 MG/1
4 TABLET, ORALLY DISINTEGRATING ORAL EVERY 8 HOURS PRN
Qty: 15 TABLET | Refills: 0 | Status: SHIPPED | OUTPATIENT
Start: 2024-02-25

## 2024-02-25 RX ADMIN — SODIUM CHLORIDE 500 ML: 900 INJECTION, SOLUTION INTRAVENOUS at 06:14

## 2024-02-25 RX ADMIN — IOHEXOL 75 ML: 350 INJECTION, SOLUTION INTRAVENOUS at 07:10

## 2024-02-25 RX ADMIN — ONDANSETRON 4 MG: 2 INJECTION INTRAMUSCULAR; INTRAVENOUS at 06:14

## 2024-02-25 ASSESSMENT — PAIN DESCRIPTION - LOCATION: LOCATION: ABDOMEN

## 2024-02-25 ASSESSMENT — PAIN SCALES - GENERAL
PAINLEVEL_OUTOF10: 0 - NO PAIN
PAINLEVEL_OUTOF10: 4

## 2024-02-25 ASSESSMENT — PAIN DESCRIPTION - ONSET: ONSET: AWAKENED FROM SLEEP

## 2024-02-25 ASSESSMENT — PAIN DESCRIPTION - PAIN TYPE: TYPE: ACUTE PAIN

## 2024-02-25 ASSESSMENT — PAIN DESCRIPTION - FREQUENCY: FREQUENCY: CONSTANT/CONTINUOUS

## 2024-02-25 ASSESSMENT — COLUMBIA-SUICIDE SEVERITY RATING SCALE - C-SSRS
2. HAVE YOU ACTUALLY HAD ANY THOUGHTS OF KILLING YOURSELF?: NO
1. IN THE PAST MONTH, HAVE YOU WISHED YOU WERE DEAD OR WISHED YOU COULD GO TO SLEEP AND NOT WAKE UP?: NO
6. HAVE YOU EVER DONE ANYTHING, STARTED TO DO ANYTHING, OR PREPARED TO DO ANYTHING TO END YOUR LIFE?: NO

## 2024-02-25 ASSESSMENT — PAIN - FUNCTIONAL ASSESSMENT: PAIN_FUNCTIONAL_ASSESSMENT: 0-10

## 2024-02-25 NOTE — DISCHARGE INSTRUCTIONS
Your laboratory studies, CAT scan, and x-ray did not reveal any signs of the cause of your illness.  I suspect viral illness.  You should ensure that you are drinking enough to stay well-hydrated and have been given a prescription for nausea medication to use as needed.  You should follow-up with your primary care physician.  Return to the emergency department with any worsening symptoms.    It is important to remember that your care does not end here and you must continue to monitor your condition closely. Please return to the emergency department for any worsening or concerning signs or symptoms as directed by our conversations and the discharge instructions. If you do not have a doctor please contact the referral number on your discharge instructions. Please contact any physician specialists provided in your discharge notes as it is very important to follow up with them regarding your condition. If you are unable to reach the physicians provided, please come back to the Emergency Department at any time.    Return to emergency room without delay for ANY new or worsening pains or for any other symptoms or concerns.  Return with worsening pains, nausea, vomiting, trouble breathing, palpitations, shortness of breath, inability to pass stool or urine, loss of control of stool or urine, any numbness or tingling (that is not normal for you), uncontrolled fevers, the passing of blood or other material in stool or urine, rashes, pains or for any other symptoms or concerns you may have.  You are always welcome to return to the ER at any time for any reason or for any other concerns you may have.

## 2024-02-25 NOTE — ED PROVIDER NOTES
HPI   Chief Complaint   Patient presents with    Abdominal Pain     Patient started having N/V tonight. Has been feeling flu like symptoms since yesterday, has vomited on arrival. Abdominal pain present in all quadrants.        Patient presents with 2 days of cough and feeling unwell.  He reports that tonight, he has been vomiting and on the way to the hospital even vomited twice.  During one of his vomiting episodes, he lost control of bowels and bladder and had both diarrhea and urination on himself.  He reports that he has been having some right-sided abdominal pain as well.  No fevers.                          Edmonds Coma Scale Score: 15                     Patient History   Past Medical History:   Diagnosis Date    Anxiety     At risk for falls     Atrial fibrillation (CMS/[facility])     CHF (congestive heart failure) (CMS/[facility])     Cognitive decline     COPD (chronic obstructive pulmonary disease) (CMS/[facility])     Depression     Diabetes mellitus (CMS/H[facility])     GERD (gastroesophageal reflux disease)     Heart failure (CMS/[facility])     Hypertension     Neuropathy     Weakness     Wheelchair dependence      No past surgical history on file.  Family History   Problem Relation Name Age of Onset    Hypertension Other      Diabetes Other       Social History     Tobacco Use    Smoking status: Former     Types: Cigarettes    Smokeless tobacco: Not on file   Substance Use Topics    Alcohol use: Never    Drug use: Not on file       Physical Exam   ED Triage Vitals [02/25/24 0550]   Temperature Heart Rate Respirations BP   36.9 °C (98.4 °F) (!) 107 (!) 21 (!) 184/84      Pulse Ox Temp Source Heart Rate Source Patient Position   96 % Oral Monitor Lying      BP Location FiO2 (%)     Right arm --       Physical Exam  HENT:      Head: Normocephalic.   Eyes:      General: No scleral icterus.  Cardiovascular:      Rate and Rhythm: Normal rate.   Pulmonary:      Effort: Pulmonary effort is normal.      Breath sounds: Normal breath sounds.    Abdominal:      Comments: Right lower quadrant tenderness to palpation   Skin:     General: Skin is warm.   Neurological:      Mental Status: He is alert.      Comments: Patient awake and alert, answers questions appropriately.       EKG interpreted by me: Sinus tachycardia, rate 101.  Normal axis.  Right bundle branch block.  Baseline wander makes ST and T wave interpretation difficult.  No significant ST abnormalities.  T wave inversions thought to be normal in setting of right bundle branch block.    ED Course & MDM   Diagnoses as of 02/25/24 0616   Right lower quadrant abdominal pain   Nausea and vomiting, unspecified vomiting type   Acute cough       Medical Decision Making  Laboratory studies significant for hyperglycemia but otherwise are unremarkable.  CT and chest x-ray without acute findings.  Patient feeling better following doses of IV fluids and antiemetics.  My suspicion for bowel obstruction, ischemia, perforation, AAA, aortic dissection, cholecystitis, appendicitis as etiology of symptoms is very low.  Patient given prescription for Zofran to use at home and was advised to follow-up with primary care physician.  Return precautions given for any worsening symptoms.  Parts of this chart were completed with dictation software, please excuse any errors in transcription.        Procedure  Procedures     Joe Mancini MD  02/25/24 3898       Joe Mancini MD  02/25/24 8377

## 2024-02-26 LAB
ATRIAL RATE: 101 BPM
P AXIS: 95 DEGREES
P OFFSET: 168 MS
P ONSET: 136 MS
PR INTERVAL: 166 MS
Q ONSET: 219 MS
QRS COUNT: 16 BEATS
QRS DURATION: 124 MS
QT INTERVAL: 430 MS
QTC CALCULATION(BAZETT): 557 MS
QTC FREDERICIA: 511 MS
R AXIS: 23 DEGREES
T AXIS: -45 DEGREES
T OFFSET: 434 MS
VENTRICULAR RATE: 101 BPM

## 2024-03-02 ENCOUNTER — NURSING HOME VISIT (OUTPATIENT)
Dept: POST ACUTE CARE | Facility: EXTERNAL LOCATION | Age: 74
End: 2024-03-02
Payer: MEDICARE

## 2024-03-02 DIAGNOSIS — Z79.4 TYPE 2 DIABETES MELLITUS WITHOUT COMPLICATION, WITH LONG-TERM CURRENT USE OF INSULIN (MULTI): ICD-10-CM

## 2024-03-02 DIAGNOSIS — I10 BENIGN HYPERTENSION: ICD-10-CM

## 2024-03-02 DIAGNOSIS — I50.9 CONGESTIVE HEART FAILURE, UNSPECIFIED HF CHRONICITY, UNSPECIFIED HEART FAILURE TYPE (MULTI): Primary | ICD-10-CM

## 2024-03-02 DIAGNOSIS — F32.A DEPRESSION, UNSPECIFIED DEPRESSION TYPE: ICD-10-CM

## 2024-03-02 DIAGNOSIS — E11.9 TYPE 2 DIABETES MELLITUS WITHOUT COMPLICATION, WITH LONG-TERM CURRENT USE OF INSULIN (MULTI): ICD-10-CM

## 2024-03-02 DIAGNOSIS — J44.9 COPD ON LONG-TERM INHALED STEROID THERAPY (MULTI): ICD-10-CM

## 2024-03-02 DIAGNOSIS — Z91.81 AT RISK FOR FALLS: ICD-10-CM

## 2024-03-02 DIAGNOSIS — Z79.51 COPD ON LONG-TERM INHALED STEROID THERAPY (MULTI): ICD-10-CM

## 2024-03-02 DIAGNOSIS — K21.9 GASTROESOPHAGEAL REFLUX DISEASE WITHOUT ESOPHAGITIS: ICD-10-CM

## 2024-03-02 DIAGNOSIS — R53.1 WEAKNESS: ICD-10-CM

## 2024-03-02 DIAGNOSIS — I48.91 ATRIAL FIBRILLATION, UNSPECIFIED TYPE (MULTI): ICD-10-CM

## 2024-03-02 PROCEDURE — 99309 SBSQ NF CARE MODERATE MDM 30: CPT | Performed by: INTERNAL MEDICINE

## 2024-03-02 NOTE — LETTER
Patient: Lucas Valderrama  : 1950    Encounter Date: 2024    PLACE OF SERVICE:  [facility].    This is a subsequent visit.    Subjective  Patient ID: Lucas Valderrama is a 73 y.o. male who presents for Follow-up.    Mr. Lucas Valderrama is a 73-year-old male with history of COPD and congestive heart failure.  He has several medical issues and is unable to care for himself.  He requires supportive care.    Review of Systems   Constitutional:  Negative for chills and fever.   Cardiovascular:  Negative for chest pain.   All other systems reviewed and are negative.    Objective  /82   Pulse 78   Temp 36.6 °C (97.9 °F)   Resp 18     Physical Exam  Vitals reviewed.   Constitutional:       General: He is not in acute distress.     Comments: This is a well-developed, well-nourished male, sitting in a chair.   HENT:      Right Ear: Tympanic membrane, ear canal and external ear normal.      Left Ear: Tympanic membrane, ear canal and external ear normal.   Eyes:      General: No scleral icterus.     Pupils: Pupils are equal, round, and reactive to light.   Neck:      Vascular: No carotid bruit.   Cardiovascular:      Heart sounds: Normal heart sounds, S1 normal and S2 normal. No murmur heard.     No friction rub.   Pulmonary:      Effort: Pulmonary effort is normal.      Breath sounds: Decreased breath sounds (throughout) present.   Abdominal:      Palpations: There is no hepatomegaly, splenomegaly or mass.   Musculoskeletal:         General: No swelling or deformity. Normal range of motion.      Cervical back: Neck supple.      Right lower leg: Edema present.      Left lower leg: Edema present.   Lymphadenopathy:      Cervical: No cervical adenopathy.      Upper Body:      Right upper body: No axillary adenopathy.      Left upper body: No axillary adenopathy.      Lower Body: No right inguinal adenopathy. No left inguinal adenopathy.   Neurological:      Mental Status: He is oriented to person,  place, and time.      Cranial Nerves: Cranial nerves 2-12 are intact. No cranial nerve deficit.      Sensory: No sensory deficit.      Motor: Motor function is intact. No weakness.      Gait: Gait is intact.      Deep Tendon Reflexes: Reflexes normal.   Psychiatric:         Mood and Affect: Mood normal. Mood is not anxious or depressed. Affect is not angry.         Behavior: Behavior is not agitated.         Thought Content: Thought content normal.         Judgment: Judgment normal.     LAB WORK:  Laboratory studies reviewed.    Assessment/Plan  Problem List Items Addressed This Visit             ICD-10-CM       Advance Directives and General Issues    At risk for falls Z91.81       Cardiac and Vasculature    Atrial fibrillation (CMS/HCC) I48.91    Benign hypertension I10    Congestive heart failure (CMS/HCC) - Primary I50.9       Gastrointestinal and Abdominal    Gastroesophageal reflux disease without esophagitis K21.9       Mental Health    Depression F32.A     Other Visit Diagnoses         Codes    COPD on long-term inhaled steroid therapy (CMS/HCC)     J44.9, Z79.51    Type 2 diabetes mellitus without complication, with long-term current use of insulin (CMS/HCC)     E11.9, Z79.4    Weakness     R53.1        1. Congestive heart failure, on diuretic.  2. COPD, on bronchodilator therapy.  3. Atrial fibrillation, rate controlled.  4. Diabetes, on insulin.  5. Depression, on medication.  6. Weakness, on PT and OT.  7. Fall risk, fall precaution.  8. Hypertension, medications controlled.  9. Reflux disease, on PPI.    Scribe Attestation  By signing my name below, ILauren, Scribe attest that this documentation has been prepared under the direction and in the presence of Santiago Hernandez MD.       Electronically Signed By: Santiago Hernandez MD   3/7/24  3:06 PM

## 2024-03-04 VITALS
HEART RATE: 78 BPM | TEMPERATURE: 97.9 F | RESPIRATION RATE: 18 BRPM | DIASTOLIC BLOOD PRESSURE: 82 MMHG | SYSTOLIC BLOOD PRESSURE: 130 MMHG

## 2024-03-04 ASSESSMENT — ENCOUNTER SYMPTOMS
FEVER: 0
CHILLS: 0

## 2024-03-04 NOTE — PROGRESS NOTES
PLACE OF SERVICE:  [facility].    This is a subsequent visit.    Subjective   Patient ID: Lucas Valderrama is a 73 y.o. male who presents for Follow-up.    Mr. Lucas Valderrama is a 73-year-old male with history of COPD and congestive heart failure.  He has several medical issues and is unable to care for himself.  He requires supportive care.    Review of Systems   Constitutional:  Negative for chills and fever.   Cardiovascular:  Negative for chest pain.   All other systems reviewed and are negative.    Objective   /82   Pulse 78   Temp 36.6 °C (97.9 °F)   Resp 18     Physical Exam  Vitals reviewed.   Constitutional:       General: He is not in acute distress.     Comments: This is a well-developed, well-nourished male, sitting in a chair.   HENT:      Right Ear: Tympanic membrane, ear canal and external ear normal.      Left Ear: Tympanic membrane, ear canal and external ear normal.   Eyes:      General: No scleral icterus.     Pupils: Pupils are equal, round, and reactive to light.   Neck:      Vascular: No carotid bruit.   Cardiovascular:      Heart sounds: Normal heart sounds, S1 normal and S2 normal. No murmur heard.     No friction rub.   Pulmonary:      Effort: Pulmonary effort is normal.      Breath sounds: Decreased breath sounds (throughout) present.   Abdominal:      Palpations: There is no hepatomegaly, splenomegaly or mass.   Musculoskeletal:         General: No swelling or deformity. Normal range of motion.      Cervical back: Neck supple.      Right lower leg: Edema present.      Left lower leg: Edema present.   Lymphadenopathy:      Cervical: No cervical adenopathy.      Upper Body:      Right upper body: No axillary adenopathy.      Left upper body: No axillary adenopathy.      Lower Body: No right inguinal adenopathy. No left inguinal adenopathy.   Neurological:      Mental Status: He is oriented to person, place, and time.      Cranial Nerves: Cranial nerves 2-12 are intact. No  cranial nerve deficit.      Sensory: No sensory deficit.      Motor: Motor function is intact. No weakness.      Gait: Gait is intact.      Deep Tendon Reflexes: Reflexes normal.   Psychiatric:         Mood and Affect: Mood normal. Mood is not anxious or depressed. Affect is not angry.         Behavior: Behavior is not agitated.         Thought Content: Thought content normal.         Judgment: Judgment normal.     LAB WORK:  Laboratory studies reviewed.    Assessment/Plan   Problem List Items Addressed This Visit             ICD-10-CM       Advance Directives and General Issues    At risk for falls Z91.81       Cardiac and Vasculature    Atrial fibrillation (CMS/HCC) I48.91    Benign hypertension I10    Congestive heart failure (CMS/HCC) - Primary I50.9       Gastrointestinal and Abdominal    Gastroesophageal reflux disease without esophagitis K21.9       Mental Health    Depression F32.A     Other Visit Diagnoses         Codes    COPD on long-term inhaled steroid therapy (CMS/HCC)     J44.9, Z79.51    Type 2 diabetes mellitus without complication, with long-term current use of insulin (CMS/HCC)     E11.9, Z79.4    Weakness     R53.1        1. Congestive heart failure, on diuretic.  2. COPD, on bronchodilator therapy.  3. Atrial fibrillation, rate controlled.  4. Diabetes, on insulin.  5. Depression, on medication.  6. Weakness, on PT and OT.  7. Fall risk, fall precaution.  8. Hypertension, medications controlled.  9. Reflux disease, on PPI.    Scribe Attestation  By signing my name below, Lauren MARSH Scribe attest that this documentation has been prepared under the direction and in the presence of Santiago Hernandez MD.

## 2024-03-30 ENCOUNTER — NURSING HOME VISIT (OUTPATIENT)
Dept: POST ACUTE CARE | Facility: EXTERNAL LOCATION | Age: 74
End: 2024-03-30
Payer: MEDICARE

## 2024-03-30 DIAGNOSIS — Z79.4 TYPE 2 DIABETES MELLITUS WITHOUT COMPLICATION, WITH LONG-TERM CURRENT USE OF INSULIN (MULTI): ICD-10-CM

## 2024-03-30 DIAGNOSIS — I50.9 CONGESTIVE HEART FAILURE, UNSPECIFIED HF CHRONICITY, UNSPECIFIED HEART FAILURE TYPE (MULTI): Primary | ICD-10-CM

## 2024-03-30 DIAGNOSIS — I48.91 ATRIAL FIBRILLATION, UNSPECIFIED TYPE (MULTI): ICD-10-CM

## 2024-03-30 DIAGNOSIS — I10 BENIGN HYPERTENSION: ICD-10-CM

## 2024-03-30 DIAGNOSIS — Z91.81 AT RISK FOR FALLS: ICD-10-CM

## 2024-03-30 DIAGNOSIS — F32.A DEPRESSION, UNSPECIFIED DEPRESSION TYPE: ICD-10-CM

## 2024-03-30 DIAGNOSIS — R41.89 COGNITIVE DECLINE: ICD-10-CM

## 2024-03-30 DIAGNOSIS — Z79.51 COPD ON LONG-TERM INHALED STEROID THERAPY (MULTI): ICD-10-CM

## 2024-03-30 DIAGNOSIS — J44.9 COPD ON LONG-TERM INHALED STEROID THERAPY (MULTI): ICD-10-CM

## 2024-03-30 DIAGNOSIS — R53.1 WEAKNESS: ICD-10-CM

## 2024-03-30 DIAGNOSIS — K21.9 GASTROESOPHAGEAL REFLUX DISEASE WITHOUT ESOPHAGITIS: ICD-10-CM

## 2024-03-30 DIAGNOSIS — E11.9 TYPE 2 DIABETES MELLITUS WITHOUT COMPLICATION, WITH LONG-TERM CURRENT USE OF INSULIN (MULTI): ICD-10-CM

## 2024-03-30 PROCEDURE — 99309 SBSQ NF CARE MODERATE MDM 30: CPT | Performed by: INTERNAL MEDICINE

## 2024-03-30 NOTE — LETTER
Patient: Lucas Valderrama  : 1950    Encounter Date: 2024    PLACE OF SERVICE:  [facility].    This is a subsequent visit.    Subjective  Patient ID: Lucas Valderrama is a 74 y.o. male who presents for Follow-up.    Mr. Lucas Valderrama is a 74-year-old male with history of heart failure as well as COPD.  He has several medical issues and is unable to care for himself.  He requires supportive care.    Review of Systems   Constitutional:  Negative for chills and fever.   Cardiovascular:  Negative for chest pain.   All other systems reviewed and are negative.    Objective  /78   Pulse 82   Temp 36.7 °C (98 °F)   Resp 18     Physical Exam  Vitals reviewed.   Constitutional:       General: He is not in acute distress.     Comments: This is a well-developed, well-nourished male, sitting in a chair.   HENT:      Right Ear: Tympanic membrane, ear canal and external ear normal.      Left Ear: Tympanic membrane, ear canal and external ear normal.   Eyes:      General: No scleral icterus.     Pupils: Pupils are equal, round, and reactive to light.   Neck:      Vascular: No carotid bruit.   Cardiovascular:      Heart sounds: Normal heart sounds, S1 normal and S2 normal. No murmur heard.     No friction rub.   Pulmonary:      Effort: Pulmonary effort is normal.      Breath sounds: Decreased breath sounds (throughout) present.   Abdominal:      Palpations: There is no hepatomegaly, splenomegaly or mass.   Musculoskeletal:         General: No swelling or deformity. Normal range of motion.      Cervical back: Neck supple.      Right lower leg: Edema present.      Left lower leg: Edema present.   Lymphadenopathy:      Cervical: No cervical adenopathy.      Upper Body:      Right upper body: No axillary adenopathy.      Left upper body: No axillary adenopathy.      Lower Body: No right inguinal adenopathy. No left inguinal adenopathy.   Neurological:      Mental Status: He is oriented to person, place, and  time.      Cranial Nerves: Cranial nerves 2-12 are intact. No cranial nerve deficit.      Sensory: No sensory deficit.      Motor: Motor function is intact. No weakness.      Gait: Gait is intact.      Deep Tendon Reflexes: Reflexes normal.   Psychiatric:         Mood and Affect: Mood normal. Mood is not anxious or depressed. Affect is not angry.         Behavior: Behavior is not agitated.         Thought Content: Thought content normal.         Judgment: Judgment normal.     LAB WORK:  Laboratory studies were reviewed.    Assessment/Plan  Problem List Items Addressed This Visit             ICD-10-CM       Advance Directives and General Issues    At risk for falls Z91.81       Cardiac and Vasculature    Atrial fibrillation (CMS/HCC) I48.91    Benign hypertension I10    Congestive heart failure (CMS/HCC) - Primary I50.9       Gastrointestinal and Abdominal    Gastroesophageal reflux disease without esophagitis K21.9       Mental Health    Depression F32.A     Other Visit Diagnoses         Codes    COPD on long-term inhaled steroid therapy (CMS/HCC)     J44.9, Z79.51    Type 2 diabetes mellitus without complication, with long-term current use of insulin (CMS/HCC)     E11.9, Z79.4    Weakness     R53.1    Cognitive decline     R41.89        1. Heart failure, on diuretic.  2. COPD, on bronchodilator therapy.  3. Diabetes, on insulin.  4. Atrial fibrillation, rate controlled.  5. Depression, on medication.  6. Weakness, on PT/OT.  7. Fall risk, on fall precautions.  8. Hypertension, medically controlled.  9. Reflux disease, on PPI.  10. Cognitive deficit, on supportive care.    Scribe Attestation  By signing my name below, I, Keshav Simmons attest that this documentation has been prepared under the direction and in the presence of Santiago Hernandez MD.   All medical record entries made by the scribcristina were personally dictated by me I have reviewed the chart and agree the record accurately reflects my personal performance  of his history physical examination and management          Electronically Signed By: Santiago Hernandez MD   4/7/24 10:55 PM

## 2024-04-03 VITALS
DIASTOLIC BLOOD PRESSURE: 78 MMHG | SYSTOLIC BLOOD PRESSURE: 130 MMHG | RESPIRATION RATE: 18 BRPM | HEART RATE: 82 BPM | TEMPERATURE: 98 F

## 2024-04-03 ASSESSMENT — ENCOUNTER SYMPTOMS
CHILLS: 0
FEVER: 0

## 2024-04-03 NOTE — PROGRESS NOTES
PLACE OF SERVICE:  [facility].    This is a subsequent visit.    Subjective   Patient ID: Lucas Valderrama is a 74 y.o. male who presents for Follow-up.    Mr. Lucas Valderrama is a 74-year-old male with history of heart failure as well as COPD.  He has several medical issues and is unable to care for himself.  He requires supportive care.    Review of Systems   Constitutional:  Negative for chills and fever.   Cardiovascular:  Negative for chest pain.   All other systems reviewed and are negative.    Objective   /78   Pulse 82   Temp 36.7 °C (98 °F)   Resp 18     Physical Exam  Vitals reviewed.   Constitutional:       General: He is not in acute distress.     Comments: This is a well-developed, well-nourished male, sitting in a chair.   HENT:      Right Ear: Tympanic membrane, ear canal and external ear normal.      Left Ear: Tympanic membrane, ear canal and external ear normal.   Eyes:      General: No scleral icterus.     Pupils: Pupils are equal, round, and reactive to light.   Neck:      Vascular: No carotid bruit.   Cardiovascular:      Heart sounds: Normal heart sounds, S1 normal and S2 normal. No murmur heard.     No friction rub.   Pulmonary:      Effort: Pulmonary effort is normal.      Breath sounds: Decreased breath sounds (throughout) present.   Abdominal:      Palpations: There is no hepatomegaly, splenomegaly or mass.   Musculoskeletal:         General: No swelling or deformity. Normal range of motion.      Cervical back: Neck supple.      Right lower leg: Edema present.      Left lower leg: Edema present.   Lymphadenopathy:      Cervical: No cervical adenopathy.      Upper Body:      Right upper body: No axillary adenopathy.      Left upper body: No axillary adenopathy.      Lower Body: No right inguinal adenopathy. No left inguinal adenopathy.   Neurological:      Mental Status: He is oriented to person, place, and time.      Cranial Nerves: Cranial nerves 2-12 are intact. No cranial  nerve deficit.      Sensory: No sensory deficit.      Motor: Motor function is intact. No weakness.      Gait: Gait is intact.      Deep Tendon Reflexes: Reflexes normal.   Psychiatric:         Mood and Affect: Mood normal. Mood is not anxious or depressed. Affect is not angry.         Behavior: Behavior is not agitated.         Thought Content: Thought content normal.         Judgment: Judgment normal.     LAB WORK:  Laboratory studies were reviewed.    Assessment/Plan   Problem List Items Addressed This Visit             ICD-10-CM       Advance Directives and General Issues    At risk for falls Z91.81       Cardiac and Vasculature    Atrial fibrillation (CMS/HCC) I48.91    Benign hypertension I10    Congestive heart failure (CMS/HCC) - Primary I50.9       Gastrointestinal and Abdominal    Gastroesophageal reflux disease without esophagitis K21.9       Mental Health    Depression F32.A     Other Visit Diagnoses         Codes    COPD on long-term inhaled steroid therapy (CMS/HCC)     J44.9, Z79.51    Type 2 diabetes mellitus without complication, with long-term current use of insulin (CMS/HCC)     E11.9, Z79.4    Weakness     R53.1    Cognitive decline     R41.89        1. Heart failure, on diuretic.  2. COPD, on bronchodilator therapy.  3. Diabetes, on insulin.  4. Atrial fibrillation, rate controlled.  5. Depression, on medication.  6. Weakness, on PT/OT.  7. Fall risk, on fall precautions.  8. Hypertension, medically controlled.  9. Reflux disease, on PPI.  10. Cognitive deficit, on supportive care.    Scribe Attestation  By signing my name below, ILauren Scribe attest that this documentation has been prepared under the direction and in the presence of Santiago Hernandez MD.   All medical record entries made by the scribe were personally dictated by me I have reviewed the chart and agree the record accurately reflects my personal performance of his history physical examination and management

## 2024-04-06 ENCOUNTER — NURSING HOME VISIT (OUTPATIENT)
Dept: POST ACUTE CARE | Facility: EXTERNAL LOCATION | Age: 74
End: 2024-04-06
Payer: MEDICARE

## 2024-04-06 DIAGNOSIS — Z79.51 COPD ON LONG-TERM INHALED STEROID THERAPY (MULTI): ICD-10-CM

## 2024-04-06 DIAGNOSIS — I50.9 CONGESTIVE HEART FAILURE, UNSPECIFIED HF CHRONICITY, UNSPECIFIED HEART FAILURE TYPE (MULTI): Primary | ICD-10-CM

## 2024-04-06 DIAGNOSIS — Z79.4 TYPE 2 DIABETES MELLITUS WITHOUT COMPLICATION, WITH LONG-TERM CURRENT USE OF INSULIN (MULTI): ICD-10-CM

## 2024-04-06 DIAGNOSIS — J44.9 COPD ON LONG-TERM INHALED STEROID THERAPY (MULTI): ICD-10-CM

## 2024-04-06 DIAGNOSIS — R53.1 WEAKNESS: ICD-10-CM

## 2024-04-06 DIAGNOSIS — Z91.81 AT RISK FOR FALLS: ICD-10-CM

## 2024-04-06 DIAGNOSIS — K21.9 GASTROESOPHAGEAL REFLUX DISEASE WITHOUT ESOPHAGITIS: ICD-10-CM

## 2024-04-06 DIAGNOSIS — F32.A DEPRESSION, UNSPECIFIED DEPRESSION TYPE: ICD-10-CM

## 2024-04-06 DIAGNOSIS — I10 BENIGN HYPERTENSION: ICD-10-CM

## 2024-04-06 DIAGNOSIS — E11.9 TYPE 2 DIABETES MELLITUS WITHOUT COMPLICATION, WITH LONG-TERM CURRENT USE OF INSULIN (MULTI): ICD-10-CM

## 2024-04-06 DIAGNOSIS — I48.91 ATRIAL FIBRILLATION, UNSPECIFIED TYPE (MULTI): ICD-10-CM

## 2024-04-06 PROCEDURE — 99309 SBSQ NF CARE MODERATE MDM 30: CPT | Performed by: INTERNAL MEDICINE

## 2024-04-06 NOTE — LETTER
Patient: Lucas Valderrama  : 1950    Encounter Date: 2024    PLACE OF SERVICE:  [facility]    This is a subsequent visit.    Subjective  Patient ID: Lucas Valderrama is a 74 y.o. male who presents for Follow-up.    Mr. Lucas Valderrama is a 74-year-old male with history of CHF and COPD.  He suffers from weakness and deconditioning.  He is unable to care for himself.  He requires supportive care.    Review of Systems   Constitutional:  Negative for chills and fever.   Cardiovascular:  Negative for chest pain.   All other systems reviewed and are negative.    Objective  /82   Pulse 82   Temp 36.6 °C (97.8 °F)   Resp 18     Physical Exam  Vitals reviewed.   Constitutional:       General: He is not in acute distress.     Comments: This is a well-developed, well-nourished male sitting in chair   HENT:      Right Ear: Tympanic membrane, ear canal and external ear normal.      Left Ear: Tympanic membrane, ear canal and external ear normal.   Eyes:      General: No scleral icterus.     Pupils: Pupils are equal, round, and reactive to light.   Neck:      Vascular: No carotid bruit.   Cardiovascular:      Heart sounds: Normal heart sounds, S1 normal and S2 normal. No murmur heard.     No friction rub.   Pulmonary:      Effort: Pulmonary effort is normal.      Breath sounds: Decreased breath sounds (throughout.) present.   Abdominal:      Palpations: There is no hepatomegaly, splenomegaly or mass.   Musculoskeletal:         General: No swelling or deformity. Normal range of motion.      Cervical back: Neck supple.      Right lower leg: Edema present.      Left lower leg: Edema present.   Lymphadenopathy:      Cervical: No cervical adenopathy.      Upper Body:      Right upper body: No axillary adenopathy.      Left upper body: No axillary adenopathy.      Lower Body: No right inguinal adenopathy. No left inguinal adenopathy.   Neurological:      Mental Status: He is oriented to person, place, and  time.      Cranial Nerves: Cranial nerves 2-12 are intact. No cranial nerve deficit.      Sensory: No sensory deficit.      Motor: Motor function is intact. No weakness.      Gait: Gait is intact.      Deep Tendon Reflexes: Reflexes normal.   Psychiatric:         Mood and Affect: Mood normal. Mood is not anxious or depressed. Affect is not angry.         Behavior: Behavior is not agitated.         Thought Content: Thought content normal.         Judgment: Judgment normal.     LAB WORK: Laboratory studies reviewed.    Assessment/Plan  Problem List Items Addressed This Visit             ICD-10-CM       Advance Directives and General Issues    At risk for falls Z91.81       Cardiac and Vasculature    Atrial fibrillation (CMS/HCC) I48.91    Benign hypertension I10    Congestive heart failure (CMS/HCC) - Primary I50.9       Gastrointestinal and Abdominal    Gastroesophageal reflux disease without esophagitis K21.9       Mental Health    Depression F32.A     Other Visit Diagnoses         Codes    Type 2 diabetes mellitus without complication, with long-term current use of insulin (CMS/HCC)     E11.9, Z79.4    COPD on long-term inhaled steroid therapy (CMS/HCC)     J44.9, Z79.51    Weakness     R53.1        1. Heart failure, on diuretic.  2. Diabetes, on insulin.  3. COPD, on bronchodilator therapy.  4. Atrial fibrillation, rate control.  5. Depression, on medication.  6. Diabetes, on insulin.  7. Weakness, on PT/OT.  8. Fall risk, fall precaution.  9. Hypertension, med controlled.  10. Reflux disease, on PPI.    Scribe Attestation  By signing my name below, IMarion Scribe attest that this documentation has been prepared under the direction and in the presence of Santiago Hernandez MD.       All medical record entries made by the scribe were personally dictated by me I have reviewed the chart and agree the record accurately reflects my personal performance of his history physical examination and  management      Electronically Signed By: Santiago Hernandez MD   4/8/24 11:51 PM

## 2024-04-08 VITALS
SYSTOLIC BLOOD PRESSURE: 128 MMHG | RESPIRATION RATE: 18 BRPM | DIASTOLIC BLOOD PRESSURE: 82 MMHG | TEMPERATURE: 97.8 F | HEART RATE: 82 BPM

## 2024-04-08 ASSESSMENT — ENCOUNTER SYMPTOMS
CHILLS: 0
FEVER: 0

## 2024-04-08 NOTE — PROGRESS NOTES
PLACE OF SERVICE:  [facility]    This is a subsequent visit.    Subjective   Patient ID: Lucas Valderrama is a 74 y.o. male who presents for Follow-up.    Mr. Lucas Valderrama is a 74-year-old male with history of CHF and COPD.  He suffers from weakness and deconditioning.  He is unable to care for himself.  He requires supportive care.    Review of Systems   Constitutional:  Negative for chills and fever.   Cardiovascular:  Negative for chest pain.   All other systems reviewed and are negative.    Objective   /82   Pulse 82   Temp 36.6 °C (97.8 °F)   Resp 18     Physical Exam  Vitals reviewed.   Constitutional:       General: He is not in acute distress.     Comments: This is a well-developed, well-nourished male sitting in chair   HENT:      Right Ear: Tympanic membrane, ear canal and external ear normal.      Left Ear: Tympanic membrane, ear canal and external ear normal.   Eyes:      General: No scleral icterus.     Pupils: Pupils are equal, round, and reactive to light.   Neck:      Vascular: No carotid bruit.   Cardiovascular:      Heart sounds: Normal heart sounds, S1 normal and S2 normal. No murmur heard.     No friction rub.   Pulmonary:      Effort: Pulmonary effort is normal.      Breath sounds: Decreased breath sounds (throughout.) present.   Abdominal:      Palpations: There is no hepatomegaly, splenomegaly or mass.   Musculoskeletal:         General: No swelling or deformity. Normal range of motion.      Cervical back: Neck supple.      Right lower leg: Edema present.      Left lower leg: Edema present.   Lymphadenopathy:      Cervical: No cervical adenopathy.      Upper Body:      Right upper body: No axillary adenopathy.      Left upper body: No axillary adenopathy.      Lower Body: No right inguinal adenopathy. No left inguinal adenopathy.   Neurological:      Mental Status: He is oriented to person, place, and time.      Cranial Nerves: Cranial nerves 2-12 are intact. No cranial  nerve deficit.      Sensory: No sensory deficit.      Motor: Motor function is intact. No weakness.      Gait: Gait is intact.      Deep Tendon Reflexes: Reflexes normal.   Psychiatric:         Mood and Affect: Mood normal. Mood is not anxious or depressed. Affect is not angry.         Behavior: Behavior is not agitated.         Thought Content: Thought content normal.         Judgment: Judgment normal.     LAB WORK: Laboratory studies reviewed.    Assessment/Plan   Problem List Items Addressed This Visit             ICD-10-CM       Advance Directives and General Issues    At risk for falls Z91.81       Cardiac and Vasculature    Atrial fibrillation (CMS/HCC) I48.91    Benign hypertension I10    Congestive heart failure (CMS/HCC) - Primary I50.9       Gastrointestinal and Abdominal    Gastroesophageal reflux disease without esophagitis K21.9       Mental Health    Depression F32.A     Other Visit Diagnoses         Codes    Type 2 diabetes mellitus without complication, with long-term current use of insulin (CMS/HCC)     E11.9, Z79.4    COPD on long-term inhaled steroid therapy (CMS/HCC)     J44.9, Z79.51    Weakness     R53.1        1. Heart failure, on diuretic.  2. Diabetes, on insulin.  3. COPD, on bronchodilator therapy.  4. Atrial fibrillation, rate control.  5. Depression, on medication.  6. Diabetes, on insulin.  7. Weakness, on PT/OT.  8. Fall risk, fall precaution.  9. Hypertension, med controlled.  10. Reflux disease, on PPI.    Scribe Attestation  By signing my name below, IMarion Scribe attest that this documentation has been prepared under the direction and in the presence of Santiago Hernandez MD.       All medical record entries made by the scribe were personally dictated by me I have reviewed the chart and agree the record accurately reflects my personal performance of his history physical examination and management

## 2024-04-14 ENCOUNTER — NURSING HOME VISIT (OUTPATIENT)
Dept: POST ACUTE CARE | Facility: EXTERNAL LOCATION | Age: 74
End: 2024-04-14
Payer: MEDICARE

## 2024-04-14 DIAGNOSIS — K21.9 GASTROESOPHAGEAL REFLUX DISEASE WITHOUT ESOPHAGITIS: ICD-10-CM

## 2024-04-14 DIAGNOSIS — F32.A DEPRESSION, UNSPECIFIED DEPRESSION TYPE: ICD-10-CM

## 2024-04-14 DIAGNOSIS — I10 BENIGN HYPERTENSION: ICD-10-CM

## 2024-04-14 DIAGNOSIS — I50.9 CONGESTIVE HEART FAILURE, UNSPECIFIED HF CHRONICITY, UNSPECIFIED HEART FAILURE TYPE (MULTI): Primary | ICD-10-CM

## 2024-04-14 DIAGNOSIS — Z91.81 AT RISK FOR FALLS: ICD-10-CM

## 2024-04-14 DIAGNOSIS — R41.89 COGNITIVE DECLINE: ICD-10-CM

## 2024-04-14 DIAGNOSIS — E11.9 TYPE 2 DIABETES MELLITUS WITHOUT COMPLICATION, WITH LONG-TERM CURRENT USE OF INSULIN (MULTI): ICD-10-CM

## 2024-04-14 DIAGNOSIS — I48.91 ATRIAL FIBRILLATION, UNSPECIFIED TYPE (MULTI): ICD-10-CM

## 2024-04-14 DIAGNOSIS — Z79.4 TYPE 2 DIABETES MELLITUS WITHOUT COMPLICATION, WITH LONG-TERM CURRENT USE OF INSULIN (MULTI): ICD-10-CM

## 2024-04-14 DIAGNOSIS — R53.1 WEAKNESS: ICD-10-CM

## 2024-04-14 DIAGNOSIS — Z79.51 COPD ON LONG-TERM INHALED STEROID THERAPY (MULTI): ICD-10-CM

## 2024-04-14 DIAGNOSIS — J44.9 COPD ON LONG-TERM INHALED STEROID THERAPY (MULTI): ICD-10-CM

## 2024-04-14 PROCEDURE — 99309 SBSQ NF CARE MODERATE MDM 30: CPT | Performed by: INTERNAL MEDICINE

## 2024-04-14 NOTE — LETTER
Patient: Lucas Valderrama  : 1950    Encounter Date: 2024    PLACE OF SERVICE:  [facility].    This is a subsequent visit.    Subjective  Patient ID: Lucas Valderrama is a 74 y.o. male who presents for Follow-up.    Mr. Lucas Valderrama is a 74-year-old male with history of CHF and COPD.  He has multiple medical issues including weakness and deconditioning.  He is unable to care for himself and requires supportive care.    Review of Systems   Constitutional:  Negative for chills and fever.   Cardiovascular:  Negative for chest pain.   All other systems reviewed and are negative.    Objective  /80   Pulse 78   Temp 36.7 °C (98.1 °F)   Resp 18     Physical Exam  Vitals reviewed.   Constitutional:       Comments: This is a well-developed, well-nourished male, lying in bed, appearing weak.   HENT:      Right Ear: Tympanic membrane, ear canal and external ear normal.      Left Ear: Tympanic membrane, ear canal and external ear normal.   Eyes:      General: No scleral icterus.     Pupils: Pupils are equal, round, and reactive to light.   Neck:      Vascular: No carotid bruit.   Cardiovascular:      Heart sounds: Normal heart sounds, S1 normal and S2 normal. No murmur heard.     No friction rub.   Pulmonary:      Effort: Pulmonary effort is normal.      Breath sounds: Decreased breath sounds (throughout) present.   Abdominal:      Palpations: There is no hepatomegaly, splenomegaly or mass.   Musculoskeletal:         General: No swelling or deformity. Normal range of motion.      Cervical back: Neck supple.      Right lower leg: Edema present.      Left lower leg: Edema present.   Lymphadenopathy:      Cervical: No cervical adenopathy.      Upper Body:      Right upper body: No axillary adenopathy.      Left upper body: No axillary adenopathy.      Lower Body: No right inguinal adenopathy. No left inguinal adenopathy.   Neurological:      Mental Status: He is oriented to person, place, and time. He  is lethargic.      Cranial Nerves: Cranial nerves 2-12 are intact. No cranial nerve deficit.      Sensory: No sensory deficit.      Motor: Motor function is intact. No weakness.      Gait: Gait is intact.      Deep Tendon Reflexes: Reflexes normal.   Psychiatric:         Mood and Affect: Mood normal. Mood is not anxious or depressed. Affect is not angry.         Behavior: Behavior is not agitated.         Thought Content: Thought content normal.         Judgment: Judgment normal.     LAB WORK:  Laboratory studies were reviewed.    Assessment/Plan  Problem List Items Addressed This Visit             ICD-10-CM       Advance Directives and General Issues    At risk for falls Z91.81       Cardiac and Vasculature    Atrial fibrillation (Multi) I48.91    Benign hypertension I10    Congestive heart failure (Multi) - Primary I50.9       Gastrointestinal and Abdominal    Gastroesophageal reflux disease without esophagitis K21.9       Mental Health    Depression F32.A     Other Visit Diagnoses         Codes    COPD on long-term inhaled steroid therapy (Multi)     J44.9, Z79.51    Type 2 diabetes mellitus without complication, with long-term current use of insulin (Multi)     E11.9, Z79.4    Cognitive decline     R41.89    Weakness     R53.1        1. Congestive heart failure, on diuretic.  2. COPD, on bronchodilator therapy.  3. Atrial fibrillation, rate controlled.  4. Diabetes, on insulin.  5. Depression, on medication.  6. Cognitive decline, on supportive care.  7. Reflux disease, on PPI.  8. Hypertension, medically controlled.  9. Weakness, on PT/OT.  10. Fall risk, on fall precautions.    Scribe Attestation  By signing my name below, I, Keshav Simmons attest that this documentation has been prepared under the direction and in the presence of Santiago Hernandez MD.     All medical record entries made by the scribe were personally dictated by me I have reviewed the chart and agree the record accurately reflects my  personal performance of his history physical examination and management      Electronically Signed By: Santiago Hernandez MD   4/19/24 12:43 AM

## 2024-04-18 VITALS
HEART RATE: 78 BPM | RESPIRATION RATE: 18 BRPM | TEMPERATURE: 98.1 F | SYSTOLIC BLOOD PRESSURE: 124 MMHG | DIASTOLIC BLOOD PRESSURE: 80 MMHG

## 2024-04-18 ASSESSMENT — ENCOUNTER SYMPTOMS
FEVER: 0
CHILLS: 0

## 2024-04-18 NOTE — PROGRESS NOTES
PLACE OF SERVICE:  [facility].    This is a subsequent visit.    Subjective   Patient ID: Lucas Valderrama is a 74 y.o. male who presents for Follow-up.    Mr. Lucas Valderrama is a 74-year-old male with history of CHF and COPD.  He has multiple medical issues including weakness and deconditioning.  He is unable to care for himself and requires supportive care.    Review of Systems   Constitutional:  Negative for chills and fever.   Cardiovascular:  Negative for chest pain.   All other systems reviewed and are negative.    Objective   /80   Pulse 78   Temp 36.7 °C (98.1 °F)   Resp 18     Physical Exam  Vitals reviewed.   Constitutional:       Comments: This is a well-developed, well-nourished male, lying in bed, appearing weak.   HENT:      Right Ear: Tympanic membrane, ear canal and external ear normal.      Left Ear: Tympanic membrane, ear canal and external ear normal.   Eyes:      General: No scleral icterus.     Pupils: Pupils are equal, round, and reactive to light.   Neck:      Vascular: No carotid bruit.   Cardiovascular:      Heart sounds: Normal heart sounds, S1 normal and S2 normal. No murmur heard.     No friction rub.   Pulmonary:      Effort: Pulmonary effort is normal.      Breath sounds: Decreased breath sounds (throughout) present.   Abdominal:      Palpations: There is no hepatomegaly, splenomegaly or mass.   Musculoskeletal:         General: No swelling or deformity. Normal range of motion.      Cervical back: Neck supple.      Right lower leg: Edema present.      Left lower leg: Edema present.   Lymphadenopathy:      Cervical: No cervical adenopathy.      Upper Body:      Right upper body: No axillary adenopathy.      Left upper body: No axillary adenopathy.      Lower Body: No right inguinal adenopathy. No left inguinal adenopathy.   Neurological:      Mental Status: He is oriented to person, place, and time. He is lethargic.      Cranial Nerves: Cranial nerves 2-12 are intact. No  cranial nerve deficit.      Sensory: No sensory deficit.      Motor: Motor function is intact. No weakness.      Gait: Gait is intact.      Deep Tendon Reflexes: Reflexes normal.   Psychiatric:         Mood and Affect: Mood normal. Mood is not anxious or depressed. Affect is not angry.         Behavior: Behavior is not agitated.         Thought Content: Thought content normal.         Judgment: Judgment normal.     LAB WORK:  Laboratory studies were reviewed.    Assessment/Plan   Problem List Items Addressed This Visit             ICD-10-CM       Advance Directives and General Issues    At risk for falls Z91.81       Cardiac and Vasculature    Atrial fibrillation (Multi) I48.91    Benign hypertension I10    Congestive heart failure (Multi) - Primary I50.9       Gastrointestinal and Abdominal    Gastroesophageal reflux disease without esophagitis K21.9       Mental Health    Depression F32.A     Other Visit Diagnoses         Codes    COPD on long-term inhaled steroid therapy (Multi)     J44.9, Z79.51    Type 2 diabetes mellitus without complication, with long-term current use of insulin (Multi)     E11.9, Z79.4    Cognitive decline     R41.89    Weakness     R53.1        1. Congestive heart failure, on diuretic.  2. COPD, on bronchodilator therapy.  3. Atrial fibrillation, rate controlled.  4. Diabetes, on insulin.  5. Depression, on medication.  6. Cognitive decline, on supportive care.  7. Reflux disease, on PPI.  8. Hypertension, medically controlled.  9. Weakness, on PT/OT.  10. Fall risk, on fall precautions.    Scribe Attestation  By signing my name below, ILauren Scribe attest that this documentation has been prepared under the direction and in the presence of Santiago Hernandez MD.     All medical record entries made by the scribe were personally dictated by me I have reviewed the chart and agree the record accurately reflects my personal performance of his history physical examination and management

## 2024-04-20 ENCOUNTER — NURSING HOME VISIT (OUTPATIENT)
Dept: POST ACUTE CARE | Facility: EXTERNAL LOCATION | Age: 74
End: 2024-04-20
Payer: MEDICARE

## 2024-04-20 DIAGNOSIS — R53.1 WEAKNESS: ICD-10-CM

## 2024-04-20 DIAGNOSIS — I48.91 ATRIAL FIBRILLATION, UNSPECIFIED TYPE (MULTI): ICD-10-CM

## 2024-04-20 DIAGNOSIS — Z79.4 TYPE 2 DIABETES MELLITUS WITHOUT COMPLICATION, WITH LONG-TERM CURRENT USE OF INSULIN (MULTI): ICD-10-CM

## 2024-04-20 DIAGNOSIS — Z79.51 COPD ON LONG-TERM INHALED STEROID THERAPY (MULTI): Primary | ICD-10-CM

## 2024-04-20 DIAGNOSIS — J44.9 COPD ON LONG-TERM INHALED STEROID THERAPY (MULTI): Primary | ICD-10-CM

## 2024-04-20 DIAGNOSIS — Z91.81 AT RISK FOR FALLS: ICD-10-CM

## 2024-04-20 DIAGNOSIS — F32.A DEPRESSION, UNSPECIFIED DEPRESSION TYPE: ICD-10-CM

## 2024-04-20 DIAGNOSIS — I50.9 CONGESTIVE HEART FAILURE, UNSPECIFIED HF CHRONICITY, UNSPECIFIED HEART FAILURE TYPE (MULTI): ICD-10-CM

## 2024-04-20 DIAGNOSIS — R41.89 COGNITIVE DECLINE: ICD-10-CM

## 2024-04-20 DIAGNOSIS — E11.9 TYPE 2 DIABETES MELLITUS WITHOUT COMPLICATION, WITH LONG-TERM CURRENT USE OF INSULIN (MULTI): ICD-10-CM

## 2024-04-20 PROCEDURE — 99308 SBSQ NF CARE LOW MDM 20: CPT | Performed by: INTERNAL MEDICINE

## 2024-04-20 NOTE — LETTER
Patient: Lucas Valderrama  : 1950    Encounter Date: 2024    PLACE OF SERVICE:  [facility]    This is a subsequent visit.    Subjective  Patient ID: Lucas Valderrama is a 74 y.o. male who presents for Follow-up.    Mr. Lucas Valderrama is a 74-year-old male with history of COPD with congestive heart failure.  He is diabetic, unable to care for himself.  He requires supportive care.    Review of Systems   Constitutional:  Negative for chills and fever.   Cardiovascular:  Negative for chest pain.   All other systems reviewed and are negative.    Objective  /80   Pulse 78   Temp 36.6 °C (97.9 °F)   Resp 18     Physical Exam  Vitals reviewed.   Constitutional:       Comments: This is a well-developed, well-nourished male, lying in bed, appearing weak and lethargic.   HENT:      Right Ear: Tympanic membrane, ear canal and external ear normal.      Left Ear: Tympanic membrane, ear canal and external ear normal.   Eyes:      General: No scleral icterus.     Pupils: Pupils are equal, round, and reactive to light.   Neck:      Vascular: No carotid bruit.   Cardiovascular:      Heart sounds: Normal heart sounds, S1 normal and S2 normal. No murmur heard.     No friction rub.   Pulmonary:      Effort: Pulmonary effort is normal.      Breath sounds: Decreased breath sounds (throughout.) present.   Abdominal:      Palpations: There is no hepatomegaly, splenomegaly or mass.   Musculoskeletal:         General: No swelling or deformity. Normal range of motion.      Cervical back: Neck supple.      Right lower leg: Edema present.      Left lower leg: Edema present.   Lymphadenopathy:      Cervical: No cervical adenopathy.      Upper Body:      Right upper body: No axillary adenopathy.      Left upper body: No axillary adenopathy.      Lower Body: No right inguinal adenopathy. No left inguinal adenopathy.   Neurological:      Mental Status: He is oriented to person, place, and time.      Cranial Nerves:  Cranial nerves 2-12 are intact. No cranial nerve deficit.      Sensory: No sensory deficit.      Motor: Motor function is intact. No weakness.      Gait: Gait is intact.      Deep Tendon Reflexes: Reflexes normal.   Psychiatric:         Mood and Affect: Mood normal. Mood is not anxious or depressed. Affect is not angry.         Behavior: Behavior is not agitated.         Thought Content: Thought content normal.         Judgment: Judgment normal.     LAB WORK:  Laboratory studies were reviewed.    Assessment/Plan  Problem List Items Addressed This Visit             ICD-10-CM       Advance Directives and General Issues    At risk for falls Z91.81       Cardiac and Vasculature    Atrial fibrillation (Multi) I48.91    Congestive heart failure (Multi) I50.9       Mental Health    Depression F32.A     Other Visit Diagnoses         Codes    COPD on long-term inhaled steroid therapy (Multi)    -  Primary J44.9, Z79.51    Type 2 diabetes mellitus without complication, with long-term current use of insulin (Multi)     E11.9, Z79.4    Weakness     R53.1    Cognitive decline     R41.89        1. COPD, on bronchodilator therapy.  2. Congestive heart failure, on diuretic.  3. Diabetes, on insulin.  4. Atrial fibrillation, rate controlled.  5. Depression, on medication.  6. Weakness, on PT/OT.  7. Fall risk, on fall precautions.  8. Cognitive decline, on supportive care.    Scribe Attestation  By signing my name below, I, Keshav Bonilla attest that this documentation has been prepared under the direction and in the presence of Santiago Hernandez MD.     All medical record entries made by the scribe were personally dictated by me I have reviewed the chart and agree the record accurately reflects my personal performance of his history physical examination and management      Electronically Signed By: Santiago Hernandez MD   4/25/24  1:14 AM

## 2024-04-22 VITALS
HEART RATE: 78 BPM | RESPIRATION RATE: 18 BRPM | TEMPERATURE: 97.9 F | DIASTOLIC BLOOD PRESSURE: 80 MMHG | SYSTOLIC BLOOD PRESSURE: 124 MMHG

## 2024-04-22 ASSESSMENT — ENCOUNTER SYMPTOMS
CHILLS: 0
FEVER: 0

## 2024-04-22 NOTE — PROGRESS NOTES
PLACE OF SERVICE:  [facility]    This is a subsequent visit.    Subjective   Patient ID: Lucas Valderrama is a 74 y.o. male who presents for Follow-up.    Mr. Lucas Valderrama is a 74-year-old male with history of COPD with congestive heart failure.  He is diabetic, unable to care for himself.  He requires supportive care.    Review of Systems   Constitutional:  Negative for chills and fever.   Cardiovascular:  Negative for chest pain.   All other systems reviewed and are negative.    Objective   /80   Pulse 78   Temp 36.6 °C (97.9 °F)   Resp 18     Physical Exam  Vitals reviewed.   Constitutional:       Comments: This is a well-developed, well-nourished male, lying in bed, appearing weak and lethargic.   HENT:      Right Ear: Tympanic membrane, ear canal and external ear normal.      Left Ear: Tympanic membrane, ear canal and external ear normal.   Eyes:      General: No scleral icterus.     Pupils: Pupils are equal, round, and reactive to light.   Neck:      Vascular: No carotid bruit.   Cardiovascular:      Heart sounds: Normal heart sounds, S1 normal and S2 normal. No murmur heard.     No friction rub.   Pulmonary:      Effort: Pulmonary effort is normal.      Breath sounds: Decreased breath sounds (throughout.) present.   Abdominal:      Palpations: There is no hepatomegaly, splenomegaly or mass.   Musculoskeletal:         General: No swelling or deformity. Normal range of motion.      Cervical back: Neck supple.      Right lower leg: Edema present.      Left lower leg: Edema present.   Lymphadenopathy:      Cervical: No cervical adenopathy.      Upper Body:      Right upper body: No axillary adenopathy.      Left upper body: No axillary adenopathy.      Lower Body: No right inguinal adenopathy. No left inguinal adenopathy.   Neurological:      Mental Status: He is oriented to person, place, and time.      Cranial Nerves: Cranial nerves 2-12 are intact. No cranial nerve deficit.      Sensory: No  sensory deficit.      Motor: Motor function is intact. No weakness.      Gait: Gait is intact.      Deep Tendon Reflexes: Reflexes normal.   Psychiatric:         Mood and Affect: Mood normal. Mood is not anxious or depressed. Affect is not angry.         Behavior: Behavior is not agitated.         Thought Content: Thought content normal.         Judgment: Judgment normal.     LAB WORK:  Laboratory studies were reviewed.    Assessment/Plan   Problem List Items Addressed This Visit             ICD-10-CM       Advance Directives and General Issues    At risk for falls Z91.81       Cardiac and Vasculature    Atrial fibrillation (Multi) I48.91    Congestive heart failure (Multi) I50.9       Mental Health    Depression F32.A     Other Visit Diagnoses         Codes    COPD on long-term inhaled steroid therapy (Multi)    -  Primary J44.9, Z79.51    Type 2 diabetes mellitus without complication, with long-term current use of insulin (Multi)     E11.9, Z79.4    Weakness     R53.1    Cognitive decline     R41.89        1. COPD, on bronchodilator therapy.  2. Congestive heart failure, on diuretic.  3. Diabetes, on insulin.  4. Atrial fibrillation, rate controlled.  5. Depression, on medication.  6. Weakness, on PT/OT.  7. Fall risk, on fall precautions.  8. Cognitive decline, on supportive care.    Scribe Attestation  By signing my name below, I, Keshav Bonilla attest that this documentation has been prepared under the direction and in the presence of Santiago Hernandez MD.     All medical record entries made by the scribe were personally dictated by me I have reviewed the chart and agree the record accurately reflects my personal performance of his history physical examination and management

## 2024-04-28 ENCOUNTER — NURSING HOME VISIT (OUTPATIENT)
Dept: POST ACUTE CARE | Facility: EXTERNAL LOCATION | Age: 74
End: 2024-04-28
Payer: MEDICARE

## 2024-04-28 DIAGNOSIS — I48.91 ATRIAL FIBRILLATION, UNSPECIFIED TYPE (MULTI): ICD-10-CM

## 2024-04-28 DIAGNOSIS — Z91.81 AT RISK FOR FALLS: ICD-10-CM

## 2024-04-28 DIAGNOSIS — R41.89 COGNITIVE DECLINE: ICD-10-CM

## 2024-04-28 DIAGNOSIS — F32.A DEPRESSION, UNSPECIFIED DEPRESSION TYPE: ICD-10-CM

## 2024-04-28 DIAGNOSIS — K21.9 GASTROESOPHAGEAL REFLUX DISEASE WITHOUT ESOPHAGITIS: ICD-10-CM

## 2024-04-28 DIAGNOSIS — J44.9 CHRONIC OBSTRUCTIVE PULMONARY DISEASE, UNSPECIFIED COPD TYPE (MULTI): ICD-10-CM

## 2024-04-28 DIAGNOSIS — R53.1 WEAKNESS: ICD-10-CM

## 2024-04-28 DIAGNOSIS — I50.9 CONGESTIVE HEART FAILURE, UNSPECIFIED HF CHRONICITY, UNSPECIFIED HEART FAILURE TYPE (MULTI): Primary | ICD-10-CM

## 2024-04-28 DIAGNOSIS — E11.9 DIABETES MELLITUS WITHOUT COMPLICATION (MULTI): ICD-10-CM

## 2024-04-28 DIAGNOSIS — I10 BENIGN HYPERTENSION: ICD-10-CM

## 2024-04-28 PROCEDURE — 99308 SBSQ NF CARE LOW MDM 20: CPT | Performed by: INTERNAL MEDICINE

## 2024-04-28 NOTE — LETTER
Patient: Lucas Valderrama  : 1950    Encounter Date: 2024    PLACE OF SERVICE:  [facility]    This is a subsequent visit.    Subjective  Patient ID: Lucas Valderrama is a 74 y.o. male who presents for Follow-up.    Mr. Lucas Valderrama is a 74-year-old male with history of congestive heart failure and COPD.  He is diabetic and suffers from major depression.  He is unable to care for himself and requires supportive care.    Review of Systems   Constitutional:  Negative for chills and fever.   Cardiovascular:  Negative for chest pain.   All other systems reviewed and are negative.    Objective  /82   Pulse 78   Temp 36.7 °C (98 °F)   Resp 18     Physical Exam  Vitals reviewed.   Constitutional:       Comments: This is a well-developed, well-nourished male, lying in bed, appearing weak   HENT:      Right Ear: Tympanic membrane, ear canal and external ear normal.      Left Ear: Tympanic membrane, ear canal and external ear normal.   Eyes:      General: No scleral icterus.     Pupils: Pupils are equal, round, and reactive to light.   Neck:      Vascular: No carotid bruit.   Cardiovascular:      Heart sounds: Normal heart sounds, S1 normal and S2 normal. No murmur heard.     No friction rub.   Pulmonary:      Breath sounds: Decreased breath sounds (throughout) present.   Abdominal:      Palpations: There is no hepatomegaly, splenomegaly or mass.   Musculoskeletal:         General: No swelling or deformity. Normal range of motion.      Cervical back: Neck supple.      Right lower leg: Edema present.      Left lower leg: Edema present.   Lymphadenopathy:      Cervical: No cervical adenopathy.      Upper Body:      Right upper body: No axillary adenopathy.      Left upper body: No axillary adenopathy.      Lower Body: No right inguinal adenopathy. No left inguinal adenopathy.   Neurological:      Mental Status: He is oriented to person, place, and time. He is lethargic.      Cranial Nerves: Cranial  nerves 2-12 are intact. No cranial nerve deficit.      Sensory: No sensory deficit.      Motor: Motor function is intact. No weakness.      Gait: Gait is intact.      Deep Tendon Reflexes: Reflexes normal.   Psychiatric:         Mood and Affect: Mood normal. Mood is not anxious or depressed. Affect is not angry.         Behavior: Behavior is not agitated.         Thought Content: Thought content normal.         Judgment: Judgment normal.     LAB WORK:  Laboratory studies reviewed.    Assessment/Plan  Problem List Items Addressed This Visit             ICD-10-CM       Advance Directives and General Issues    At risk for falls Z91.81       Cardiac and Vasculature    Atrial fibrillation (Multi) I48.91    Benign hypertension I10    Congestive heart failure (Multi) - Primary I50.9       Endocrine/Metabolic    Diabetes mellitus without complication (Multi) E11.9       Gastrointestinal and Abdominal    Gastroesophageal reflux disease without esophagitis K21.9       Mental Health    Depression F32.A     Other Visit Diagnoses         Codes    Chronic obstructive pulmonary disease, unspecified COPD type (Multi)     J44.9    Weakness     R53.1    Cognitive decline     R41.89        1. Heart failure, on diuretic.  2. COPD, on bronchodilator therapy.  3. Diabetes, on insulin.  4. Hypertension, med controlled.  5. Atrial fibrillation, rate controlled.  6. Depression, on medication.  7. Weakness, on PT and OT.  8. Fall risk, fall precaution.  9. Cognitive decline, on supportive care.  10. Reflux disease, on PPI.    Scribe Attestation  By signing my name below, IMarion Scribe attest that this documentation has been prepared under the direction and in the presence of Santiago Hernandez MD.     All medical record entries made by the scribe were personally dictated by me I have reviewed the chart and agree the record accurately reflects my personal performance of his history physical examination and management      Electronically  Signed By: Santiago Hernandez MD   5/2/24  1:09 AM

## 2024-04-30 VITALS
SYSTOLIC BLOOD PRESSURE: 120 MMHG | HEART RATE: 78 BPM | TEMPERATURE: 98 F | DIASTOLIC BLOOD PRESSURE: 82 MMHG | RESPIRATION RATE: 18 BRPM

## 2024-04-30 ASSESSMENT — ENCOUNTER SYMPTOMS
CHILLS: 0
FEVER: 0

## 2024-04-30 NOTE — PROGRESS NOTES
PLACE OF SERVICE:  [facility]    This is a subsequent visit.    Subjective   Patient ID: Lucas Valderrama is a 74 y.o. male who presents for Follow-up.    Mr. Lucas Valderrama is a 74-year-old male with history of congestive heart failure and COPD.  He is diabetic and suffers from major depression.  He is unable to care for himself and requires supportive care.    Review of Systems   Constitutional:  Negative for chills and fever.   Cardiovascular:  Negative for chest pain.   All other systems reviewed and are negative.    Objective   /82   Pulse 78   Temp 36.7 °C (98 °F)   Resp 18     Physical Exam  Vitals reviewed.   Constitutional:       Comments: This is a well-developed, well-nourished male, lying in bed, appearing weak   HENT:      Right Ear: Tympanic membrane, ear canal and external ear normal.      Left Ear: Tympanic membrane, ear canal and external ear normal.   Eyes:      General: No scleral icterus.     Pupils: Pupils are equal, round, and reactive to light.   Neck:      Vascular: No carotid bruit.   Cardiovascular:      Heart sounds: Normal heart sounds, S1 normal and S2 normal. No murmur heard.     No friction rub.   Pulmonary:      Breath sounds: Decreased breath sounds (throughout) present.   Abdominal:      Palpations: There is no hepatomegaly, splenomegaly or mass.   Musculoskeletal:         General: No swelling or deformity. Normal range of motion.      Cervical back: Neck supple.      Right lower leg: Edema present.      Left lower leg: Edema present.   Lymphadenopathy:      Cervical: No cervical adenopathy.      Upper Body:      Right upper body: No axillary adenopathy.      Left upper body: No axillary adenopathy.      Lower Body: No right inguinal adenopathy. No left inguinal adenopathy.   Neurological:      Mental Status: He is oriented to person, place, and time. He is lethargic.      Cranial Nerves: Cranial nerves 2-12 are intact. No cranial nerve deficit.      Sensory: No  sensory deficit.      Motor: Motor function is intact. No weakness.      Gait: Gait is intact.      Deep Tendon Reflexes: Reflexes normal.   Psychiatric:         Mood and Affect: Mood normal. Mood is not anxious or depressed. Affect is not angry.         Behavior: Behavior is not agitated.         Thought Content: Thought content normal.         Judgment: Judgment normal.     LAB WORK:  Laboratory studies reviewed.    Assessment/Plan   Problem List Items Addressed This Visit             ICD-10-CM       Advance Directives and General Issues    At risk for falls Z91.81       Cardiac and Vasculature    Atrial fibrillation (Multi) I48.91    Benign hypertension I10    Congestive heart failure (Multi) - Primary I50.9       Endocrine/Metabolic    Diabetes mellitus without complication (Multi) E11.9       Gastrointestinal and Abdominal    Gastroesophageal reflux disease without esophagitis K21.9       Mental Health    Depression F32.A     Other Visit Diagnoses         Codes    Chronic obstructive pulmonary disease, unspecified COPD type (Multi)     J44.9    Weakness     R53.1    Cognitive decline     R41.89        1. Heart failure, on diuretic.  2. COPD, on bronchodilator therapy.  3. Diabetes, on insulin.  4. Hypertension, med controlled.  5. Atrial fibrillation, rate controlled.  6. Depression, on medication.  7. Weakness, on PT and OT.  8. Fall risk, fall precaution.  9. Cognitive decline, on supportive care.  10. Reflux disease, on PPI.    Scribe Attestation  By signing my name below, IMarion Scribe attest that this documentation has been prepared under the direction and in the presence of Santiago Hernandez MD.     All medical record entries made by the scribe were personally dictated by me I have reviewed the chart and agree the record accurately reflects my personal performance of his history physical examination and management

## 2024-05-04 ENCOUNTER — NURSING HOME VISIT (OUTPATIENT)
Dept: POST ACUTE CARE | Facility: EXTERNAL LOCATION | Age: 74
End: 2024-05-04
Payer: MEDICARE

## 2024-05-04 DIAGNOSIS — I10 BENIGN HYPERTENSION: ICD-10-CM

## 2024-05-04 DIAGNOSIS — Z91.81 AT RISK FOR FALLS: ICD-10-CM

## 2024-05-04 DIAGNOSIS — I48.91 ATRIAL FIBRILLATION, UNSPECIFIED TYPE (MULTI): ICD-10-CM

## 2024-05-04 DIAGNOSIS — E11.9 TYPE 2 DIABETES MELLITUS WITHOUT COMPLICATION, WITH LONG-TERM CURRENT USE OF INSULIN (MULTI): ICD-10-CM

## 2024-05-04 DIAGNOSIS — F32.A DEPRESSION, UNSPECIFIED DEPRESSION TYPE: ICD-10-CM

## 2024-05-04 DIAGNOSIS — R53.1 WEAKNESS: ICD-10-CM

## 2024-05-04 DIAGNOSIS — I50.9 CONGESTIVE HEART FAILURE, UNSPECIFIED HF CHRONICITY, UNSPECIFIED HEART FAILURE TYPE (MULTI): Primary | ICD-10-CM

## 2024-05-04 DIAGNOSIS — K21.9 GASTROESOPHAGEAL REFLUX DISEASE WITHOUT ESOPHAGITIS: ICD-10-CM

## 2024-05-04 DIAGNOSIS — Z79.4 TYPE 2 DIABETES MELLITUS WITHOUT COMPLICATION, WITH LONG-TERM CURRENT USE OF INSULIN (MULTI): ICD-10-CM

## 2024-05-04 DIAGNOSIS — R41.89 COGNITIVE DECLINE: ICD-10-CM

## 2024-05-04 DIAGNOSIS — J44.9 CHRONIC OBSTRUCTIVE PULMONARY DISEASE, UNSPECIFIED COPD TYPE (MULTI): ICD-10-CM

## 2024-05-04 PROCEDURE — 99309 SBSQ NF CARE MODERATE MDM 30: CPT | Performed by: INTERNAL MEDICINE

## 2024-05-04 NOTE — LETTER
Patient: Lucas Valderrama  : 1950    Encounter Date: 2024    PLACE OF SERVICE:  [facility]    This is a subsequent visit.    Subjective  Patient ID: Lucas Valderrama is a 74 y.o. male who presents for Follow-up.    Mr. Lucas Valderrama is a 74-year-old male with history of CHF and COPD.  He is diabetic and unable to care for himself.  He requires supportive care.    Review of Systems   Constitutional:  Negative for chills and fever.   Cardiovascular:  Negative for chest pain.   All other systems reviewed and are negative.    Objective  /82   Pulse 80   Temp 36.5 °C (97.7 °F)   Resp 18     Physical Exam  Vitals reviewed.   Constitutional:       Comments: This is a well-developed, well-nourished male, lying in bed, appearing weak and lethargic.   HENT:      Right Ear: Tympanic membrane, ear canal and external ear normal.      Left Ear: Tympanic membrane, ear canal and external ear normal.   Eyes:      General: No scleral icterus.     Pupils: Pupils are equal, round, and reactive to light.   Neck:      Vascular: No carotid bruit.   Cardiovascular:      Heart sounds: Normal heart sounds, S1 normal and S2 normal. No murmur heard.     No friction rub.   Pulmonary:      Effort: Pulmonary effort is normal.      Breath sounds: Decreased breath sounds (throughout.) present.   Abdominal:      Palpations: There is no hepatomegaly, splenomegaly or mass.   Musculoskeletal:         General: No swelling or deformity. Normal range of motion.      Cervical back: Neck supple.      Right lower leg: Edema present.      Left lower leg: Edema present.   Lymphadenopathy:      Cervical: No cervical adenopathy.      Upper Body:      Right upper body: No axillary adenopathy.      Left upper body: No axillary adenopathy.      Lower Body: No right inguinal adenopathy. No left inguinal adenopathy.   Neurological:      Mental Status: He is oriented to person, place, and time.      Cranial Nerves: Cranial nerves 2-12 are  intact. No cranial nerve deficit.      Sensory: No sensory deficit.      Motor: Motor function is intact. No weakness.      Gait: Gait is intact.      Deep Tendon Reflexes: Reflexes normal.   Psychiatric:         Mood and Affect: Mood normal. Mood is not anxious or depressed. Affect is not angry.         Behavior: Behavior is not agitated.         Thought Content: Thought content normal.         Judgment: Judgment normal.     LAB WORK:  Laboratory studies were reviewed.    Assessment/Plan  Problem List Items Addressed This Visit             ICD-10-CM       Advance Directives and General Issues    At risk for falls Z91.81       Cardiac and Vasculature    Atrial fibrillation (Multi) I48.91    Benign hypertension I10    Congestive heart failure (Multi) - Primary I50.9       Gastrointestinal and Abdominal    Gastroesophageal reflux disease without esophagitis K21.9       Mental Health    Depression F32.A     Other Visit Diagnoses         Codes    Chronic obstructive pulmonary disease, unspecified COPD type (Multi)     J44.9    Type 2 diabetes mellitus without complication, with long-term current use of insulin (Multi)     E11.9, Z79.4    Weakness     R53.1    Cognitive decline     R41.89        1. Heart failure, on diuretic.  2. COPD, on bronchodilator therapy.  3. Diabetes, on insulin.  4. Atrial fibrillation, rate controlled.  5. Depression, on medication.  6. Weakness, on PT/OT.  7. Fall risk, on fall precautions.  8. Congestive decline, on supportive care.  9. Reflux disease, on PPI.  10. Hypertension, medically controlled    Scribe Attestation  By signing my name below, ILauren Scribe attest that this documentation has been prepared under the direction and in the presence of Santiago Hernandez MD.       All medical record entries made by the scribe were personally dictated by me I have reviewed the chart and agree the record accurately reflects my personal performance of his history physical examination and  management      Electronically Signed By: Santiago Hernandez MD   5/26/24  3:21 PM

## 2024-05-19 ENCOUNTER — NURSING HOME VISIT (OUTPATIENT)
Dept: POST ACUTE CARE | Facility: EXTERNAL LOCATION | Age: 74
End: 2024-05-19
Payer: MEDICARE

## 2024-05-19 DIAGNOSIS — I10 BENIGN HYPERTENSION: ICD-10-CM

## 2024-05-19 DIAGNOSIS — J44.9 CHRONIC OBSTRUCTIVE PULMONARY DISEASE, UNSPECIFIED COPD TYPE (MULTI): ICD-10-CM

## 2024-05-19 DIAGNOSIS — I50.9 CONGESTIVE HEART FAILURE, UNSPECIFIED HF CHRONICITY, UNSPECIFIED HEART FAILURE TYPE (MULTI): Primary | ICD-10-CM

## 2024-05-19 DIAGNOSIS — K21.9 GASTROESOPHAGEAL REFLUX DISEASE WITHOUT ESOPHAGITIS: ICD-10-CM

## 2024-05-19 DIAGNOSIS — Z91.81 AT RISK FOR FALLS: ICD-10-CM

## 2024-05-19 DIAGNOSIS — R53.1 WEAKNESS: ICD-10-CM

## 2024-05-19 DIAGNOSIS — I48.91 ATRIAL FIBRILLATION, UNSPECIFIED TYPE (MULTI): ICD-10-CM

## 2024-05-19 DIAGNOSIS — R41.89 COGNITIVE DECLINE: ICD-10-CM

## 2024-05-19 DIAGNOSIS — F32.A DEPRESSION, UNSPECIFIED DEPRESSION TYPE: ICD-10-CM

## 2024-05-19 DIAGNOSIS — E11.9 TYPE 2 DIABETES MELLITUS WITHOUT COMPLICATION, WITH LONG-TERM CURRENT USE OF INSULIN (MULTI): ICD-10-CM

## 2024-05-19 DIAGNOSIS — Z79.4 TYPE 2 DIABETES MELLITUS WITHOUT COMPLICATION, WITH LONG-TERM CURRENT USE OF INSULIN (MULTI): ICD-10-CM

## 2024-05-19 PROCEDURE — 99309 SBSQ NF CARE MODERATE MDM 30: CPT | Performed by: INTERNAL MEDICINE

## 2024-05-19 NOTE — LETTER
Patient: Lucas Valderrama  : 1950    Encounter Date: 2024    PLACE OF SERVICE:  [facility].    This is a subsequent visit.    Subjective  Patient ID: Lucas Valderrama is a 74 y.o. male who presents for Follow-up.    Mr. Lucas Valderrama is a 74-year-old male with history of heart failure with COPD and diabetes.  He is noted to have cognitive decline.  He has a difficult time ambulating.  He requires supportive care.    Review of Systems   Constitutional:  Negative for chills and fever.   Cardiovascular:  Negative for chest pain.   All other systems reviewed and are negative.    Objective  /76   Pulse 76   Temp 36.4 °C (97.5 °F)   Resp 18     Physical Exam  Vitals reviewed.   Constitutional:       Comments: This is a well-developed, well-nourished male, lying in bed, appearing weak.   HENT:      Right Ear: Tympanic membrane, ear canal and external ear normal.      Left Ear: Tympanic membrane, ear canal and external ear normal.   Eyes:      General: No scleral icterus.     Pupils: Pupils are equal, round, and reactive to light.   Neck:      Vascular: No carotid bruit.   Cardiovascular:      Heart sounds: Normal heart sounds, S1 normal and S2 normal. No murmur heard.     No friction rub.   Pulmonary:      Effort: Pulmonary effort is normal.      Breath sounds: Decreased breath sounds (throughout) present.   Abdominal:      Palpations: There is no hepatomegaly, splenomegaly or mass.   Musculoskeletal:         General: No swelling or deformity. Normal range of motion.      Cervical back: Neck supple.      Right lower leg: Edema present.      Left lower leg: Edema present.   Lymphadenopathy:      Cervical: No cervical adenopathy.      Upper Body:      Right upper body: No axillary adenopathy.      Left upper body: No axillary adenopathy.      Lower Body: No right inguinal adenopathy. No left inguinal adenopathy.   Neurological:      Mental Status: He is oriented to person, place, and time. He is  lethargic.      Cranial Nerves: Cranial nerves 2-12 are intact. No cranial nerve deficit.      Sensory: No sensory deficit.      Motor: Motor function is intact. No weakness.      Gait: Gait is intact.      Deep Tendon Reflexes: Reflexes normal.   Psychiatric:         Mood and Affect: Mood normal. Mood is not anxious or depressed. Affect is not angry.         Behavior: Behavior is not agitated.         Thought Content: Thought content normal.         Judgment: Judgment normal.     LAB WORK:  Laboratory studies reviewed.    Assessment/Plan  Problem List Items Addressed This Visit             ICD-10-CM       Advance Directives and General Issues    At risk for falls Z91.81       Cardiac and Vasculature    Atrial fibrillation (Multi) I48.91    Benign hypertension I10    Congestive heart failure (Multi) - Primary I50.9       Gastrointestinal and Abdominal    Gastroesophageal reflux disease without esophagitis K21.9       Mental Health    Depression F32.A     Other Visit Diagnoses         Codes    Chronic obstructive pulmonary disease, unspecified COPD type (Multi)     J44.9    Type 2 diabetes mellitus without complication, with long-term current use of insulin (Multi)     E11.9, Z79.4    Cognitive decline     R41.89    Weakness     R53.1        1. Heart failure, on diuretic.  2. COPD, on bronchodilator therapy.  3. Atrial fibrillation, rate controlled.  4. Diabetes, on insulin.  5. Congestive decline, on supportive care.  6. Weakness, on PT/OT.  7. Fall risk, on fall precautions.  8. Depression, on medication.  9. Hypertension, med controlled.  10. Reflux disease, on PPI.    Scribe Attestation  By signing my name below, I, Keshav Simmons attest that this documentation has been prepared under the direction and in the presence of Santiago Hernandez MD.     All medical record entries made by the scribe were personally dictated by me I have reviewed the chart and agree the record accurately reflects my personal performance  of his history physical examination and management      Electronically Signed By: Santiago Hernandez MD   5/26/24  2:23 PM

## 2024-05-20 VITALS
DIASTOLIC BLOOD PRESSURE: 82 MMHG | RESPIRATION RATE: 18 BRPM | SYSTOLIC BLOOD PRESSURE: 120 MMHG | HEART RATE: 80 BPM | TEMPERATURE: 97.7 F

## 2024-05-20 ASSESSMENT — ENCOUNTER SYMPTOMS
CHILLS: 0
FEVER: 0

## 2024-05-20 NOTE — PROGRESS NOTES
PLACE OF SERVICE:  [facility]    This is a subsequent visit.    Subjective   Patient ID: Lucas Valderrama is a 74 y.o. male who presents for Follow-up.    Mr. Lucas Valderrama is a 74-year-old male with history of CHF and COPD.  He is diabetic and unable to care for himself.  He requires supportive care.    Review of Systems   Constitutional:  Negative for chills and fever.   Cardiovascular:  Negative for chest pain.   All other systems reviewed and are negative.    Objective   /82   Pulse 80   Temp 36.5 °C (97.7 °F)   Resp 18     Physical Exam  Vitals reviewed.   Constitutional:       Comments: This is a well-developed, well-nourished male, lying in bed, appearing weak and lethargic.   HENT:      Right Ear: Tympanic membrane, ear canal and external ear normal.      Left Ear: Tympanic membrane, ear canal and external ear normal.   Eyes:      General: No scleral icterus.     Pupils: Pupils are equal, round, and reactive to light.   Neck:      Vascular: No carotid bruit.   Cardiovascular:      Heart sounds: Normal heart sounds, S1 normal and S2 normal. No murmur heard.     No friction rub.   Pulmonary:      Effort: Pulmonary effort is normal.      Breath sounds: Decreased breath sounds (throughout.) present.   Abdominal:      Palpations: There is no hepatomegaly, splenomegaly or mass.   Musculoskeletal:         General: No swelling or deformity. Normal range of motion.      Cervical back: Neck supple.      Right lower leg: Edema present.      Left lower leg: Edema present.   Lymphadenopathy:      Cervical: No cervical adenopathy.      Upper Body:      Right upper body: No axillary adenopathy.      Left upper body: No axillary adenopathy.      Lower Body: No right inguinal adenopathy. No left inguinal adenopathy.   Neurological:      Mental Status: He is oriented to person, place, and time.      Cranial Nerves: Cranial nerves 2-12 are intact. No cranial nerve deficit.      Sensory: No sensory deficit.       Motor: Motor function is intact. No weakness.      Gait: Gait is intact.      Deep Tendon Reflexes: Reflexes normal.   Psychiatric:         Mood and Affect: Mood normal. Mood is not anxious or depressed. Affect is not angry.         Behavior: Behavior is not agitated.         Thought Content: Thought content normal.         Judgment: Judgment normal.     LAB WORK:  Laboratory studies were reviewed.    Assessment/Plan   Problem List Items Addressed This Visit             ICD-10-CM       Advance Directives and General Issues    At risk for falls Z91.81       Cardiac and Vasculature    Atrial fibrillation (Multi) I48.91    Benign hypertension I10    Congestive heart failure (Multi) - Primary I50.9       Gastrointestinal and Abdominal    Gastroesophageal reflux disease without esophagitis K21.9       Mental Health    Depression F32.A     Other Visit Diagnoses         Codes    Chronic obstructive pulmonary disease, unspecified COPD type (Multi)     J44.9    Type 2 diabetes mellitus without complication, with long-term current use of insulin (Multi)     E11.9, Z79.4    Weakness     R53.1    Cognitive decline     R41.89        1. Heart failure, on diuretic.  2. COPD, on bronchodilator therapy.  3. Diabetes, on insulin.  4. Atrial fibrillation, rate controlled.  5. Depression, on medication.  6. Weakness, on PT/OT.  7. Fall risk, on fall precautions.  8. Congestive decline, on supportive care.  9. Reflux disease, on PPI.  10. Hypertension, medically controlled    Scribe Attestation  By signing my name below, ILauren Scribe attest that this documentation has been prepared under the direction and in the presence of Santiago Hernandez MD.       All medical record entries made by the scribe were personally dictated by me I have reviewed the chart and agree the record accurately reflects my personal performance of his history physical examination and management

## 2024-05-23 VITALS
TEMPERATURE: 97.5 F | SYSTOLIC BLOOD PRESSURE: 126 MMHG | HEART RATE: 76 BPM | DIASTOLIC BLOOD PRESSURE: 76 MMHG | RESPIRATION RATE: 18 BRPM

## 2024-05-23 ASSESSMENT — ENCOUNTER SYMPTOMS
FEVER: 0
CHILLS: 0

## 2024-05-23 NOTE — PROGRESS NOTES
PLACE OF SERVICE:  [facility].    This is a subsequent visit.    Subjective   Patient ID: Lucas Valderrama is a 74 y.o. male who presents for Follow-up.    Mr. Lucas Valderrama is a 74-year-old male with history of heart failure with COPD and diabetes.  He is noted to have cognitive decline.  He has a difficult time ambulating.  He requires supportive care.    Review of Systems   Constitutional:  Negative for chills and fever.   Cardiovascular:  Negative for chest pain.   All other systems reviewed and are negative.    Objective   /76   Pulse 76   Temp 36.4 °C (97.5 °F)   Resp 18     Physical Exam  Vitals reviewed.   Constitutional:       Comments: This is a well-developed, well-nourished male, lying in bed, appearing weak.   HENT:      Right Ear: Tympanic membrane, ear canal and external ear normal.      Left Ear: Tympanic membrane, ear canal and external ear normal.   Eyes:      General: No scleral icterus.     Pupils: Pupils are equal, round, and reactive to light.   Neck:      Vascular: No carotid bruit.   Cardiovascular:      Heart sounds: Normal heart sounds, S1 normal and S2 normal. No murmur heard.     No friction rub.   Pulmonary:      Effort: Pulmonary effort is normal.      Breath sounds: Decreased breath sounds (throughout) present.   Abdominal:      Palpations: There is no hepatomegaly, splenomegaly or mass.   Musculoskeletal:         General: No swelling or deformity. Normal range of motion.      Cervical back: Neck supple.      Right lower leg: Edema present.      Left lower leg: Edema present.   Lymphadenopathy:      Cervical: No cervical adenopathy.      Upper Body:      Right upper body: No axillary adenopathy.      Left upper body: No axillary adenopathy.      Lower Body: No right inguinal adenopathy. No left inguinal adenopathy.   Neurological:      Mental Status: He is oriented to person, place, and time. He is lethargic.      Cranial Nerves: Cranial nerves 2-12 are intact. No  cranial nerve deficit.      Sensory: No sensory deficit.      Motor: Motor function is intact. No weakness.      Gait: Gait is intact.      Deep Tendon Reflexes: Reflexes normal.   Psychiatric:         Mood and Affect: Mood normal. Mood is not anxious or depressed. Affect is not angry.         Behavior: Behavior is not agitated.         Thought Content: Thought content normal.         Judgment: Judgment normal.     LAB WORK:  Laboratory studies reviewed.    Assessment/Plan   Problem List Items Addressed This Visit             ICD-10-CM       Advance Directives and General Issues    At risk for falls Z91.81       Cardiac and Vasculature    Atrial fibrillation (Multi) I48.91    Benign hypertension I10    Congestive heart failure (Multi) - Primary I50.9       Gastrointestinal and Abdominal    Gastroesophageal reflux disease without esophagitis K21.9       Mental Health    Depression F32.A     Other Visit Diagnoses         Codes    Chronic obstructive pulmonary disease, unspecified COPD type (Multi)     J44.9    Type 2 diabetes mellitus without complication, with long-term current use of insulin (Multi)     E11.9, Z79.4    Cognitive decline     R41.89    Weakness     R53.1        1. Heart failure, on diuretic.  2. COPD, on bronchodilator therapy.  3. Atrial fibrillation, rate controlled.  4. Diabetes, on insulin.  5. Congestive decline, on supportive care.  6. Weakness, on PT/OT.  7. Fall risk, on fall precautions.  8. Depression, on medication.  9. Hypertension, med controlled.  10. Reflux disease, on PPI.    Scribe Attestation  By signing my name below, I, Keshav Simmons attest that this documentation has been prepared under the direction and in the presence of Santiago Hernandez MD.     All medical record entries made by the scribe were personally dictated by me I have reviewed the chart and agree the record accurately reflects my personal performance of his history physical examination and management

## 2024-05-25 ENCOUNTER — NURSING HOME VISIT (OUTPATIENT)
Dept: POST ACUTE CARE | Facility: EXTERNAL LOCATION | Age: 74
End: 2024-05-25
Payer: MEDICARE

## 2024-05-25 DIAGNOSIS — E11.9 TYPE 2 DIABETES MELLITUS WITHOUT COMPLICATION, WITH LONG-TERM CURRENT USE OF INSULIN (MULTI): ICD-10-CM

## 2024-05-25 DIAGNOSIS — Z79.4 TYPE 2 DIABETES MELLITUS WITHOUT COMPLICATION, WITH LONG-TERM CURRENT USE OF INSULIN (MULTI): ICD-10-CM

## 2024-05-25 DIAGNOSIS — R53.1 WEAKNESS: ICD-10-CM

## 2024-05-25 DIAGNOSIS — I26.99 PULMONARY EMBOLISM, OTHER, UNSPECIFIED CHRONICITY, UNSPECIFIED WHETHER ACUTE COR PULMONALE PRESENT (MULTI): ICD-10-CM

## 2024-05-25 DIAGNOSIS — Z91.81 AT RISK FOR FALLS: ICD-10-CM

## 2024-05-25 DIAGNOSIS — I48.91 ATRIAL FIBRILLATION, UNSPECIFIED TYPE (MULTI): ICD-10-CM

## 2024-05-25 DIAGNOSIS — R41.89 COGNITIVE DECLINE: ICD-10-CM

## 2024-05-25 DIAGNOSIS — J44.9 CHRONIC OBSTRUCTIVE PULMONARY DISEASE, UNSPECIFIED COPD TYPE (MULTI): ICD-10-CM

## 2024-05-25 DIAGNOSIS — E66.01 OBESITY, MORBID (MULTI): ICD-10-CM

## 2024-05-25 DIAGNOSIS — I50.9 CONGESTIVE HEART FAILURE, UNSPECIFIED HF CHRONICITY, UNSPECIFIED HEART FAILURE TYPE (MULTI): Primary | ICD-10-CM

## 2024-05-25 DIAGNOSIS — F32.A DEPRESSION, UNSPECIFIED DEPRESSION TYPE: ICD-10-CM

## 2024-05-25 DIAGNOSIS — K21.9 GASTROESOPHAGEAL REFLUX DISEASE WITHOUT ESOPHAGITIS: ICD-10-CM

## 2024-05-25 PROCEDURE — 99308 SBSQ NF CARE LOW MDM 20: CPT | Performed by: INTERNAL MEDICINE

## 2024-05-25 NOTE — LETTER
Patient: Lucas Valderrama  : 1950    Encounter Date: 2024    PLACE OF SERVICE:  [facility].    This is a subsequent visit.    Subjective  Patient ID: Lucas Valderrama is a 74 y.o. male who presents for Follow-up.    Mr. Lucas Valderrama is a 74-year-old male with a history of heart failure with COPD.  He is a diabetic.  He is unable to care for himself and requires supportive care.    Review of Systems   Constitutional:  Negative for chills and fever.   Cardiovascular:  Negative for chest pain.   All other systems reviewed and are negative.    Objective  /82   Pulse 82   Temp 36.6 °C (97.8 °F)   Resp 18     Physical Exam  Vitals reviewed.   Constitutional:       Comments: This is a well-developed, well-nourished male, lying in bed, appears awake.   HENT:      Right Ear: Tympanic membrane, ear canal and external ear normal.      Left Ear: Tympanic membrane, ear canal and external ear normal.   Eyes:      General: No scleral icterus.     Pupils: Pupils are equal, round, and reactive to light.   Neck:      Vascular: No carotid bruit.   Cardiovascular:      Heart sounds: Normal heart sounds, S1 normal and S2 normal. No murmur heard.     No friction rub.   Pulmonary:      Effort: Pulmonary effort is normal.      Breath sounds: Decreased breath sounds (throughout) present.   Abdominal:      Palpations: There is no hepatomegaly, splenomegaly or mass.   Musculoskeletal:         General: No swelling or deformity. Normal range of motion.      Cervical back: Neck supple.      Right lower leg: Edema present.      Left lower leg: Edema present.   Lymphadenopathy:      Cervical: No cervical adenopathy.      Upper Body:      Right upper body: No axillary adenopathy.      Left upper body: No axillary adenopathy.      Lower Body: No right inguinal adenopathy. No left inguinal adenopathy.   Neurological:      Mental Status: He is oriented to person, place, and time.      Cranial Nerves: Cranial nerves 2-12  are intact. No cranial nerve deficit.      Sensory: No sensory deficit.      Motor: Motor function is intact. No weakness.      Gait: Gait is intact.      Deep Tendon Reflexes: Reflexes normal.   Psychiatric:         Mood and Affect: Mood normal. Mood is not anxious or depressed. Affect is not angry.         Behavior: Behavior is not agitated.         Thought Content: Thought content normal.         Judgment: Judgment normal.     LAB WORK: Laboratory studies reviewed.    Assessment/Plan  Problem List Items Addressed This Visit             ICD-10-CM       Advance Directives and General Issues    At risk for falls Z91.81       Cardiac and Vasculature    Atrial fibrillation (Multi) I48.91    Congestive heart failure (Multi) - Primary I50.9       Gastrointestinal and Abdominal    Gastroesophageal reflux disease without esophagitis K21.9       Mental Health    Depression F32.A     Other Visit Diagnoses         Codes    Chronic obstructive pulmonary disease, unspecified COPD type (Multi)     J44.9    Type 2 diabetes mellitus without complication, with long-term current use of insulin (Multi)     E11.9, Z79.4    Weakness     R53.1    Cognitive decline     R41.89        1. Heart failure, on diuretics.  2. COPD, on bronchodilator therapy.  3. Diabetes, on insulin.  4. Atrial fibrillation, rate controlled.  5. Depression, on medication.  6. Weakness, on PT/OT.  7. Fall risk, on fall precautions.  8. Cognitive decline, on supportive care.  9. Reflux disease, on PPI.    Scribe Attestation  By signing my name below, ILauren Scribe attest that this documentation has been prepared under the direction and in the presence of Santiago Hernandez MD.     All medical record entries made by the scribe were personally dictated by me I have reviewed the chart and agree the record accurately reflects my personal performance of his history physical examination and management      Electronically Signed By: Santiago Hernandez MD   6/13/24  12:07 AM   no

## 2024-06-01 ENCOUNTER — NURSING HOME VISIT (OUTPATIENT)
Dept: POST ACUTE CARE | Facility: EXTERNAL LOCATION | Age: 74
End: 2024-06-01
Payer: MEDICARE

## 2024-06-01 DIAGNOSIS — K21.9 GASTROESOPHAGEAL REFLUX DISEASE WITHOUT ESOPHAGITIS: ICD-10-CM

## 2024-06-01 DIAGNOSIS — I10 BENIGN HYPERTENSION: ICD-10-CM

## 2024-06-01 DIAGNOSIS — F32.A DEPRESSION, UNSPECIFIED DEPRESSION TYPE: ICD-10-CM

## 2024-06-01 DIAGNOSIS — I26.99 PULMONARY EMBOLISM, OTHER, UNSPECIFIED CHRONICITY, UNSPECIFIED WHETHER ACUTE COR PULMONALE PRESENT (MULTI): ICD-10-CM

## 2024-06-01 DIAGNOSIS — E66.01 OBESITY, MORBID (MULTI): ICD-10-CM

## 2024-06-01 DIAGNOSIS — J44.9 CHRONIC OBSTRUCTIVE PULMONARY DISEASE, UNSPECIFIED COPD TYPE (MULTI): ICD-10-CM

## 2024-06-01 DIAGNOSIS — I50.9 CONGESTIVE HEART FAILURE, UNSPECIFIED HF CHRONICITY, UNSPECIFIED HEART FAILURE TYPE (MULTI): Primary | ICD-10-CM

## 2024-06-01 DIAGNOSIS — I48.91 ATRIAL FIBRILLATION, UNSPECIFIED TYPE (MULTI): ICD-10-CM

## 2024-06-01 DIAGNOSIS — E11.9 TYPE 2 DIABETES MELLITUS WITHOUT COMPLICATION, WITH LONG-TERM CURRENT USE OF INSULIN (MULTI): ICD-10-CM

## 2024-06-01 DIAGNOSIS — Z91.81 AT RISK FOR FALLS: ICD-10-CM

## 2024-06-01 DIAGNOSIS — R53.1 WEAKNESS: ICD-10-CM

## 2024-06-01 DIAGNOSIS — Z79.4 TYPE 2 DIABETES MELLITUS WITHOUT COMPLICATION, WITH LONG-TERM CURRENT USE OF INSULIN (MULTI): ICD-10-CM

## 2024-06-01 PROCEDURE — 99308 SBSQ NF CARE LOW MDM 20: CPT | Performed by: INTERNAL MEDICINE

## 2024-06-01 NOTE — LETTER
Patient: Lucas Valderrama  : 1950    Encounter Date: 2024    PLACE OF SERVICE:  [facility].    This is a subsequent visit.    Subjective  Patient ID: Lucas Valderrama is a 74 y.o. male who presents for Follow-up.    Mr. Lucas Valderrama is a 74-year-old male who suffers from COPD and congestive heart failure.  He has weakness and deconditioning and unable to care for himself.  He requires supportive care.    Review of Systems   Constitutional:  Negative for chills and fever.   Cardiovascular:  Negative for chest pain.   All other systems reviewed and are negative.    Objective  /78   Pulse 76   Temp 36.4 °C (97.6 °F)   Resp 18     Physical Exam  Vitals reviewed.   Constitutional:       Comments: This is a well-developed, well-nourished male, lying in bed, appearing awake.   HENT:      Right Ear: Tympanic membrane, ear canal and external ear normal.      Left Ear: Tympanic membrane, ear canal and external ear normal.   Eyes:      General: No scleral icterus.     Pupils: Pupils are equal, round, and reactive to light.   Neck:      Vascular: No carotid bruit.   Cardiovascular:      Heart sounds: Normal heart sounds, S1 normal and S2 normal. No murmur heard.     No friction rub.   Pulmonary:      Effort: Pulmonary effort is normal.      Breath sounds: Decreased breath sounds (throughout) present.   Abdominal:      Palpations: There is no hepatomegaly, splenomegaly or mass.   Musculoskeletal:         General: No swelling or deformity. Normal range of motion.      Cervical back: Neck supple.      Right lower leg: Edema present.      Left lower leg: Edema present.   Lymphadenopathy:      Cervical: No cervical adenopathy.      Upper Body:      Right upper body: No axillary adenopathy.      Left upper body: No axillary adenopathy.      Lower Body: No right inguinal adenopathy. No left inguinal adenopathy.   Neurological:      Mental Status: He is oriented to person, place, and time. He is lethargic.       Cranial Nerves: Cranial nerves 2-12 are intact. No cranial nerve deficit.      Sensory: No sensory deficit.      Motor: Motor function is intact. No weakness.      Gait: Gait is intact.      Deep Tendon Reflexes: Reflexes normal.   Psychiatric:         Mood and Affect: Mood normal. Mood is not anxious or depressed. Affect is not angry.         Behavior: Behavior is not agitated.         Thought Content: Thought content normal.         Judgment: Judgment normal.     LAB WORK:  Laboratory studies reviewed.    Assessment/Plan  Problem List Items Addressed This Visit             ICD-10-CM       Advance Directives and General Issues    At risk for falls Z91.81       Cardiac and Vasculature    Atrial fibrillation (Multi) I48.91    Benign hypertension I10    Congestive heart failure (Multi) - Primary I50.9       Gastrointestinal and Abdominal    Gastroesophageal reflux disease without esophagitis K21.9       Mental Health    Depression F32.A     Other Visit Diagnoses         Codes    Chronic obstructive pulmonary disease, unspecified COPD type (Multi)     J44.9    Type 2 diabetes mellitus without complication, with long-term current use of insulin (Multi)     E11.9, Z79.4    Weakness     R53.1        1. Heart failure, on diuretics.  2. COPD, on bronchodilator therapy.  3. Diabetes, on insulin.  4. Hypertension, medically controlled.  5. Depression, on medication.  6. Atrial fibrillation, rate controlled.  7. Weakness, on PT/OT.  8. Fall risk, on fall precaution.  9. Reflux disease, on PPI.    Scribe Attestation  By signing my name below, ILauren Scribe attest that this documentation has been prepared under the direction and in the presence of Santiago Hernandez MD.       All medical record entries made by the scribe were personally dictated by me I have reviewed the chart and agree the record accurately reflects my personal performance of his history physical examination and management      Electronically Signed  By: Santiago Hernandez MD   6/13/24 12:04 AM

## 2024-06-08 ENCOUNTER — NURSING HOME VISIT (OUTPATIENT)
Dept: POST ACUTE CARE | Facility: EXTERNAL LOCATION | Age: 74
End: 2024-06-08
Payer: MEDICARE

## 2024-06-08 DIAGNOSIS — I50.9 CONGESTIVE HEART FAILURE, UNSPECIFIED HF CHRONICITY, UNSPECIFIED HEART FAILURE TYPE (MULTI): Primary | ICD-10-CM

## 2024-06-08 DIAGNOSIS — I48.91 ATRIAL FIBRILLATION, UNSPECIFIED TYPE (MULTI): ICD-10-CM

## 2024-06-08 DIAGNOSIS — K21.9 GASTROESOPHAGEAL REFLUX DISEASE WITHOUT ESOPHAGITIS: ICD-10-CM

## 2024-06-08 DIAGNOSIS — Z91.81 AT RISK FOR FALLS: ICD-10-CM

## 2024-06-08 DIAGNOSIS — R41.89 COGNITIVE DECLINE: ICD-10-CM

## 2024-06-08 DIAGNOSIS — Z79.4 TYPE 2 DIABETES MELLITUS WITHOUT COMPLICATION, WITH LONG-TERM CURRENT USE OF INSULIN (MULTI): ICD-10-CM

## 2024-06-08 DIAGNOSIS — F32.A DEPRESSION, UNSPECIFIED DEPRESSION TYPE: ICD-10-CM

## 2024-06-08 DIAGNOSIS — E11.9 TYPE 2 DIABETES MELLITUS WITHOUT COMPLICATION, WITH LONG-TERM CURRENT USE OF INSULIN (MULTI): ICD-10-CM

## 2024-06-08 DIAGNOSIS — J44.9 CHRONIC OBSTRUCTIVE PULMONARY DISEASE, UNSPECIFIED COPD TYPE (MULTI): ICD-10-CM

## 2024-06-08 DIAGNOSIS — R53.1 WEAKNESS: ICD-10-CM

## 2024-06-08 PROCEDURE — 99308 SBSQ NF CARE LOW MDM 20: CPT | Performed by: INTERNAL MEDICINE

## 2024-06-08 NOTE — LETTER
Patient: Lucas Valderrama  : 1950    Encounter Date: 2024    PLACE OF SERVICE:  [facility].    This is a subsequent visit.    Subjective  Patient ID: Lucas Valderrama is a 74 y.o. male who presents for Follow-up.    Mr. Lucas Valderrama is a 74-year-old male with history of CHF and COPD.  He is a diabetic and suffers from cognitive decline.  He is unable to care for himself and requires supportive care.    Review of Systems   Constitutional:  Negative for chills and fever.   Cardiovascular:  Negative for chest pain.   All other systems reviewed and are negative.    Objective  /82   Pulse 74   Temp 36.5 °C (97.7 °F)   Resp 18     Physical Exam  Vitals reviewed.   Constitutional:       General: He is not in acute distress.     Comments: This is a well-developed, well-nourished male, sitting in a chair.   HENT:      Right Ear: Tympanic membrane, ear canal and external ear normal.      Left Ear: Tympanic membrane, ear canal and external ear normal.   Eyes:      General: No scleral icterus.     Pupils: Pupils are equal, round, and reactive to light.   Neck:      Vascular: No carotid bruit.   Cardiovascular:      Heart sounds: Normal heart sounds, S1 normal and S2 normal. No murmur heard.     No friction rub.   Pulmonary:      Effort: Pulmonary effort is normal.      Breath sounds: Decreased breath sounds (throughout) present.   Abdominal:      Palpations: There is no hepatomegaly, splenomegaly or mass.   Musculoskeletal:         General: No swelling or deformity. Normal range of motion.      Cervical back: Neck supple.      Right lower leg: Edema present.      Left lower leg: Edema present.   Lymphadenopathy:      Cervical: No cervical adenopathy.      Upper Body:      Right upper body: No axillary adenopathy.      Left upper body: No axillary adenopathy.      Lower Body: No right inguinal adenopathy. No left inguinal adenopathy.   Neurological:      Mental Status: He is oriented to person,  place, and time.      Cranial Nerves: Cranial nerves 2-12 are intact. No cranial nerve deficit.      Sensory: No sensory deficit.      Motor: Motor function is intact. No weakness.      Gait: Gait is intact.      Deep Tendon Reflexes: Reflexes normal.   Psychiatric:         Mood and Affect: Mood normal. Mood is not anxious or depressed. Affect is not angry.         Behavior: Behavior is not agitated.         Thought Content: Thought content normal.         Judgment: Judgment normal.     LAB WORK: Laboratory studies were reviewed.    Assessment/Plan  Problem List Items Addressed This Visit             ICD-10-CM       Advance Directives and General Issues    At risk for falls Z91.81       Cardiac and Vasculature    Atrial fibrillation (Multi) I48.91    Congestive heart failure (Multi) - Primary I50.9       Gastrointestinal and Abdominal    Gastroesophageal reflux disease without esophagitis K21.9       Mental Health    Depression F32.A     Other Visit Diagnoses         Codes    Chronic obstructive pulmonary disease, unspecified COPD type (Multi)     J44.9    Type 2 diabetes mellitus without complication, with long-term current use of insulin (Multi)     E11.9, Z79.4    Weakness     R53.1    Cognitive decline     R41.89        1. Congestive heart failure, on diuretic.  2. COPD, on bronchodilator therapy.  3. Diabetes, on insulin.  4. Atrial fibrillation, on rate control.  5. Depression, on medication.  6. Weakness, on PT/OT.  7. Fall risk, fall precautions.  8. Reflux disease, on PPI.  9. Cognitive decline, on supportive care.    Scribe Attestation  By signing my name below, ILauren Scribe attest that this documentation has been prepared under the direction and in the presence of Santiago Hernandez MD.     All medical record entries made by the scribe were personally dictated by me I have reviewed the chart and agree the record accurately reflects my personal performance of his history physical examination and  management      Electronically Signed By: Santiago Hernandez MD   6/15/24 10:04 PM

## 2024-06-10 VITALS
HEART RATE: 82 BPM | SYSTOLIC BLOOD PRESSURE: 128 MMHG | DIASTOLIC BLOOD PRESSURE: 82 MMHG | RESPIRATION RATE: 18 BRPM | TEMPERATURE: 97.8 F

## 2024-06-10 VITALS
TEMPERATURE: 97.6 F | DIASTOLIC BLOOD PRESSURE: 78 MMHG | SYSTOLIC BLOOD PRESSURE: 126 MMHG | RESPIRATION RATE: 18 BRPM | HEART RATE: 76 BPM

## 2024-06-10 ASSESSMENT — ENCOUNTER SYMPTOMS
FEVER: 0
FEVER: 0
CHILLS: 0
CHILLS: 0

## 2024-06-10 NOTE — PROGRESS NOTES
PLACE OF SERVICE:  [facility].    This is a subsequent visit.    Subjective   Patient ID: Lucas Valderrama is a 74 y.o. male who presents for Follow-up.    Mr. Lucas Valderrama is a 74-year-old male with a history of heart failure with COPD.  He is a diabetic.  He is unable to care for himself and requires supportive care.    Review of Systems   Constitutional:  Negative for chills and fever.   Cardiovascular:  Negative for chest pain.   All other systems reviewed and are negative.    Objective   /82   Pulse 82   Temp 36.6 °C (97.8 °F)   Resp 18     Physical Exam  Vitals reviewed.   Constitutional:       Comments: This is a well-developed, well-nourished male, lying in bed, appears awake.   HENT:      Right Ear: Tympanic membrane, ear canal and external ear normal.      Left Ear: Tympanic membrane, ear canal and external ear normal.   Eyes:      General: No scleral icterus.     Pupils: Pupils are equal, round, and reactive to light.   Neck:      Vascular: No carotid bruit.   Cardiovascular:      Heart sounds: Normal heart sounds, S1 normal and S2 normal. No murmur heard.     No friction rub.   Pulmonary:      Effort: Pulmonary effort is normal.      Breath sounds: Decreased breath sounds (throughout) present.   Abdominal:      Palpations: There is no hepatomegaly, splenomegaly or mass.   Musculoskeletal:         General: No swelling or deformity. Normal range of motion.      Cervical back: Neck supple.      Right lower leg: Edema present.      Left lower leg: Edema present.   Lymphadenopathy:      Cervical: No cervical adenopathy.      Upper Body:      Right upper body: No axillary adenopathy.      Left upper body: No axillary adenopathy.      Lower Body: No right inguinal adenopathy. No left inguinal adenopathy.   Neurological:      Mental Status: He is oriented to person, place, and time.      Cranial Nerves: Cranial nerves 2-12 are intact. No cranial nerve deficit.      Sensory: No sensory deficit.       Motor: Motor function is intact. No weakness.      Gait: Gait is intact.      Deep Tendon Reflexes: Reflexes normal.   Psychiatric:         Mood and Affect: Mood normal. Mood is not anxious or depressed. Affect is not angry.         Behavior: Behavior is not agitated.         Thought Content: Thought content normal.         Judgment: Judgment normal.     LAB WORK: Laboratory studies reviewed.    Assessment/Plan   Problem List Items Addressed This Visit             ICD-10-CM       Advance Directives and General Issues    At risk for falls Z91.81       Cardiac and Vasculature    Atrial fibrillation (Multi) I48.91    Congestive heart failure (Multi) - Primary I50.9       Gastrointestinal and Abdominal    Gastroesophageal reflux disease without esophagitis K21.9       Mental Health    Depression F32.A     Other Visit Diagnoses         Codes    Chronic obstructive pulmonary disease, unspecified COPD type (Multi)     J44.9    Type 2 diabetes mellitus without complication, with long-term current use of insulin (Multi)     E11.9, Z79.4    Weakness     R53.1    Cognitive decline     R41.89        1. Heart failure, on diuretics.  2. COPD, on bronchodilator therapy.  3. Diabetes, on insulin.  4. Atrial fibrillation, rate controlled.  5. Depression, on medication.  6. Weakness, on PT/OT.  7. Fall risk, on fall precautions.  8. Cognitive decline, on supportive care.  9. Reflux disease, on PPI.    Scribe Attestation  By signing my name below, I, Keshav Simmons attest that this documentation has been prepared under the direction and in the presence of Santiago Hernandez MD.     All medical record entries made by the scribe were personally dictated by me I have reviewed the chart and agree the record accurately reflects my personal performance of his history physical examination and management

## 2024-06-13 PROBLEM — E66.01 OBESITY, MORBID (MULTI): Status: ACTIVE | Noted: 2024-06-13

## 2024-06-15 VITALS
SYSTOLIC BLOOD PRESSURE: 126 MMHG | TEMPERATURE: 97.7 F | RESPIRATION RATE: 18 BRPM | HEART RATE: 74 BPM | DIASTOLIC BLOOD PRESSURE: 82 MMHG

## 2024-06-15 ASSESSMENT — ENCOUNTER SYMPTOMS
FEVER: 0
CHILLS: 0

## 2024-06-15 NOTE — PROGRESS NOTES
PLACE OF SERVICE:  [facility].    This is a subsequent visit.    Subjective   Patient ID: Lucas Valderrama is a 74 y.o. male who presents for Follow-up.    Mr. Lucas Valderrama is a 74-year-old male with history of CHF and COPD.  He is a diabetic and suffers from cognitive decline.  He is unable to care for himself and requires supportive care.    Review of Systems   Constitutional:  Negative for chills and fever.   Cardiovascular:  Negative for chest pain.   All other systems reviewed and are negative.    Objective   /82   Pulse 74   Temp 36.5 °C (97.7 °F)   Resp 18     Physical Exam  Vitals reviewed.   Constitutional:       General: He is not in acute distress.     Comments: This is a well-developed, well-nourished male, sitting in a chair.   HENT:      Right Ear: Tympanic membrane, ear canal and external ear normal.      Left Ear: Tympanic membrane, ear canal and external ear normal.   Eyes:      General: No scleral icterus.     Pupils: Pupils are equal, round, and reactive to light.   Neck:      Vascular: No carotid bruit.   Cardiovascular:      Heart sounds: Normal heart sounds, S1 normal and S2 normal. No murmur heard.     No friction rub.   Pulmonary:      Effort: Pulmonary effort is normal.      Breath sounds: Decreased breath sounds (throughout) present.   Abdominal:      Palpations: There is no hepatomegaly, splenomegaly or mass.   Musculoskeletal:         General: No swelling or deformity. Normal range of motion.      Cervical back: Neck supple.      Right lower leg: Edema present.      Left lower leg: Edema present.   Lymphadenopathy:      Cervical: No cervical adenopathy.      Upper Body:      Right upper body: No axillary adenopathy.      Left upper body: No axillary adenopathy.      Lower Body: No right inguinal adenopathy. No left inguinal adenopathy.   Neurological:      Mental Status: He is oriented to person, place, and time.      Cranial Nerves: Cranial nerves 2-12 are intact. No  cranial nerve deficit.      Sensory: No sensory deficit.      Motor: Motor function is intact. No weakness.      Gait: Gait is intact.      Deep Tendon Reflexes: Reflexes normal.   Psychiatric:         Mood and Affect: Mood normal. Mood is not anxious or depressed. Affect is not angry.         Behavior: Behavior is not agitated.         Thought Content: Thought content normal.         Judgment: Judgment normal.     LAB WORK: Laboratory studies were reviewed.    Assessment/Plan   Problem List Items Addressed This Visit             ICD-10-CM       Advance Directives and General Issues    At risk for falls Z91.81       Cardiac and Vasculature    Atrial fibrillation (Multi) I48.91    Congestive heart failure (Multi) - Primary I50.9       Gastrointestinal and Abdominal    Gastroesophageal reflux disease without esophagitis K21.9       Mental Health    Depression F32.A     Other Visit Diagnoses         Codes    Chronic obstructive pulmonary disease, unspecified COPD type (Multi)     J44.9    Type 2 diabetes mellitus without complication, with long-term current use of insulin (Multi)     E11.9, Z79.4    Weakness     R53.1    Cognitive decline     R41.89        1. Congestive heart failure, on diuretic.  2. COPD, on bronchodilator therapy.  3. Diabetes, on insulin.  4. Atrial fibrillation, on rate control.  5. Depression, on medication.  6. Weakness, on PT/OT.  7. Fall risk, fall precautions.  8. Reflux disease, on PPI.  9. Cognitive decline, on supportive care.    Scribe Attestation  By signing my name below, ILauren Scribe attest that this documentation has been prepared under the direction and in the presence of Santiago Hernandez MD.     All medical record entries made by the scribe were personally dictated by me I have reviewed the chart and agree the record accurately reflects my personal performance of his history physical examination and management

## 2024-06-16 ENCOUNTER — NURSING HOME VISIT (OUTPATIENT)
Dept: POST ACUTE CARE | Facility: EXTERNAL LOCATION | Age: 74
End: 2024-06-16
Payer: MEDICARE

## 2024-06-16 DIAGNOSIS — I50.9 CONGESTIVE HEART FAILURE, UNSPECIFIED HF CHRONICITY, UNSPECIFIED HEART FAILURE TYPE (MULTI): Primary | ICD-10-CM

## 2024-06-16 DIAGNOSIS — R41.89 COGNITIVE DECLINE: ICD-10-CM

## 2024-06-16 DIAGNOSIS — F32.A DEPRESSION, UNSPECIFIED DEPRESSION TYPE: ICD-10-CM

## 2024-06-16 DIAGNOSIS — K21.9 GASTROESOPHAGEAL REFLUX DISEASE WITHOUT ESOPHAGITIS: ICD-10-CM

## 2024-06-16 DIAGNOSIS — I48.91 ATRIAL FIBRILLATION, UNSPECIFIED TYPE (MULTI): ICD-10-CM

## 2024-06-16 DIAGNOSIS — Z91.81 AT RISK FOR FALLS: ICD-10-CM

## 2024-06-16 DIAGNOSIS — I10 BENIGN HYPERTENSION: ICD-10-CM

## 2024-06-16 DIAGNOSIS — Z79.4 TYPE 2 DIABETES MELLITUS WITHOUT COMPLICATION, WITH LONG-TERM CURRENT USE OF INSULIN (MULTI): ICD-10-CM

## 2024-06-16 DIAGNOSIS — R53.1 WEAKNESS: ICD-10-CM

## 2024-06-16 DIAGNOSIS — E11.9 TYPE 2 DIABETES MELLITUS WITHOUT COMPLICATION, WITH LONG-TERM CURRENT USE OF INSULIN (MULTI): ICD-10-CM

## 2024-06-16 DIAGNOSIS — J44.9 CHRONIC OBSTRUCTIVE PULMONARY DISEASE, UNSPECIFIED COPD TYPE (MULTI): ICD-10-CM

## 2024-06-16 PROCEDURE — 99309 SBSQ NF CARE MODERATE MDM 30: CPT | Performed by: INTERNAL MEDICINE

## 2024-06-16 NOTE — LETTER
Patient: Lucas Valderrama  : 1950    Encounter Date: 2024    PLACE OF SERVICE:  [facility], [place], operated by [facility].    This is a subsequent visit.    Subjective  Patient ID: Lucas Valderrama is a 74 y.o. male who presents for Follow-up.    Mr. Lucas Valderrama is a 74-year-old male with history of congestive heart failure.  He is a diabetic and has a history of COPD.  He is unable to care for himself and requires supportive care.    Review of Systems   Constitutional:  Negative for chills and fever.   Cardiovascular:  Negative for chest pain.   All other systems reviewed and are negative.    Objective  /82   Pulse 78   Temp 36.7 °C (98 °F)   Resp 18     Physical Exam  Vitals reviewed.   Constitutional:       Comments: This is a well-developed, well-nourished male, lying in bed, appearing weak.   HENT:      Right Ear: Tympanic membrane, ear canal and external ear normal.      Left Ear: Tympanic membrane, ear canal and external ear normal.   Eyes:      General: No scleral icterus.     Pupils: Pupils are equal, round, and reactive to light.   Neck:      Vascular: No carotid bruit.   Cardiovascular:      Heart sounds: Normal heart sounds, S1 normal and S2 normal. No murmur heard.     No friction rub.   Pulmonary:      Effort: Pulmonary effort is normal.      Breath sounds: Decreased breath sounds (throughout) present.   Abdominal:      Palpations: There is no hepatomegaly, splenomegaly or mass.   Musculoskeletal:         General: No swelling or deformity. Normal range of motion.      Cervical back: Neck supple.      Right lower leg: Edema present.      Left lower leg: Edema present.   Lymphadenopathy:      Cervical: No cervical adenopathy.      Upper Body:      Right upper body: No axillary adenopathy.      Left upper body: No axillary adenopathy.      Lower Body: No right inguinal adenopathy. No left inguinal adenopathy.   Neurological:      Mental Status: He is oriented to person, place, and time. He is lethargic.       Cranial Nerves: Cranial nerves 2-12 are intact. No cranial nerve deficit.      Sensory: No sensory deficit.      Motor: Motor function is intact. No weakness.      Gait: Gait is intact.      Deep Tendon Reflexes: Reflexes normal.   Psychiatric:         Mood and Affect: Mood normal. Mood is not anxious or depressed. Affect is not angry.         Behavior: Behavior is not agitated.         Thought Content: Thought content normal.         Judgment: Judgment normal.     LAB WORK:  Laboratory studies reviewed.    Assessment/Plan  Problem List Items Addressed This Visit             ICD-10-CM       Advance Directives and General Issues    At risk for falls Z91.81       Cardiac and Vasculature    Atrial fibrillation (Multi) I48.91    Benign hypertension I10    Congestive heart failure (Multi) - Primary I50.9       Gastrointestinal and Abdominal    Gastroesophageal reflux disease without esophagitis K21.9       Mental Health    Depression F32.A     Other Visit Diagnoses         Codes    Chronic obstructive pulmonary disease, unspecified COPD type (Multi)     J44.9    Type 2 diabetes mellitus without complication, with long-term current use of insulin (Multi)     E11.9, Z79.4    Weakness     R53.1    Cognitive decline     R41.89        1. Heart failure, on diuretic.  2. Atrial fibrillation, on rate controlled.  3. COPD, on bronchodilator therapy.  4. Diabetes, on insulin.  5. Depression, on medication.  6. Weakness, on PT/OT.  7. Fall risk, fall precautions.  8. Hypertension, med controlled.  9. Reflux disease, on PPI.  10. Cognitive decline, on supportive care.    Scribe Attestation  By signing my name below, ILauren Scribe attest that this documentation has been prepared under the direction and in the presence of Santiago Hernandez MD.     All medical record entries made by the scribe were personally dictated by me I have reviewed the chart and agree the record accurately reflects my personal performance of his  history physical examination and management      Electronically Signed By: Santiago Hernandez MD   6/19/24  9:09 PM

## 2024-06-17 VITALS
HEART RATE: 78 BPM | TEMPERATURE: 98 F | DIASTOLIC BLOOD PRESSURE: 82 MMHG | RESPIRATION RATE: 18 BRPM | SYSTOLIC BLOOD PRESSURE: 130 MMHG

## 2024-06-17 ASSESSMENT — ENCOUNTER SYMPTOMS
FEVER: 0
CHILLS: 0

## 2024-06-17 NOTE — PROGRESS NOTES
PLACE OF SERVICE:  [facility], operated by [facility].    This is a subsequent visit.    Subjective   Patient ID: Lucas Valderrama is a 74 y.o. male who presents for Follow-up.    Mr. Lucas Valderrama is a 74-year-old male with history of congestive heart failure.  He is a diabetic and has a history of COPD.  He is unable to care for himself and requires supportive care.    Review of Systems   Constitutional:  Negative for chills and fever.   Cardiovascular:  Negative for chest pain.   All other systems reviewed and are negative.    Objective   /82   Pulse 78   Temp 36.7 °C (98 °F)   Resp 18     Physical Exam  Vitals reviewed.   Constitutional:       Comments: This is a well-developed, well-nourished male, lying in bed, appearing weak.   HENT:      Right Ear: Tympanic membrane, ear canal and external ear normal.      Left Ear: Tympanic membrane, ear canal and external ear normal.   Eyes:      General: No scleral icterus.     Pupils: Pupils are equal, round, and reactive to light.   Neck:      Vascular: No carotid bruit.   Cardiovascular:      Heart sounds: Normal heart sounds, S1 normal and S2 normal. No murmur heard.     No friction rub.   Pulmonary:      Effort: Pulmonary effort is normal.      Breath sounds: Decreased breath sounds (throughout) present.   Abdominal:      Palpations: There is no hepatomegaly, splenomegaly or mass.   Musculoskeletal:         General: No swelling or deformity. Normal range of motion.      Cervical back: Neck supple.      Right lower leg: Edema present.      Left lower leg: Edema present.   Lymphadenopathy:      Cervical: No cervical adenopathy.      Upper Body:      Right upper body: No axillary adenopathy.      Left upper body: No axillary adenopathy.      Lower Body: No right inguinal adenopathy. No left inguinal adenopathy.   Neurological:      Mental Status: He is oriented to person, place, and time. He is lethargic.      Cranial Nerves: Cranial nerves 2-12 are intact. No cranial nerve  deficit.      Sensory: No sensory deficit.      Motor: Motor function is intact. No weakness.      Gait: Gait is intact.      Deep Tendon Reflexes: Reflexes normal.   Psychiatric:         Mood and Affect: Mood normal. Mood is not anxious or depressed. Affect is not angry.         Behavior: Behavior is not agitated.         Thought Content: Thought content normal.         Judgment: Judgment normal.     LAB WORK:  Laboratory studies reviewed.    Assessment/Plan   Problem List Items Addressed This Visit             ICD-10-CM       Advance Directives and General Issues    At risk for falls Z91.81       Cardiac and Vasculature    Atrial fibrillation (Multi) I48.91    Benign hypertension I10    Congestive heart failure (Multi) - Primary I50.9       Gastrointestinal and Abdominal    Gastroesophageal reflux disease without esophagitis K21.9       Mental Health    Depression F32.A     Other Visit Diagnoses         Codes    Chronic obstructive pulmonary disease, unspecified COPD type (Multi)     J44.9    Type 2 diabetes mellitus without complication, with long-term current use of insulin (Multi)     E11.9, Z79.4    Weakness     R53.1    Cognitive decline     R41.89        1. Heart failure, on diuretic.  2. Atrial fibrillation, on rate controlled.  3. COPD, on bronchodilator therapy.  4. Diabetes, on insulin.  5. Depression, on medication.  6. Weakness, on PT/OT.  7. Fall risk, fall precautions.  8. Hypertension, med controlled.  9. Reflux disease, on PPI.  10. Cognitive decline, on supportive care.    Scribe Attestation  By signing my name below, ILauren Scribe attest that this documentation has been prepared under the direction and in the presence of Santiago Hernandez MD.     All medical record entries made by the scribe were personally dictated by me I have reviewed the chart and agree the record accurately reflects my personal performance of his history physical examination and management

## 2024-06-22 ENCOUNTER — NURSING HOME VISIT (OUTPATIENT)
Dept: POST ACUTE CARE | Facility: EXTERNAL LOCATION | Age: 74
End: 2024-06-22
Payer: MEDICARE

## 2024-06-22 DIAGNOSIS — F32.A DEPRESSION, UNSPECIFIED DEPRESSION TYPE: ICD-10-CM

## 2024-06-22 DIAGNOSIS — R41.89 COGNITIVE DECLINE: ICD-10-CM

## 2024-06-22 DIAGNOSIS — G62.9 NEUROPATHY: ICD-10-CM

## 2024-06-22 DIAGNOSIS — Z79.4 TYPE 2 DIABETES MELLITUS WITHOUT COMPLICATION, WITH LONG-TERM CURRENT USE OF INSULIN (MULTI): ICD-10-CM

## 2024-06-22 DIAGNOSIS — Z91.81 AT RISK FOR FALLS: ICD-10-CM

## 2024-06-22 DIAGNOSIS — J44.9 CHRONIC OBSTRUCTIVE PULMONARY DISEASE, UNSPECIFIED COPD TYPE (MULTI): ICD-10-CM

## 2024-06-22 DIAGNOSIS — I48.91 ATRIAL FIBRILLATION, UNSPECIFIED TYPE (MULTI): ICD-10-CM

## 2024-06-22 DIAGNOSIS — I10 BENIGN HYPERTENSION: ICD-10-CM

## 2024-06-22 DIAGNOSIS — I50.9 CONGESTIVE HEART FAILURE, UNSPECIFIED HF CHRONICITY, UNSPECIFIED HEART FAILURE TYPE (MULTI): Primary | ICD-10-CM

## 2024-06-22 DIAGNOSIS — E11.9 TYPE 2 DIABETES MELLITUS WITHOUT COMPLICATION, WITH LONG-TERM CURRENT USE OF INSULIN (MULTI): ICD-10-CM

## 2024-06-22 DIAGNOSIS — K21.9 GASTROESOPHAGEAL REFLUX DISEASE WITHOUT ESOPHAGITIS: ICD-10-CM

## 2024-06-22 DIAGNOSIS — R53.1 WEAKNESS: ICD-10-CM

## 2024-06-22 PROCEDURE — 99309 SBSQ NF CARE MODERATE MDM 30: CPT | Performed by: INTERNAL MEDICINE

## 2024-06-22 NOTE — LETTER
Patient: Lucas Valderrama  : 1950    Encounter Date: 2024    PLACE OF SERVICE:  [facility]    This is a subsequent visit.    Subjective  Patient ID: Lucas Valderrama is a 74 y.o. male who presents for Follow-up.    Mr. Lucas Valderrama is a diabetic male with history of COPD and congestive heart failure.  He is unable to care for himself and manage his affairs.  He requires supportive care.    Review of Systems   Constitutional:  Negative for chills and fever.   Cardiovascular:  Negative for chest pain.   All other systems reviewed and are negative.    Objective  /78   Pulse 76   Temp 36.7 °C (98 °F)   Resp 18     Physical Exam  Vitals reviewed.   Constitutional:       Comments: This is a well-developed, well-nourished male, lying in bed, appearing weak   HENT:      Right Ear: Tympanic membrane, ear canal and external ear normal.      Left Ear: Tympanic membrane, ear canal and external ear normal.   Eyes:      General: No scleral icterus.     Pupils: Pupils are equal, round, and reactive to light.   Neck:      Vascular: No carotid bruit.   Cardiovascular:      Heart sounds: Normal heart sounds, S1 normal and S2 normal. No murmur heard.     No friction rub.   Pulmonary:      Breath sounds: Decreased breath sounds (throughout) present.   Abdominal:      Palpations: There is no hepatomegaly, splenomegaly or mass.   Musculoskeletal:         General: No swelling or deformity. Normal range of motion.      Cervical back: Neck supple.      Right lower leg: Edema present.      Left lower leg: Edema present.   Lymphadenopathy:      Cervical: No cervical adenopathy.      Upper Body:      Right upper body: No axillary adenopathy.      Left upper body: No axillary adenopathy.      Lower Body: No right inguinal adenopathy. No left inguinal adenopathy.   Neurological:      Mental Status: He is oriented to person, place, and time. He is lethargic.      Cranial Nerves: Cranial nerves 2-12 are intact. No  cranial nerve deficit.      Sensory: No sensory deficit.      Motor: Motor function is intact. No weakness.      Gait: Gait is intact.      Deep Tendon Reflexes: Reflexes normal.   Psychiatric:         Mood and Affect: Mood normal. Mood is not anxious or depressed. Affect is not angry.         Behavior: Behavior is not agitated.         Thought Content: Thought content normal.         Judgment: Judgment normal.     LAB WORK:  Laboratory studies were reviewed.    Assessment/Plan  Problem List Items Addressed This Visit             ICD-10-CM       Advance Directives and General Issues    At risk for falls Z91.81       Cardiac and Vasculature    Atrial fibrillation (Multi) I48.91    Benign hypertension I10    Congestive heart failure (Multi) - Primary I50.9       Gastrointestinal and Abdominal    Gastroesophageal reflux disease without esophagitis K21.9       Mental Health    Depression F32.A       Neuro    Neuropathy G62.9     Other Visit Diagnoses         Codes    Chronic obstructive pulmonary disease, unspecified COPD type (Multi)     J44.9    Type 2 diabetes mellitus without complication, with long-term current use of insulin (Multi)     E11.9, Z79.4    Cognitive decline     R41.89    Weakness     R53.1        1. Heart failure, on diuretics.  2. COPD, on bronchodilator therapy.  3. Diabetes, on insulin.  4. Neuropathy, on gabapentin.  5. Atrial fibrillation, rate controlled.  6. Depression, on medication.  7. Reflux disease, on PPI.  8. Cognitive decline, on supportive care.  9. Hypertension, medically controlled.  10. Weakness, on PT/OT.  11. Fall risk, on fall precautions.    Scribe Attestation  By signing my name below, IMarion Scribe attest that this documentation has been prepared under the direction and in the presence of Santiago Hernandez MD.     All medical record entries made by the scribe were personally dictated by me I have reviewed the chart and agree the record accurately reflects my personal  performance of his history physical examination and management      Electronically Signed By: Santiago Hernandez MD   6/25/24 10:34 PM

## 2024-06-24 VITALS
DIASTOLIC BLOOD PRESSURE: 78 MMHG | SYSTOLIC BLOOD PRESSURE: 130 MMHG | TEMPERATURE: 98 F | RESPIRATION RATE: 18 BRPM | HEART RATE: 76 BPM

## 2024-06-24 ASSESSMENT — ENCOUNTER SYMPTOMS
CHILLS: 0
FEVER: 0

## 2024-06-24 NOTE — PROGRESS NOTES
PLACE OF SERVICE:  [facility]    This is a subsequent visit.    Subjective   Patient ID: Lucas Valderrama is a 74 y.o. male who presents for Follow-up.    Mr. Lucas Valdrerama is a diabetic male with history of COPD and congestive heart failure.  He is unable to care for himself and manage his affairs.  He requires supportive care.    Review of Systems   Constitutional:  Negative for chills and fever.   Cardiovascular:  Negative for chest pain.   All other systems reviewed and are negative.    Objective   /78   Pulse 76   Temp 36.7 °C (98 °F)   Resp 18     Physical Exam  Vitals reviewed.   Constitutional:       Comments: This is a well-developed, well-nourished male, lying in bed, appearing weak   HENT:      Right Ear: Tympanic membrane, ear canal and external ear normal.      Left Ear: Tympanic membrane, ear canal and external ear normal.   Eyes:      General: No scleral icterus.     Pupils: Pupils are equal, round, and reactive to light.   Neck:      Vascular: No carotid bruit.   Cardiovascular:      Heart sounds: Normal heart sounds, S1 normal and S2 normal. No murmur heard.     No friction rub.   Pulmonary:      Breath sounds: Decreased breath sounds (throughout) present.   Abdominal:      Palpations: There is no hepatomegaly, splenomegaly or mass.   Musculoskeletal:         General: No swelling or deformity. Normal range of motion.      Cervical back: Neck supple.      Right lower leg: Edema present.      Left lower leg: Edema present.   Lymphadenopathy:      Cervical: No cervical adenopathy.      Upper Body:      Right upper body: No axillary adenopathy.      Left upper body: No axillary adenopathy.      Lower Body: No right inguinal adenopathy. No left inguinal adenopathy.   Neurological:      Mental Status: He is oriented to person, place, and time. He is lethargic.      Cranial Nerves: Cranial nerves 2-12 are intact. No cranial nerve deficit.      Sensory: No sensory deficit.      Motor:  Motor function is intact. No weakness.      Gait: Gait is intact.      Deep Tendon Reflexes: Reflexes normal.   Psychiatric:         Mood and Affect: Mood normal. Mood is not anxious or depressed. Affect is not angry.         Behavior: Behavior is not agitated.         Thought Content: Thought content normal.         Judgment: Judgment normal.     LAB WORK:  Laboratory studies were reviewed.    Assessment/Plan   Problem List Items Addressed This Visit             ICD-10-CM       Advance Directives and General Issues    At risk for falls Z91.81       Cardiac and Vasculature    Atrial fibrillation (Multi) I48.91    Benign hypertension I10    Congestive heart failure (Multi) - Primary I50.9       Gastrointestinal and Abdominal    Gastroesophageal reflux disease without esophagitis K21.9       Mental Health    Depression F32.A       Neuro    Neuropathy G62.9     Other Visit Diagnoses         Codes    Chronic obstructive pulmonary disease, unspecified COPD type (Multi)     J44.9    Type 2 diabetes mellitus without complication, with long-term current use of insulin (Multi)     E11.9, Z79.4    Cognitive decline     R41.89    Weakness     R53.1        1. Heart failure, on diuretics.  2. COPD, on bronchodilator therapy.  3. Diabetes, on insulin.  4. Neuropathy, on gabapentin.  5. Atrial fibrillation, rate controlled.  6. Depression, on medication.  7. Reflux disease, on PPI.  8. Cognitive decline, on supportive care.  9. Hypertension, medically controlled.  10. Weakness, on PT/OT.  11. Fall risk, on fall precautions.    Scribe Attestation  By signing my name below, IMarion Scribe attest that this documentation has been prepared under the direction and in the presence of Santiago Hernandez MD.     All medical record entries made by the scribe were personally dictated by me I have reviewed the chart and agree the record accurately reflects my personal performance of his history physical examination and management

## 2024-07-04 ENCOUNTER — NURSING HOME VISIT (OUTPATIENT)
Dept: POST ACUTE CARE | Facility: EXTERNAL LOCATION | Age: 74
End: 2024-07-04
Payer: MEDICARE

## 2024-07-04 DIAGNOSIS — K21.9 GASTROESOPHAGEAL REFLUX DISEASE WITHOUT ESOPHAGITIS: ICD-10-CM

## 2024-07-04 DIAGNOSIS — F32.A DEPRESSION, UNSPECIFIED DEPRESSION TYPE: ICD-10-CM

## 2024-07-04 DIAGNOSIS — I48.91 ATRIAL FIBRILLATION, UNSPECIFIED TYPE (MULTI): ICD-10-CM

## 2024-07-04 DIAGNOSIS — Z79.4 TYPE 2 DIABETES MELLITUS WITHOUT COMPLICATION, WITH LONG-TERM CURRENT USE OF INSULIN (MULTI): ICD-10-CM

## 2024-07-04 DIAGNOSIS — Z91.81 AT RISK FOR FALLS: ICD-10-CM

## 2024-07-04 DIAGNOSIS — E11.9 TYPE 2 DIABETES MELLITUS WITHOUT COMPLICATION, WITH LONG-TERM CURRENT USE OF INSULIN (MULTI): ICD-10-CM

## 2024-07-04 DIAGNOSIS — R53.1 WEAKNESS: ICD-10-CM

## 2024-07-04 DIAGNOSIS — I50.9 CONGESTIVE HEART FAILURE, UNSPECIFIED HF CHRONICITY, UNSPECIFIED HEART FAILURE TYPE (MULTI): Primary | ICD-10-CM

## 2024-07-04 DIAGNOSIS — J44.9 CHRONIC OBSTRUCTIVE PULMONARY DISEASE, UNSPECIFIED COPD TYPE (MULTI): ICD-10-CM

## 2024-07-04 DIAGNOSIS — I10 BENIGN HYPERTENSION: ICD-10-CM

## 2024-07-04 PROCEDURE — 99309 SBSQ NF CARE MODERATE MDM 30: CPT | Performed by: INTERNAL MEDICINE

## 2024-07-04 NOTE — LETTER
Patient: Lucas Valderrama  : 1950    Encounter Date: 2024    PLACE OF SERVICE:  [facility]    This is a subsequent visit.    Subjective  Patient ID: Lucas Valderrama is a 74 y.o. male who presents for Follow-up.    Mr. Lucas Valderrama is a 74-year-old male with history of COPD and congestive heart failure.  He suffers from cognitive decline.  He is unable to care for himself.  He requires supportive care.    Review of Systems   Constitutional:  Negative for chills and fever.   Cardiovascular:  Negative for chest pain.   All other systems reviewed and are negative.    Objective  /76   Pulse 72   Temp 36.5 °C (97.7 °F)   Resp 18     Physical Exam  Vitals reviewed.   Constitutional:       Comments: This is a well-developed, well-nourished male, lying in bed, appearing weak   HENT:      Right Ear: Tympanic membrane, ear canal and external ear normal.      Left Ear: Tympanic membrane, ear canal and external ear normal.   Eyes:      General: No scleral icterus.     Pupils: Pupils are equal, round, and reactive to light.   Neck:      Vascular: No carotid bruit.   Cardiovascular:      Heart sounds: Normal heart sounds, S1 normal and S2 normal. No murmur heard.     No friction rub.   Pulmonary:      Breath sounds: Decreased breath sounds (throughout) present.   Abdominal:      Palpations: There is no hepatomegaly, splenomegaly or mass.   Musculoskeletal:         General: No swelling or deformity. Normal range of motion.      Cervical back: Neck supple.      Right lower leg: Edema present.      Left lower leg: Edema present.   Lymphadenopathy:      Cervical: No cervical adenopathy.      Upper Body:      Right upper body: No axillary adenopathy.      Left upper body: No axillary adenopathy.      Lower Body: No right inguinal adenopathy. No left inguinal adenopathy.   Neurological:      Mental Status: He is oriented to person, place, and time. He is lethargic.      Cranial Nerves: Cranial nerves 2-12  are intact. No cranial nerve deficit.      Sensory: No sensory deficit.      Motor: Motor function is intact. No weakness.      Gait: Gait is intact.      Deep Tendon Reflexes: Reflexes normal.   Psychiatric:         Mood and Affect: Mood normal. Mood is not anxious or depressed. Affect is not angry.         Behavior: Behavior is not agitated.         Thought Content: Thought content normal.         Judgment: Judgment normal.     LAB WORK:  Laboratory studies were reviewed.    Assessment/Plan  Problem List Items Addressed This Visit             ICD-10-CM       Advance Directives and General Issues    At risk for falls Z91.81       Cardiac and Vasculature    Atrial fibrillation (Multi) I48.91    Benign hypertension I10    Congestive heart failure (Multi) - Primary I50.9       Gastrointestinal and Abdominal    Gastroesophageal reflux disease without esophagitis K21.9       Mental Health    Depression F32.A     Other Visit Diagnoses         Codes    Chronic obstructive pulmonary disease, unspecified COPD type (Multi)     J44.9    Type 2 diabetes mellitus without complication, with long-term current use of insulin (Multi)     E11.9, Z79.4    Weakness     R53.1        1. Congestive heart failure, on diuretic.  2. COPD, on bronchodilator therapy.  3. Diabetes, on insulin.  4. Depression, on medication.  5. Atrial fibrillation.  Rate controlled.  6. Weakness, on PT/OT.  7. Fall risk, on fall precautions.  8. Reflux disease, on PPI.  9. Hypertension, medically controlled.    Scribe Attestation  By signing my name below, IMarion Scribe attest that this documentation has been prepared under the direction and in the presence of Santiago Hernandez MD.     All medical record entries made by the scribe were personally dictated by me I have reviewed the chart and agree the record accurately reflects my personal performance of his history physical examination and management      Electronically Signed By: Santiago Hernandez MD    7/11/24  1:44 AM

## 2024-07-08 VITALS
TEMPERATURE: 97.7 F | DIASTOLIC BLOOD PRESSURE: 76 MMHG | RESPIRATION RATE: 18 BRPM | SYSTOLIC BLOOD PRESSURE: 124 MMHG | HEART RATE: 72 BPM

## 2024-07-08 ASSESSMENT — ENCOUNTER SYMPTOMS
FEVER: 0
CHILLS: 0

## 2024-07-08 NOTE — PROGRESS NOTES
PLACE OF SERVICE:  [facility], operated by [facility]    This is a subsequent visit.    Subjective   Patient ID: Lucas Valderrama is a 74 y.o. male who presents for Follow-up.    Mr. Lucas Valderrama is a 74-year-old male with history of COPD and congestive heart failure.  He suffers from cognitive decline.  He is unable to care for himself.  He requires supportive care.    Review of Systems   Constitutional:  Negative for chills and fever.   Cardiovascular:  Negative for chest pain.   All other systems reviewed and are negative.    Objective   /76   Pulse 72   Temp 36.5 °C (97.7 °F)   Resp 18     Physical Exam  Vitals reviewed.   Constitutional:       Comments: This is a well-developed, well-nourished male, lying in bed, appearing weak   HENT:      Right Ear: Tympanic membrane, ear canal and external ear normal.      Left Ear: Tympanic membrane, ear canal and external ear normal.   Eyes:      General: No scleral icterus.     Pupils: Pupils are equal, round, and reactive to light.   Neck:      Vascular: No carotid bruit.   Cardiovascular:      Heart sounds: Normal heart sounds, S1 normal and S2 normal. No murmur heard.     No friction rub.   Pulmonary:      Breath sounds: Decreased breath sounds (throughout) present.   Abdominal:      Palpations: There is no hepatomegaly, splenomegaly or mass.   Musculoskeletal:         General: No swelling or deformity. Normal range of motion.      Cervical back: Neck supple.      Right lower leg: Edema present.      Left lower leg: Edema present.   Lymphadenopathy:      Cervical: No cervical adenopathy.      Upper Body:      Right upper body: No axillary adenopathy.      Left upper body: No axillary adenopathy.      Lower Body: No right inguinal adenopathy. No left inguinal adenopathy.   Neurological:      Mental Status: He is oriented to person, place, and time. He is lethargic.      Cranial Nerves: Cranial nerves 2-12 are intact. No cranial nerve deficit.      Sensory: No sensory  deficit.      Motor: Motor function is intact. No weakness.      Gait: Gait is intact.      Deep Tendon Reflexes: Reflexes normal.   Psychiatric:         Mood and Affect: Mood normal. Mood is not anxious or depressed. Affect is not angry.         Behavior: Behavior is not agitated.         Thought Content: Thought content normal.         Judgment: Judgment normal.     LAB WORK:  Laboratory studies were reviewed.    Assessment/Plan   Problem List Items Addressed This Visit             ICD-10-CM       Advance Directives and General Issues    At risk for falls Z91.81       Cardiac and Vasculature    Atrial fibrillation (Multi) I48.91    Benign hypertension I10    Congestive heart failure (Multi) - Primary I50.9       Gastrointestinal and Abdominal    Gastroesophageal reflux disease without esophagitis K21.9       Mental Health    Depression F32.A     Other Visit Diagnoses         Codes    Chronic obstructive pulmonary disease, unspecified COPD type (Multi)     J44.9    Type 2 diabetes mellitus without complication, with long-term current use of insulin (Multi)     E11.9, Z79.4    Weakness     R53.1        1. Congestive heart failure, on diuretic.  2. COPD, on bronchodilator therapy.  3. Diabetes, on insulin.  4. Depression, on medication.  5. Atrial fibrillation.  Rate controlled.  6. Weakness, on PT/OT.  7. Fall risk, on fall precautions.  8. Reflux disease, on PPI.  9. Hypertension, medically controlled.    Scribe Attestation  By signing my name below, IMarion Scribe attest that this documentation has been prepared under the direction and in the presence of Santiago Hernandez MD.     All medical record entries made by the scribe were personally dictated by me I have reviewed the chart and agree the record accurately reflects my personal performance of his history physical examination and management     no

## 2024-08-03 ENCOUNTER — NURSING HOME VISIT (OUTPATIENT)
Dept: POST ACUTE CARE | Facility: EXTERNAL LOCATION | Age: 74
End: 2024-08-03
Payer: MEDICARE

## 2024-08-03 DIAGNOSIS — I48.91 ATRIAL FIBRILLATION, UNSPECIFIED TYPE (MULTI): ICD-10-CM

## 2024-08-03 DIAGNOSIS — J44.9 CHRONIC OBSTRUCTIVE PULMONARY DISEASE, UNSPECIFIED COPD TYPE (MULTI): Primary | ICD-10-CM

## 2024-08-03 DIAGNOSIS — K21.9 GASTROESOPHAGEAL REFLUX DISEASE WITHOUT ESOPHAGITIS: ICD-10-CM

## 2024-08-03 DIAGNOSIS — F32.A DEPRESSION, UNSPECIFIED DEPRESSION TYPE: ICD-10-CM

## 2024-08-03 DIAGNOSIS — R53.1 WEAKNESS: ICD-10-CM

## 2024-08-03 DIAGNOSIS — Z91.81 AT RISK FOR FALLS: ICD-10-CM

## 2024-08-03 DIAGNOSIS — I50.9 CONGESTIVE HEART FAILURE, UNSPECIFIED HF CHRONICITY, UNSPECIFIED HEART FAILURE TYPE (MULTI): ICD-10-CM

## 2024-08-03 DIAGNOSIS — I10 BENIGN HYPERTENSION: ICD-10-CM

## 2024-08-03 DIAGNOSIS — Z79.4 TYPE 2 DIABETES MELLITUS WITHOUT COMPLICATION, WITH LONG-TERM CURRENT USE OF INSULIN (MULTI): ICD-10-CM

## 2024-08-03 DIAGNOSIS — E11.9 TYPE 2 DIABETES MELLITUS WITHOUT COMPLICATION, WITH LONG-TERM CURRENT USE OF INSULIN (MULTI): ICD-10-CM

## 2024-08-03 PROCEDURE — 99309 SBSQ NF CARE MODERATE MDM 30: CPT | Performed by: INTERNAL MEDICINE

## 2024-08-03 NOTE — LETTER
Patient: Lucas Valderrama  : 1950    Encounter Date: 2024    PLACE OF SERVICE:  [facility].    This is a subsequent visit.    Subjective  Patient ID: Lucas Valderrama is a 74 y.o. male who presents for Follow-up.    Mr. Lucas Valderrama is a 74-year-old male with history of CHF and COPD.  He suffers from depression and is unable to care for himself.  He requires supportive care.    Review of Systems   Constitutional:  Negative for chills and fever.   Cardiovascular:  Negative for chest pain.   All other systems reviewed and are negative.    Objective  /82   Pulse 78   Temp 36.7 °C (98.1 °F)   Resp 18     Physical Exam  Vitals reviewed.   Constitutional:       Comments: This is a well-developed, well-nourished male, lying in bed, appearing weak.   HENT:      Right Ear: Tympanic membrane, ear canal and external ear normal.      Left Ear: Tympanic membrane, ear canal and external ear normal.   Eyes:      General: No scleral icterus.     Pupils: Pupils are equal, round, and reactive to light.   Neck:      Vascular: No carotid bruit.   Cardiovascular:      Heart sounds: Normal heart sounds, S1 normal and S2 normal. No murmur heard.     No friction rub.   Pulmonary:      Effort: Pulmonary effort is normal.      Breath sounds: Decreased breath sounds (throughout) present.   Abdominal:      Palpations: There is no hepatomegaly, splenomegaly or mass.   Musculoskeletal:         General: No swelling or deformity. Normal range of motion.      Cervical back: Neck supple.      Right lower leg: Edema (trace) present.      Left lower leg: Edema (trace) present.   Lymphadenopathy:      Cervical: No cervical adenopathy.      Upper Body:      Right upper body: No axillary adenopathy.      Left upper body: No axillary adenopathy.      Lower Body: No right inguinal adenopathy. No left inguinal adenopathy.   Neurological:      Mental Status: He is oriented to person, place, and time. He is lethargic.       Cranial Nerves: Cranial nerves 2-12 are intact. No cranial nerve deficit.      Sensory: No sensory deficit.      Motor: Motor function is intact. No weakness.      Gait: Gait is intact.      Deep Tendon Reflexes: Reflexes normal.   Psychiatric:         Mood and Affect: Mood normal. Mood is not anxious or depressed. Affect is not angry.         Behavior: Behavior is not agitated.         Thought Content: Thought content normal.         Judgment: Judgment normal.     LAB WORK:  Laboratory studies were reviewed.    Assessment/Plan  Problem List Items Addressed This Visit             ICD-10-CM       Advance Directives and General Issues    At risk for falls Z91.81       Cardiac and Vasculature    Atrial fibrillation (Multi) I48.91    Benign hypertension I10    Congestive heart failure (Multi) I50.9       Gastrointestinal and Abdominal    Gastroesophageal reflux disease without esophagitis K21.9       Mental Health    Depression F32.A     Other Visit Diagnoses         Codes    Chronic obstructive pulmonary disease, unspecified COPD type (Multi)    -  Primary J44.9    Type 2 diabetes mellitus without complication, with long-term current use of insulin (Multi)     E11.9, Z79.4    Weakness     R53.1        1. COPD, on bronchodilator therapy.  2. Heart failure, on diuretics.  3. Atrial fibrillation.  Rate controlled.  4. Diabetes, on insulin.  5. Hypertension, medically controlled.  6. Depression, on medication.  7. GERD, on PPI.  8. Weakness, on PT/OT.  9. Fall risk, on fall precautions.    Scribe Attestation  By signing my name below, ILauren Scribe attest that this documentation has been prepared under the direction and in the presence of Santiago Hernandez MD.     All medical record entries made by the scribe were personally dictated by me I have reviewed the chart and agree the record accurately reflects my personal performance of his history physical examination and management      Electronically Signed By:  Santiago Hernandez MD   8/6/24 12:42 AM

## 2024-08-05 VITALS
SYSTOLIC BLOOD PRESSURE: 130 MMHG | HEART RATE: 78 BPM | TEMPERATURE: 98.1 F | RESPIRATION RATE: 18 BRPM | DIASTOLIC BLOOD PRESSURE: 82 MMHG

## 2024-08-05 ASSESSMENT — ENCOUNTER SYMPTOMS
FEVER: 0
CHILLS: 0

## 2024-08-05 NOTE — PROGRESS NOTES
PLACE OF SERVICE:  [facility].    This is a subsequent visit.    Subjective   Patient ID: Lucas Valderrama is a 74 y.o. male who presents for Follow-up.    Mr. Lucas Valderrama is a 74-year-old male with history of CHF and COPD.  He suffers from depression and is unable to care for himself.  He requires supportive care.    Review of Systems   Constitutional:  Negative for chills and fever.   Cardiovascular:  Negative for chest pain.   All other systems reviewed and are negative.    Objective   /82   Pulse 78   Temp 36.7 °C (98.1 °F)   Resp 18     Physical Exam  Vitals reviewed.   Constitutional:       Comments: This is a well-developed, well-nourished male, lying in bed, appearing weak.   HENT:      Right Ear: Tympanic membrane, ear canal and external ear normal.      Left Ear: Tympanic membrane, ear canal and external ear normal.   Eyes:      General: No scleral icterus.     Pupils: Pupils are equal, round, and reactive to light.   Neck:      Vascular: No carotid bruit.   Cardiovascular:      Heart sounds: Normal heart sounds, S1 normal and S2 normal. No murmur heard.     No friction rub.   Pulmonary:      Effort: Pulmonary effort is normal.      Breath sounds: Decreased breath sounds (throughout) present.   Abdominal:      Palpations: There is no hepatomegaly, splenomegaly or mass.   Musculoskeletal:         General: No swelling or deformity. Normal range of motion.      Cervical back: Neck supple.      Right lower leg: Edema (trace) present.      Left lower leg: Edema (trace) present.   Lymphadenopathy:      Cervical: No cervical adenopathy.      Upper Body:      Right upper body: No axillary adenopathy.      Left upper body: No axillary adenopathy.      Lower Body: No right inguinal adenopathy. No left inguinal adenopathy.   Neurological:      Mental Status: He is oriented to person, place, and time. He is lethargic.      Cranial Nerves: Cranial nerves 2-12 are intact. No cranial nerve deficit.       Sensory: No sensory deficit.      Motor: Motor function is intact. No weakness.      Gait: Gait is intact.      Deep Tendon Reflexes: Reflexes normal.   Psychiatric:         Mood and Affect: Mood normal. Mood is not anxious or depressed. Affect is not angry.         Behavior: Behavior is not agitated.         Thought Content: Thought content normal.         Judgment: Judgment normal.     LAB WORK:  Laboratory studies were reviewed.    Assessment/Plan   Problem List Items Addressed This Visit             ICD-10-CM       Advance Directives and General Issues    At risk for falls Z91.81       Cardiac and Vasculature    Atrial fibrillation (Multi) I48.91    Benign hypertension I10    Congestive heart failure (Multi) I50.9       Gastrointestinal and Abdominal    Gastroesophageal reflux disease without esophagitis K21.9       Mental Health    Depression F32.A     Other Visit Diagnoses         Codes    Chronic obstructive pulmonary disease, unspecified COPD type (Multi)    -  Primary J44.9    Type 2 diabetes mellitus without complication, with long-term current use of insulin (Multi)     E11.9, Z79.4    Weakness     R53.1        1. COPD, on bronchodilator therapy.  2. Heart failure, on diuretics.  3. Atrial fibrillation.  Rate controlled.  4. Diabetes, on insulin.  5. Hypertension, medically controlled.  6. Depression, on medication.  7. GERD, on PPI.  8. Weakness, on PT/OT.  9. Fall risk, on fall precautions.    Scribe Attestation  By signing my name below, I, Keshav Simmons attest that this documentation has been prepared under the direction and in the presence of Santiago Hernandez MD.     All medical record entries made by the scribe were personally dictated by me I have reviewed the chart and agree the record accurately reflects my personal performance of his history physical examination and management

## 2024-08-10 ENCOUNTER — NURSING HOME VISIT (OUTPATIENT)
Dept: POST ACUTE CARE | Facility: EXTERNAL LOCATION | Age: 74
End: 2024-08-10
Payer: MEDICARE

## 2024-08-10 DIAGNOSIS — I50.9 CONGESTIVE HEART FAILURE, UNSPECIFIED HF CHRONICITY, UNSPECIFIED HEART FAILURE TYPE (MULTI): Primary | ICD-10-CM

## 2024-08-10 DIAGNOSIS — F41.9 ANXIETY: ICD-10-CM

## 2024-08-10 DIAGNOSIS — J44.9 CHRONIC OBSTRUCTIVE PULMONARY DISEASE, UNSPECIFIED COPD TYPE (MULTI): ICD-10-CM

## 2024-08-10 DIAGNOSIS — I10 BENIGN HYPERTENSION: ICD-10-CM

## 2024-08-10 DIAGNOSIS — Z91.81 AT RISK FOR FALLS: ICD-10-CM

## 2024-08-10 DIAGNOSIS — R53.1 WEAKNESS: ICD-10-CM

## 2024-08-10 DIAGNOSIS — I48.91 ATRIAL FIBRILLATION, UNSPECIFIED TYPE (MULTI): ICD-10-CM

## 2024-08-10 DIAGNOSIS — F32.A DEPRESSION, UNSPECIFIED DEPRESSION TYPE: ICD-10-CM

## 2024-08-10 DIAGNOSIS — Z79.4 TYPE 2 DIABETES MELLITUS WITHOUT COMPLICATION, WITH LONG-TERM CURRENT USE OF INSULIN (MULTI): ICD-10-CM

## 2024-08-10 DIAGNOSIS — E11.9 TYPE 2 DIABETES MELLITUS WITHOUT COMPLICATION, WITH LONG-TERM CURRENT USE OF INSULIN (MULTI): ICD-10-CM

## 2024-08-10 DIAGNOSIS — K21.9 GASTROESOPHAGEAL REFLUX DISEASE WITHOUT ESOPHAGITIS: ICD-10-CM

## 2024-08-10 PROCEDURE — 99309 SBSQ NF CARE MODERATE MDM 30: CPT | Performed by: INTERNAL MEDICINE

## 2024-08-10 NOTE — LETTER
Patient: Lucas Valderrama  : 1950    Encounter Date: 08/10/2024    PLACE OF SERVICE:  [facility].    This is a subsequent visit.    Subjective  Patient ID: Lucas Valderrama is a 74 y.o. male who presents for Follow-up.    Mr. Lucas Valderrama is a 74-year-old male with history of congestive heart failure with COPD.  He has multiple medical issues and is unable to care for himself.  He requires supportive care.    Review of Systems   Constitutional:  Negative for chills and fever.   Cardiovascular:  Negative for chest pain.   All other systems reviewed and are negative.    Objective  /82   Pulse 78   Temp 36.7 °C (98 °F)   Resp 18     Physical Exam  Vitals reviewed.   Constitutional:       Comments: This is a well-developed, well-nourished male, lying in bed, appearing weak.   HENT:      Right Ear: Tympanic membrane, ear canal and external ear normal.      Left Ear: Tympanic membrane, ear canal and external ear normal.   Eyes:      General: No scleral icterus.     Pupils: Pupils are equal, round, and reactive to light.   Neck:      Vascular: No carotid bruit.   Cardiovascular:      Heart sounds: Normal heart sounds, S1 normal and S2 normal. No murmur heard.     No friction rub.   Pulmonary:      Effort: Pulmonary effort is normal.      Breath sounds: Decreased breath sounds (throughout) present.   Abdominal:      Palpations: There is no hepatomegaly, splenomegaly or mass.   Musculoskeletal:         General: No swelling or deformity. Normal range of motion.      Cervical back: Neck supple.      Right lower leg: Edema present.      Left lower leg: Edema present.   Lymphadenopathy:      Cervical: No cervical adenopathy.      Upper Body:      Right upper body: No axillary adenopathy.      Left upper body: No axillary adenopathy.      Lower Body: No right inguinal adenopathy. No left inguinal adenopathy.   Neurological:      Mental Status: He is oriented to person, place, and time. He is lethargic.       Cranial Nerves: Cranial nerves 2-12 are intact. No cranial nerve deficit.      Sensory: No sensory deficit.      Motor: Motor function is intact. No weakness.      Gait: Gait is intact.      Deep Tendon Reflexes: Reflexes normal.   Psychiatric:         Mood and Affect: Mood normal. Mood is not anxious or depressed. Affect is not angry.         Behavior: Behavior is not agitated.         Thought Content: Thought content normal.         Judgment: Judgment normal.     LAB WORK:  Laboratory studies were reviewed.    Assessment/Plan  Problem List Items Addressed This Visit             ICD-10-CM       Advance Directives and General Issues    At risk for falls Z91.81       Cardiac and Vasculature    Atrial fibrillation (Multi) I48.91    Benign hypertension I10    Congestive heart failure (Multi) - Primary I50.9       Gastrointestinal and Abdominal    Gastroesophageal reflux disease without esophagitis K21.9       Mental Health    Depression F32.A    Anxiety F41.9     Other Visit Diagnoses         Codes    Chronic obstructive pulmonary disease, unspecified COPD type (Multi)     J44.9    Type 2 diabetes mellitus without complication, with long-term current use of insulin (Multi)     E11.9, Z79.4    Weakness     R53.1        1. Heart failure, on diuretic.  2. COPD, on bronchodilator therapy.  3. Atrial fibrillation, rate controlled.  4. Diabetes, on insulin.  5. Depression, on medication.  6. Anxiety, on medication.  7. Hypertension, medically controlled.  8. GERD, on PPI.  9. Weakness, on PT/OT.  10. Fall risk, on fall precautions.    Scribe Attestation  By signing my name below, ILauren Scribe attest that this documentation has been prepared under the direction and in the presence of Santiago Hernandez MD.     All medical record entries made by the scribe were personally dictated by me I have reviewed the chart and agree the record accurately reflects my personal performance of his history physical examination and  management      Electronically Signed By: Santiago Hernandez MD   8/17/24 12:13 AM

## 2024-08-13 VITALS
TEMPERATURE: 98 F | DIASTOLIC BLOOD PRESSURE: 82 MMHG | RESPIRATION RATE: 18 BRPM | HEART RATE: 78 BPM | SYSTOLIC BLOOD PRESSURE: 130 MMHG

## 2024-08-13 ASSESSMENT — ENCOUNTER SYMPTOMS
CHILLS: 0
FEVER: 0

## 2024-08-13 NOTE — PROGRESS NOTES
PLACE OF SERVICE:  [facility].    This is a subsequent visit.    Subjective   Patient ID: Lucas Valderrama is a 74 y.o. male who presents for Follow-up.    Mr. Lucas Valderrama is a 74-year-old male with history of congestive heart failure with COPD.  He has multiple medical issues and is unable to care for himself.  He requires supportive care.    Review of Systems   Constitutional:  Negative for chills and fever.   Cardiovascular:  Negative for chest pain.   All other systems reviewed and are negative.    Objective   /82   Pulse 78   Temp 36.7 °C (98 °F)   Resp 18     Physical Exam  Vitals reviewed.   Constitutional:       Comments: This is a well-developed, well-nourished male, lying in bed, appearing weak.   HENT:      Right Ear: Tympanic membrane, ear canal and external ear normal.      Left Ear: Tympanic membrane, ear canal and external ear normal.   Eyes:      General: No scleral icterus.     Pupils: Pupils are equal, round, and reactive to light.   Neck:      Vascular: No carotid bruit.   Cardiovascular:      Heart sounds: Normal heart sounds, S1 normal and S2 normal. No murmur heard.     No friction rub.   Pulmonary:      Effort: Pulmonary effort is normal.      Breath sounds: Decreased breath sounds (throughout) present.   Abdominal:      Palpations: There is no hepatomegaly, splenomegaly or mass.   Musculoskeletal:         General: No swelling or deformity. Normal range of motion.      Cervical back: Neck supple.      Right lower leg: Edema present.      Left lower leg: Edema present.   Lymphadenopathy:      Cervical: No cervical adenopathy.      Upper Body:      Right upper body: No axillary adenopathy.      Left upper body: No axillary adenopathy.      Lower Body: No right inguinal adenopathy. No left inguinal adenopathy.   Neurological:      Mental Status: He is oriented to person, place, and time. He is lethargic.      Cranial Nerves: Cranial nerves 2-12 are intact. No cranial nerve  deficit.      Sensory: No sensory deficit.      Motor: Motor function is intact. No weakness.      Gait: Gait is intact.      Deep Tendon Reflexes: Reflexes normal.   Psychiatric:         Mood and Affect: Mood normal. Mood is not anxious or depressed. Affect is not angry.         Behavior: Behavior is not agitated.         Thought Content: Thought content normal.         Judgment: Judgment normal.     LAB WORK:  Laboratory studies were reviewed.    Assessment/Plan   Problem List Items Addressed This Visit             ICD-10-CM       Advance Directives and General Issues    At risk for falls Z91.81       Cardiac and Vasculature    Atrial fibrillation (Multi) I48.91    Benign hypertension I10    Congestive heart failure (Multi) - Primary I50.9       Gastrointestinal and Abdominal    Gastroesophageal reflux disease without esophagitis K21.9       Mental Health    Depression F32.A    Anxiety F41.9     Other Visit Diagnoses         Codes    Chronic obstructive pulmonary disease, unspecified COPD type (Multi)     J44.9    Type 2 diabetes mellitus without complication, with long-term current use of insulin (Multi)     E11.9, Z79.4    Weakness     R53.1        1. Heart failure, on diuretic.  2. COPD, on bronchodilator therapy.  3. Atrial fibrillation, rate controlled.  4. Diabetes, on insulin.  5. Depression, on medication.  6. Anxiety, on medication.  7. Hypertension, medically controlled.  8. GERD, on PPI.  9. Weakness, on PT/OT.  10. Fall risk, on fall precautions.    Scribe Attestation  By signing my name below, ILauren Scribe attest that this documentation has been prepared under the direction and in the presence of Snatiago Hernandez MD.     All medical record entries made by the scribe were personally dictated by me I have reviewed the chart and agree the record accurately reflects my personal performance of his history physical examination and management

## 2024-08-18 ENCOUNTER — NURSING HOME VISIT (OUTPATIENT)
Dept: POST ACUTE CARE | Facility: EXTERNAL LOCATION | Age: 74
End: 2024-08-18
Payer: MEDICARE

## 2024-08-18 DIAGNOSIS — F41.9 ANXIETY: ICD-10-CM

## 2024-08-18 DIAGNOSIS — J44.9 CHRONIC OBSTRUCTIVE PULMONARY DISEASE, UNSPECIFIED COPD TYPE (MULTI): Primary | ICD-10-CM

## 2024-08-18 DIAGNOSIS — Z79.4 TYPE 2 DIABETES MELLITUS WITHOUT COMPLICATION, WITH LONG-TERM CURRENT USE OF INSULIN (MULTI): ICD-10-CM

## 2024-08-18 DIAGNOSIS — I50.9 CONGESTIVE HEART FAILURE, UNSPECIFIED HF CHRONICITY, UNSPECIFIED HEART FAILURE TYPE (MULTI): ICD-10-CM

## 2024-08-18 DIAGNOSIS — R53.1 WEAKNESS: ICD-10-CM

## 2024-08-18 DIAGNOSIS — K21.9 GASTROESOPHAGEAL REFLUX DISEASE WITHOUT ESOPHAGITIS: ICD-10-CM

## 2024-08-18 DIAGNOSIS — Z91.81 AT RISK FOR FALLS: ICD-10-CM

## 2024-08-18 DIAGNOSIS — E11.9 TYPE 2 DIABETES MELLITUS WITHOUT COMPLICATION, WITH LONG-TERM CURRENT USE OF INSULIN (MULTI): ICD-10-CM

## 2024-08-18 DIAGNOSIS — I10 BENIGN HYPERTENSION: ICD-10-CM

## 2024-08-18 DIAGNOSIS — I48.91 ATRIAL FIBRILLATION, UNSPECIFIED TYPE (MULTI): ICD-10-CM

## 2024-08-18 DIAGNOSIS — F32.A DEPRESSION, UNSPECIFIED DEPRESSION TYPE: ICD-10-CM

## 2024-08-18 PROCEDURE — 99309 SBSQ NF CARE MODERATE MDM 30: CPT | Performed by: INTERNAL MEDICINE

## 2024-08-18 NOTE — LETTER
Patient: Lucas Valderrama  : 1950    Encounter Date: 2024    PLACE OF SERVICE:  [facility]    This is a subsequent visit.    Subjective  Patient ID: Lucas Valderrama is a 74 y.o. male who presents for Follow-up.    Mr. Lucas Valderrama is a 74-year-old male with history of COPD and congestive heart failure.  He is a diabetic and unable to care for himself.  He requires supportive care.    Review of Systems   Constitutional:  Negative for chills and fever.   Cardiovascular:  Negative for chest pain.   All other systems reviewed and are negative.    Objective  /78   Pulse 80   Temp 36.7 °C (98.1 °F)   Resp 18     Physical Exam  Vitals reviewed.   Constitutional:       Comments: This is a well-developed, well-nourished male, lying in bed, appearing weak.   HENT:      Right Ear: Tympanic membrane, ear canal and external ear normal.      Left Ear: Tympanic membrane, ear canal and external ear normal.   Eyes:      General: No scleral icterus.     Pupils: Pupils are equal, round, and reactive to light.   Neck:      Vascular: No carotid bruit.   Cardiovascular:      Heart sounds: Normal heart sounds, S1 normal and S2 normal. No murmur heard.     No friction rub.   Pulmonary:      Breath sounds: Decreased breath sounds (throughout) present.   Abdominal:      Palpations: There is no hepatomegaly, splenomegaly or mass.   Musculoskeletal:         General: No swelling or deformity. Normal range of motion.      Cervical back: Neck supple.      Right lower leg: Edema present.      Left lower leg: Edema present.   Lymphadenopathy:      Cervical: No cervical adenopathy.      Upper Body:      Right upper body: No axillary adenopathy.      Left upper body: No axillary adenopathy.      Lower Body: No right inguinal adenopathy. No left inguinal adenopathy.   Neurological:      Mental Status: He is oriented to person, place, and time. He is lethargic.      Cranial Nerves: Cranial nerves 2-12 are intact. No  cranial nerve deficit.      Sensory: No sensory deficit.      Motor: Motor function is intact. No weakness.      Gait: Gait is intact.      Deep Tendon Reflexes: Reflexes normal.   Psychiatric:         Mood and Affect: Mood normal. Mood is not anxious or depressed. Affect is not angry.         Behavior: Behavior is not agitated.         Thought Content: Thought content normal.         Judgment: Judgment normal.     LAB WORK:  Laboratory studies reviewed.    Assessment/Plan  Problem List Items Addressed This Visit             ICD-10-CM       Advance Directives and General Issues    At risk for falls Z91.81       Cardiac and Vasculature    Atrial fibrillation (Multi) I48.91    Benign hypertension I10    Congestive heart failure (Multi) I50.9       Gastrointestinal and Abdominal    Gastroesophageal reflux disease without esophagitis K21.9       Mental Health    Depression F32.A    Anxiety F41.9     Other Visit Diagnoses         Codes    Chronic obstructive pulmonary disease, unspecified COPD type (Multi)    -  Primary J44.9    Type 2 diabetes mellitus without complication, with long-term current use of insulin (Multi)     E11.9, Z79.4    Weakness     R53.1        1. COPD, on bronchodilator therapy.  2. Congestive heart failure, on diuretic.  3. Atrial fibrillation, rate controlled.  4. Diabetes, on insulin.  5. Depression, on medication.  6. Hypertension, medically controlled.  7. GERD, on PPI.  8. Weakness, on PT/OT.  9. Fall risk, on fall precautions.  10. Anxiety, on medication.    Scribe Attestation  By signing my name below, Marion MARSH Scribe attest that this documentation has been prepared under the direction and in the presence of Santiago Hernandez MD.     All medical record entries made by the scribe were personally dictated by me I have reviewed the chart and agree the record accurately reflects my personal performance of his history physical examination and management      Electronically Signed By:  Santiago Hernandez MD   8/23/24 10:47 PM

## 2024-08-20 VITALS
DIASTOLIC BLOOD PRESSURE: 78 MMHG | TEMPERATURE: 98.1 F | RESPIRATION RATE: 18 BRPM | HEART RATE: 80 BPM | SYSTOLIC BLOOD PRESSURE: 128 MMHG

## 2024-08-20 ASSESSMENT — ENCOUNTER SYMPTOMS
CHILLS: 0
FEVER: 0

## 2024-08-20 NOTE — PROGRESS NOTES
PLACE OF SERVICE:  [facility]    This is a subsequent visit.    Subjective   Patient ID: Lucas Valderrama is a 74 y.o. male who presents for Follow-up.    Mr. Lucas Valderrama is a 74-year-old male with history of COPD and congestive heart failure.  He is a diabetic and unable to care for himself.  He requires supportive care.    Review of Systems   Constitutional:  Negative for chills and fever.   Cardiovascular:  Negative for chest pain.   All other systems reviewed and are negative.    Objective   /78   Pulse 80   Temp 36.7 °C (98.1 °F)   Resp 18     Physical Exam  Vitals reviewed.   Constitutional:       Comments: This is a well-developed, well-nourished male, lying in bed, appearing weak.   HENT:      Right Ear: Tympanic membrane, ear canal and external ear normal.      Left Ear: Tympanic membrane, ear canal and external ear normal.   Eyes:      General: No scleral icterus.     Pupils: Pupils are equal, round, and reactive to light.   Neck:      Vascular: No carotid bruit.   Cardiovascular:      Heart sounds: Normal heart sounds, S1 normal and S2 normal. No murmur heard.     No friction rub.   Pulmonary:      Breath sounds: Decreased breath sounds (throughout) present.   Abdominal:      Palpations: There is no hepatomegaly, splenomegaly or mass.   Musculoskeletal:         General: No swelling or deformity. Normal range of motion.      Cervical back: Neck supple.      Right lower leg: Edema present.      Left lower leg: Edema present.   Lymphadenopathy:      Cervical: No cervical adenopathy.      Upper Body:      Right upper body: No axillary adenopathy.      Left upper body: No axillary adenopathy.      Lower Body: No right inguinal adenopathy. No left inguinal adenopathy.   Neurological:      Mental Status: He is oriented to person, place, and time. He is lethargic.      Cranial Nerves: Cranial nerves 2-12 are intact. No cranial nerve deficit.      Sensory: No sensory deficit.      Motor: Motor  function is intact. No weakness.      Gait: Gait is intact.      Deep Tendon Reflexes: Reflexes normal.   Psychiatric:         Mood and Affect: Mood normal. Mood is not anxious or depressed. Affect is not angry.         Behavior: Behavior is not agitated.         Thought Content: Thought content normal.         Judgment: Judgment normal.     LAB WORK:  Laboratory studies reviewed.    Assessment/Plan   Problem List Items Addressed This Visit             ICD-10-CM       Advance Directives and General Issues    At risk for falls Z91.81       Cardiac and Vasculature    Atrial fibrillation (Multi) I48.91    Benign hypertension I10    Congestive heart failure (Multi) I50.9       Gastrointestinal and Abdominal    Gastroesophageal reflux disease without esophagitis K21.9       Mental Health    Depression F32.A    Anxiety F41.9     Other Visit Diagnoses         Codes    Chronic obstructive pulmonary disease, unspecified COPD type (Multi)    -  Primary J44.9    Type 2 diabetes mellitus without complication, with long-term current use of insulin (Multi)     E11.9, Z79.4    Weakness     R53.1        1. COPD, on bronchodilator therapy.  2. Congestive heart failure, on diuretic.  3. Atrial fibrillation, rate controlled.  4. Diabetes, on insulin.  5. Depression, on medication.  6. Hypertension, medically controlled.  7. GERD, on PPI.  8. Weakness, on PT/OT.  9. Fall risk, on fall precautions.  10. Anxiety, on medication.    Scribe Attestation  By signing my name below, IMarion Scribe attest that this documentation has been prepared under the direction and in the presence of Santiago Hernandez MD.     All medical record entries made by the scribe were personally dictated by me I have reviewed the chart and agree the record accurately reflects my personal performance of his history physical examination and management

## 2024-08-24 ENCOUNTER — NURSING HOME VISIT (OUTPATIENT)
Dept: POST ACUTE CARE | Facility: EXTERNAL LOCATION | Age: 74
End: 2024-08-24
Payer: MEDICARE

## 2024-08-24 DIAGNOSIS — Z79.4 TYPE 2 DIABETES MELLITUS WITHOUT COMPLICATION, WITH LONG-TERM CURRENT USE OF INSULIN (MULTI): ICD-10-CM

## 2024-08-24 DIAGNOSIS — I50.9 CONGESTIVE HEART FAILURE, UNSPECIFIED HF CHRONICITY, UNSPECIFIED HEART FAILURE TYPE (MULTI): Primary | ICD-10-CM

## 2024-08-24 DIAGNOSIS — K21.9 GASTROESOPHAGEAL REFLUX DISEASE WITHOUT ESOPHAGITIS: ICD-10-CM

## 2024-08-24 DIAGNOSIS — E11.9 TYPE 2 DIABETES MELLITUS WITHOUT COMPLICATION, WITH LONG-TERM CURRENT USE OF INSULIN (MULTI): ICD-10-CM

## 2024-08-24 DIAGNOSIS — I10 BENIGN HYPERTENSION: ICD-10-CM

## 2024-08-24 DIAGNOSIS — Z91.81 AT RISK FOR FALLS: ICD-10-CM

## 2024-08-24 DIAGNOSIS — R53.1 WEAKNESS: ICD-10-CM

## 2024-08-24 DIAGNOSIS — F32.A DEPRESSION, UNSPECIFIED DEPRESSION TYPE: ICD-10-CM

## 2024-08-24 DIAGNOSIS — J44.9 CHRONIC OBSTRUCTIVE PULMONARY DISEASE, UNSPECIFIED COPD TYPE (MULTI): ICD-10-CM

## 2024-08-24 DIAGNOSIS — F41.9 ANXIETY: ICD-10-CM

## 2024-08-24 DIAGNOSIS — I48.91 ATRIAL FIBRILLATION, UNSPECIFIED TYPE (MULTI): ICD-10-CM

## 2024-08-24 PROCEDURE — 99309 SBSQ NF CARE MODERATE MDM 30: CPT | Performed by: INTERNAL MEDICINE

## 2024-08-24 NOTE — LETTER
Patient: Lucas Valderrama  : 1950    Encounter Date: 2024    PLACE OF SERVICE:  [facility].    This is a subsequent visit.    Subjective  Patient ID: Lucas Valderrama is a 74 y.o. male who presents for Follow-up.    Mr. Lucas Valderrama is a 74-year-old male with history of congestive heart failure with COPD.  He is  unable to care for himself and manage his affairs.  He requires supportive care.    Review of Systems   Constitutional:  Negative for chills and fever.   Cardiovascular:  Negative for chest pain.   All other systems reviewed and are negative.    Objective  /82   Pulse 78   Temp 36.6 °C (97.8 °F)   Resp 18     Physical Exam  Vitals reviewed.   Constitutional:       General: He is not in acute distress.     Comments: This is a well-developed, well-nourished male, sitting in a chair.   HENT:      Right Ear: Tympanic membrane, ear canal and external ear normal.      Left Ear: Tympanic membrane, ear canal and external ear normal.   Eyes:      General: No scleral icterus.     Pupils: Pupils are equal, round, and reactive to light.   Neck:      Vascular: No carotid bruit.   Cardiovascular:      Heart sounds: Normal heart sounds, S1 normal and S2 normal. No murmur heard.     No friction rub.   Pulmonary:      Effort: Pulmonary effort is normal.      Breath sounds: Decreased breath sounds (throughout) present.   Abdominal:      Palpations: There is no hepatomegaly, splenomegaly or mass.   Musculoskeletal:         General: No swelling or deformity. Normal range of motion.      Cervical back: Neck supple.      Right lower leg: Edema present.      Left lower leg: Edema present.   Lymphadenopathy:      Cervical: No cervical adenopathy.      Upper Body:      Right upper body: No axillary adenopathy.      Left upper body: No axillary adenopathy.      Lower Body: No right inguinal adenopathy. No left inguinal adenopathy.   Neurological:      Mental Status: He is oriented to person, place, and  time.      Cranial Nerves: Cranial nerves 2-12 are intact. No cranial nerve deficit.      Sensory: No sensory deficit.      Motor: Motor function is intact. No weakness.      Gait: Gait is intact.      Deep Tendon Reflexes: Reflexes normal.   Psychiatric:         Mood and Affect: Mood normal. Mood is not anxious or depressed. Affect is not angry.         Behavior: Behavior is not agitated.         Thought Content: Thought content normal.         Judgment: Judgment normal.     LAB WORK:  Laboratory studies were reviewed.    Assessment/Plan  Problem List Items Addressed This Visit             ICD-10-CM       Advance Directives and General Issues    At risk for falls Z91.81       Cardiac and Vasculature    Atrial fibrillation (Multi) I48.91    Benign hypertension I10    Congestive heart failure (Multi) - Primary I50.9       Gastrointestinal and Abdominal    Gastroesophageal reflux disease without esophagitis K21.9       Mental Health    Depression F32.A    Anxiety F41.9     Other Visit Diagnoses         Codes    Chronic obstructive pulmonary disease, unspecified COPD type (Multi)     J44.9    Type 2 diabetes mellitus without complication, with long-term current use of insulin (Multi)     E11.9, Z79.4    Weakness     R53.1        1. Congestive heart failure, on diuretics.  2. COPD, on bronchodilator therapy.  3. Diabetes, on insulin.  4. Atrial fibrillation, rate controlled.  5. Depression, on medication.  6. Hypertension, medically controlled.  7. GERD, on PPI.  8. Anxiety, on medication.  9. Weakness, on PT/OT.  10. Fall risk, on fall precautions.    Scribe Attestation  By signing my name below, ILauren Scribe attest that this documentation has been prepared under the direction and in the presence of Santiago Hernandez MD.     All medical record entries made by the scribe were personally dictated by me I have reviewed the chart and agree the record accurately reflects my personal performance of his history  physical examination and management      Electronically Signed By: Santiago Hernandez MD   8/27/24 12:40 AM

## 2024-08-26 VITALS
DIASTOLIC BLOOD PRESSURE: 82 MMHG | SYSTOLIC BLOOD PRESSURE: 126 MMHG | TEMPERATURE: 97.8 F | RESPIRATION RATE: 18 BRPM | HEART RATE: 78 BPM

## 2024-08-26 ASSESSMENT — ENCOUNTER SYMPTOMS
CHILLS: 0
FEVER: 0

## 2024-08-26 NOTE — PROGRESS NOTES
PLACE OF SERVICE:  [facility].    This is a subsequent visit.    Subjective   Patient ID: Lucas Valderrama is a 74 y.o. male who presents for Follow-up.    Mr. Lucas Valderrama is a 74-year-old male with history of congestive heart failure with COPD.  He is  unable to care for himself and manage his affairs.  He requires supportive care.    Review of Systems   Constitutional:  Negative for chills and fever.   Cardiovascular:  Negative for chest pain.   All other systems reviewed and are negative.    Objective   /82   Pulse 78   Temp 36.6 °C (97.8 °F)   Resp 18     Physical Exam  Vitals reviewed.   Constitutional:       General: He is not in acute distress.     Comments: This is a well-developed, well-nourished male, sitting in a chair.   HENT:      Right Ear: Tympanic membrane, ear canal and external ear normal.      Left Ear: Tympanic membrane, ear canal and external ear normal.   Eyes:      General: No scleral icterus.     Pupils: Pupils are equal, round, and reactive to light.   Neck:      Vascular: No carotid bruit.   Cardiovascular:      Heart sounds: Normal heart sounds, S1 normal and S2 normal. No murmur heard.     No friction rub.   Pulmonary:      Effort: Pulmonary effort is normal.      Breath sounds: Decreased breath sounds (throughout) present.   Abdominal:      Palpations: There is no hepatomegaly, splenomegaly or mass.   Musculoskeletal:         General: No swelling or deformity. Normal range of motion.      Cervical back: Neck supple.      Right lower leg: Edema present.      Left lower leg: Edema present.   Lymphadenopathy:      Cervical: No cervical adenopathy.      Upper Body:      Right upper body: No axillary adenopathy.      Left upper body: No axillary adenopathy.      Lower Body: No right inguinal adenopathy. No left inguinal adenopathy.   Neurological:      Mental Status: He is oriented to person, place, and time.      Cranial Nerves: Cranial nerves 2-12 are intact. No cranial  nerve deficit.      Sensory: No sensory deficit.      Motor: Motor function is intact. No weakness.      Gait: Gait is intact.      Deep Tendon Reflexes: Reflexes normal.   Psychiatric:         Mood and Affect: Mood normal. Mood is not anxious or depressed. Affect is not angry.         Behavior: Behavior is not agitated.         Thought Content: Thought content normal.         Judgment: Judgment normal.     LAB WORK:  Laboratory studies were reviewed.    Assessment/Plan   Problem List Items Addressed This Visit             ICD-10-CM       Advance Directives and General Issues    At risk for falls Z91.81       Cardiac and Vasculature    Atrial fibrillation (Multi) I48.91    Benign hypertension I10    Congestive heart failure (Multi) - Primary I50.9       Gastrointestinal and Abdominal    Gastroesophageal reflux disease without esophagitis K21.9       Mental Health    Depression F32.A    Anxiety F41.9     Other Visit Diagnoses         Codes    Chronic obstructive pulmonary disease, unspecified COPD type (Multi)     J44.9    Type 2 diabetes mellitus without complication, with long-term current use of insulin (Multi)     E11.9, Z79.4    Weakness     R53.1        1. Congestive heart failure, on diuretics.  2. COPD, on bronchodilator therapy.  3. Diabetes, on insulin.  4. Atrial fibrillation, rate controlled.  5. Depression, on medication.  6. Hypertension, medically controlled.  7. GERD, on PPI.  8. Anxiety, on medication.  9. Weakness, on PT/OT.  10. Fall risk, on fall precautions.    Scribe Attestation  By signing my name below, ILauren Scribe attest that this documentation has been prepared under the direction and in the presence of Santiago Hernandez MD.     All medical record entries made by the scribe were personally dictated by me I have reviewed the chart and agree the record accurately reflects my personal performance of his history physical examination and management

## 2024-09-01 ENCOUNTER — NURSING HOME VISIT (OUTPATIENT)
Dept: POST ACUTE CARE | Facility: EXTERNAL LOCATION | Age: 74
End: 2024-09-01
Payer: MEDICARE

## 2024-09-01 DIAGNOSIS — F32.A DEPRESSION, UNSPECIFIED DEPRESSION TYPE: ICD-10-CM

## 2024-09-01 DIAGNOSIS — I48.91 ATRIAL FIBRILLATION, UNSPECIFIED TYPE (MULTI): ICD-10-CM

## 2024-09-01 DIAGNOSIS — I10 BENIGN HYPERTENSION: ICD-10-CM

## 2024-09-01 DIAGNOSIS — J44.9 CHRONIC OBSTRUCTIVE PULMONARY DISEASE, UNSPECIFIED COPD TYPE (MULTI): Primary | ICD-10-CM

## 2024-09-01 DIAGNOSIS — R53.1 WEAKNESS: ICD-10-CM

## 2024-09-01 DIAGNOSIS — I50.9 CONGESTIVE HEART FAILURE, UNSPECIFIED HF CHRONICITY, UNSPECIFIED HEART FAILURE TYPE (MULTI): ICD-10-CM

## 2024-09-01 DIAGNOSIS — Z79.4 TYPE 2 DIABETES MELLITUS WITHOUT COMPLICATION, WITH LONG-TERM CURRENT USE OF INSULIN (MULTI): ICD-10-CM

## 2024-09-01 DIAGNOSIS — E11.9 TYPE 2 DIABETES MELLITUS WITHOUT COMPLICATION, WITH LONG-TERM CURRENT USE OF INSULIN (MULTI): ICD-10-CM

## 2024-09-01 DIAGNOSIS — K21.9 GASTROESOPHAGEAL REFLUX DISEASE WITHOUT ESOPHAGITIS: ICD-10-CM

## 2024-09-01 DIAGNOSIS — Z91.81 AT RISK FOR FALLS: ICD-10-CM

## 2024-09-01 PROCEDURE — 99308 SBSQ NF CARE LOW MDM 20: CPT | Performed by: INTERNAL MEDICINE

## 2024-09-01 NOTE — LETTER
Patient: Lucas Valderrama  : 1950    Encounter Date: 2024    PLACE OF SERVICE:  [facility]    This is a subsequent visit.    Subjective  Patient ID: Lucas Valderrama is a 74 y.o. male who presents for Follow-up.    Mr. Lucas Valderrama is a 74-year-old male with history of depression.  He suffers from COPD as well as CHF.  He is unable to care for himself and requires supportive care.    Review of Systems   All other systems reviewed and are negative.    Objective  /82   Pulse 78   Temp 36.7 °C (98 °F)   Resp 18     Physical Exam  Vitals reviewed.   Constitutional:       Comments: This is a well-developed, well-nourished male, lying in bed, appearing weak   HENT:      Right Ear: Tympanic membrane, ear canal and external ear normal.      Left Ear: Tympanic membrane, ear canal and external ear normal.   Eyes:      General: No scleral icterus.     Pupils: Pupils are equal, round, and reactive to light.   Neck:      Vascular: No carotid bruit.   Cardiovascular:      Heart sounds: Normal heart sounds, S1 normal and S2 normal. No murmur heard.     No friction rub.   Pulmonary:      Breath sounds: Decreased breath sounds (throughout) present.   Abdominal:      Palpations: There is no hepatomegaly, splenomegaly or mass.   Musculoskeletal:         General: No swelling or deformity. Normal range of motion.      Cervical back: Neck supple.      Right lower leg: Edema present.      Left lower leg: Edema present.   Lymphadenopathy:      Cervical: No cervical adenopathy.      Upper Body:      Right upper body: No axillary adenopathy.      Left upper body: No axillary adenopathy.      Lower Body: No right inguinal adenopathy. No left inguinal adenopathy.   Neurological:      Mental Status: He is oriented to person, place, and time. He is lethargic.      Cranial Nerves: Cranial nerves 2-12 are intact. No cranial nerve deficit.      Sensory: No sensory deficit.      Motor: Motor function is intact. No  weakness.      Gait: Gait is intact.      Deep Tendon Reflexes: Reflexes normal.   Psychiatric:         Mood and Affect: Mood normal. Mood is not anxious or depressed. Affect is not angry.         Behavior: Behavior is not agitated.         Thought Content: Thought content normal.         Judgment: Judgment normal.     LAB WORK:  Laboratory studies reviewed.    Assessment/Plan  Problem List Items Addressed This Visit             ICD-10-CM       Advance Directives and General Issues    At risk for falls Z91.81       Cardiac and Vasculature    Atrial fibrillation (Multi) I48.91    Benign hypertension I10    Congestive heart failure (Multi) I50.9       Gastrointestinal and Abdominal    Gastroesophageal reflux disease without esophagitis K21.9       Mental Health    Depression F32.A     Other Visit Diagnoses         Codes    Chronic obstructive pulmonary disease, unspecified COPD type (Multi)    -  Primary J44.9    Type 2 diabetes mellitus without complication, with long-term current use of insulin (Multi)     E11.9, Z79.4    Weakness     R53.1        1. COPD, on bronchodilator therapy.  2. CHF, on diuretic.  3. Atrial fibrillation, rate controlled.  4. Diabetes, on insulin.  5. Hypertension, medically controlled.  6. Depression, on medication.  7. GERD, on PPI.  8. Weakness, on PT/OT.  9. Fall risk, fall precautions.    Scribe Attestation  By signing my name below, IMarion Scribe attest that this documentation has been prepared under the direction and in the presence of Santiago Hernandez MD.       All medical record entries made by the scribe were personally dictated by me I have reviewed the chart and agree the record accurately reflects my personal performance of his history physical examination and management      Electronically Signed By: Santiago Hernandez MD   9/13/24 12:47 AM

## 2024-09-04 VITALS
TEMPERATURE: 98 F | HEART RATE: 78 BPM | RESPIRATION RATE: 18 BRPM | SYSTOLIC BLOOD PRESSURE: 130 MMHG | DIASTOLIC BLOOD PRESSURE: 82 MMHG

## 2024-09-04 NOTE — PROGRESS NOTES
PLACE OF SERVICE:  [facility]    This is a subsequent visit.    Subjective   Patient ID: Lucas Valderrama is a 74 y.o. male who presents for Follow-up.    Mr. Lucas Valderrama is a 74-year-old male with history of depression.  He suffers from COPD as well as CHF.  He is unable to care for himself and requires supportive care.    Review of Systems   All other systems reviewed and are negative.    Objective   /82   Pulse 78   Temp 36.7 °C (98 °F)   Resp 18     Physical Exam  Vitals reviewed.   Constitutional:       Comments: This is a well-developed, well-nourished male, lying in bed, appearing weak   HENT:      Right Ear: Tympanic membrane, ear canal and external ear normal.      Left Ear: Tympanic membrane, ear canal and external ear normal.   Eyes:      General: No scleral icterus.     Pupils: Pupils are equal, round, and reactive to light.   Neck:      Vascular: No carotid bruit.   Cardiovascular:      Heart sounds: Normal heart sounds, S1 normal and S2 normal. No murmur heard.     No friction rub.   Pulmonary:      Breath sounds: Decreased breath sounds (throughout) present.   Abdominal:      Palpations: There is no hepatomegaly, splenomegaly or mass.   Musculoskeletal:         General: No swelling or deformity. Normal range of motion.      Cervical back: Neck supple.      Right lower leg: Edema present.      Left lower leg: Edema present.   Lymphadenopathy:      Cervical: No cervical adenopathy.      Upper Body:      Right upper body: No axillary adenopathy.      Left upper body: No axillary adenopathy.      Lower Body: No right inguinal adenopathy. No left inguinal adenopathy.   Neurological:      Mental Status: He is oriented to person, place, and time. He is lethargic.      Cranial Nerves: Cranial nerves 2-12 are intact. No cranial nerve deficit.      Sensory: No sensory deficit.      Motor: Motor function is intact. No weakness.      Gait: Gait is intact.      Deep Tendon Reflexes: Reflexes  normal.   Psychiatric:         Mood and Affect: Mood normal. Mood is not anxious or depressed. Affect is not angry.         Behavior: Behavior is not agitated.         Thought Content: Thought content normal.         Judgment: Judgment normal.     LAB WORK:  Laboratory studies reviewed.    Assessment/Plan   Problem List Items Addressed This Visit             ICD-10-CM       Advance Directives and General Issues    At risk for falls Z91.81       Cardiac and Vasculature    Atrial fibrillation (Multi) I48.91    Benign hypertension I10    Congestive heart failure (Multi) I50.9       Gastrointestinal and Abdominal    Gastroesophageal reflux disease without esophagitis K21.9       Mental Health    Depression F32.A     Other Visit Diagnoses         Codes    Chronic obstructive pulmonary disease, unspecified COPD type (Multi)    -  Primary J44.9    Type 2 diabetes mellitus without complication, with long-term current use of insulin (Multi)     E11.9, Z79.4    Weakness     R53.1        1. COPD, on bronchodilator therapy.  2. CHF, on diuretic.  3. Atrial fibrillation, rate controlled.  4. Diabetes, on insulin.  5. Hypertension, medically controlled.  6. Depression, on medication.  7. GERD, on PPI.  8. Weakness, on PT/OT.  9. Fall risk, fall precautions.    Scribe Attestation  By signing my name below, I, Keshav Bonilla attest that this documentation has been prepared under the direction and in the presence of Santiago Hernandez MD.       All medical record entries made by the scribe were personally dictated by me I have reviewed the chart and agree the record accurately reflects my personal performance of his history physical examination and management

## 2024-09-08 ENCOUNTER — NURSING HOME VISIT (OUTPATIENT)
Dept: POST ACUTE CARE | Facility: EXTERNAL LOCATION | Age: 74
End: 2024-09-08
Payer: MEDICARE

## 2024-09-08 DIAGNOSIS — Z91.81 AT RISK FOR FALLS: ICD-10-CM

## 2024-09-08 DIAGNOSIS — E11.9 TYPE 2 DIABETES MELLITUS WITHOUT COMPLICATION, WITH LONG-TERM CURRENT USE OF INSULIN (MULTI): ICD-10-CM

## 2024-09-08 DIAGNOSIS — I10 BENIGN HYPERTENSION: ICD-10-CM

## 2024-09-08 DIAGNOSIS — F41.9 ANXIETY: ICD-10-CM

## 2024-09-08 DIAGNOSIS — J44.9 CHRONIC OBSTRUCTIVE PULMONARY DISEASE, UNSPECIFIED COPD TYPE (MULTI): ICD-10-CM

## 2024-09-08 DIAGNOSIS — I48.91 ATRIAL FIBRILLATION, UNSPECIFIED TYPE (MULTI): ICD-10-CM

## 2024-09-08 DIAGNOSIS — F32.A DEPRESSION, UNSPECIFIED DEPRESSION TYPE: ICD-10-CM

## 2024-09-08 DIAGNOSIS — Z79.4 TYPE 2 DIABETES MELLITUS WITHOUT COMPLICATION, WITH LONG-TERM CURRENT USE OF INSULIN (MULTI): ICD-10-CM

## 2024-09-08 DIAGNOSIS — R53.1 WEAKNESS: ICD-10-CM

## 2024-09-08 DIAGNOSIS — G62.9 NEUROPATHY: ICD-10-CM

## 2024-09-08 DIAGNOSIS — K21.9 GASTROESOPHAGEAL REFLUX DISEASE WITHOUT ESOPHAGITIS: ICD-10-CM

## 2024-09-08 DIAGNOSIS — I50.9 CONGESTIVE HEART FAILURE, UNSPECIFIED HF CHRONICITY, UNSPECIFIED HEART FAILURE TYPE: Primary | ICD-10-CM

## 2024-09-08 PROCEDURE — 99309 SBSQ NF CARE MODERATE MDM 30: CPT | Performed by: INTERNAL MEDICINE

## 2024-09-08 NOTE — LETTER
Patient: Lucas Valderrama  : 1950    Encounter Date: 2024    PLACE OF SERVICE:  [facility]    This is a subsequent visit.    Subjective  Patient ID: Lucas Valderrama is a 74 y.o. male who presents for Follow-up.    Mr. Lucas Valderrama is a 74-year-old male with history of congestive heart failure with COPD.  He is unable to care for himself and manage his affairs.  He requires supportive care.    Review of Systems   Constitutional:  Negative for chills and fever.   Cardiovascular:  Negative for chest pain.   All other systems reviewed and are negative.    Objective  /82   Pulse 76   Temp 36.6 °C (97.9 °F)   Resp 18     Physical Exam  Vitals reviewed.   Constitutional:       General: He is not in acute distress.     Comments: This is a well-developed, well-nourished male, sitting in a chair   HENT:      Right Ear: Tympanic membrane, ear canal and external ear normal.      Left Ear: Tympanic membrane, ear canal and external ear normal.   Eyes:      General: No scleral icterus.     Pupils: Pupils are equal, round, and reactive to light.   Neck:      Vascular: No carotid bruit.   Cardiovascular:      Heart sounds: Normal heart sounds, S1 normal and S2 normal. No murmur heard.     No friction rub.   Pulmonary:      Effort: Pulmonary effort is normal.      Breath sounds: Decreased breath sounds (throughout.) present.   Abdominal:      Palpations: There is no hepatomegaly, splenomegaly or mass.   Musculoskeletal:         General: No swelling or deformity. Normal range of motion.      Cervical back: Neck supple.      Right lower leg: Edema present.      Left lower leg: Edema present.   Lymphadenopathy:      Cervical: No cervical adenopathy.      Upper Body:      Right upper body: No axillary adenopathy.      Left upper body: No axillary adenopathy.      Lower Body: No right inguinal adenopathy. No left inguinal adenopathy.   Neurological:      Mental Status: He is oriented to person, place, and  time.      Cranial Nerves: Cranial nerves 2-12 are intact. No cranial nerve deficit.      Sensory: No sensory deficit.      Motor: Motor function is intact. No weakness.      Gait: Gait is intact.      Deep Tendon Reflexes: Reflexes normal.   Psychiatric:         Mood and Affect: Mood normal. Mood is not anxious or depressed. Affect is not angry.         Behavior: Behavior is not agitated.         Thought Content: Thought content normal.         Judgment: Judgment normal.     LAB WORK:  Laboratory studies were reviewed.    Assessment/Plan  Problem List Items Addressed This Visit             ICD-10-CM       Advance Directives and General Issues    At risk for falls Z91.81       Cardiac and Vasculature    Atrial fibrillation (Multi) I48.91    Benign hypertension I10    Congestive heart failure (Multi) - Primary I50.9       Gastrointestinal and Abdominal    Gastroesophageal reflux disease without esophagitis K21.9       Mental Health    Depression F32.A    Anxiety F41.9       Neuro    Neuropathy G62.9     Other Visit Diagnoses         Codes    Chronic obstructive pulmonary disease, unspecified COPD type (Multi)     J44.9    Type 2 diabetes mellitus without complication, with long-term current use of insulin (Multi)     E11.9, Z79.4    Weakness     R53.1        1. Heart failure, on diuretic.  2. COPD, on bronchodilator therapy.  3. Diabetes, on insulin.  4. Hypertension, medically controlled.  5. Depression, on medication.  6. Atrial fibrillation, rate controlled.  7. Neuropathy, on gabapentin.  8. Anxiety, on medication.  9. GERD, on PPI.  10. Weakness, on PT/OT.  11. Fall risk, on fall precautions.    Scribe Attestation  By signing my name below, IMarion Scribe attest that this documentation has been prepared under the direction and in the presence of Santiago Hernandez MD.     All medical record entries made by the scribe were personally dictated by me I have reviewed the chart and agree the record accurately  reflects my personal performance of his history physical examination and management      Electronically Signed By: Santiago Hernandez MD   9/15/24 12:00 AM

## 2024-09-12 VITALS
RESPIRATION RATE: 18 BRPM | HEART RATE: 76 BPM | DIASTOLIC BLOOD PRESSURE: 82 MMHG | SYSTOLIC BLOOD PRESSURE: 126 MMHG | TEMPERATURE: 97.9 F

## 2024-09-12 ASSESSMENT — ENCOUNTER SYMPTOMS
FEVER: 0
CHILLS: 0

## 2024-09-12 NOTE — PROGRESS NOTES
PLACE OF SERVICE:  [facility]    This is a subsequent visit.    Subjective   Patient ID: Lucas Valderrama is a 74 y.o. male who presents for Follow-up.    Mr. Lucas Valderrama is a 74-year-old male with history of congestive heart failure with COPD.  He is unable to care for himself and manage his affairs.  He requires supportive care.    Review of Systems   Constitutional:  Negative for chills and fever.   Cardiovascular:  Negative for chest pain.   All other systems reviewed and are negative.    Objective   /82   Pulse 76   Temp 36.6 °C (97.9 °F)   Resp 18     Physical Exam  Vitals reviewed.   Constitutional:       General: He is not in acute distress.     Comments: This is a well-developed, well-nourished male, sitting in a chair   HENT:      Right Ear: Tympanic membrane, ear canal and external ear normal.      Left Ear: Tympanic membrane, ear canal and external ear normal.   Eyes:      General: No scleral icterus.     Pupils: Pupils are equal, round, and reactive to light.   Neck:      Vascular: No carotid bruit.   Cardiovascular:      Heart sounds: Normal heart sounds, S1 normal and S2 normal. No murmur heard.     No friction rub.   Pulmonary:      Effort: Pulmonary effort is normal.      Breath sounds: Decreased breath sounds (throughout.) present.   Abdominal:      Palpations: There is no hepatomegaly, splenomegaly or mass.   Musculoskeletal:         General: No swelling or deformity. Normal range of motion.      Cervical back: Neck supple.      Right lower leg: Edema present.      Left lower leg: Edema present.   Lymphadenopathy:      Cervical: No cervical adenopathy.      Upper Body:      Right upper body: No axillary adenopathy.      Left upper body: No axillary adenopathy.      Lower Body: No right inguinal adenopathy. No left inguinal adenopathy.   Neurological:      Mental Status: He is oriented to person, place, and time.      Cranial Nerves: Cranial nerves 2-12 are intact. No cranial  nerve deficit.      Sensory: No sensory deficit.      Motor: Motor function is intact. No weakness.      Gait: Gait is intact.      Deep Tendon Reflexes: Reflexes normal.   Psychiatric:         Mood and Affect: Mood normal. Mood is not anxious or depressed. Affect is not angry.         Behavior: Behavior is not agitated.         Thought Content: Thought content normal.         Judgment: Judgment normal.     LAB WORK:  Laboratory studies were reviewed.    Assessment/Plan   Problem List Items Addressed This Visit             ICD-10-CM       Advance Directives and General Issues    At risk for falls Z91.81       Cardiac and Vasculature    Atrial fibrillation (Multi) I48.91    Benign hypertension I10    Congestive heart failure (Multi) - Primary I50.9       Gastrointestinal and Abdominal    Gastroesophageal reflux disease without esophagitis K21.9       Mental Health    Depression F32.A    Anxiety F41.9       Neuro    Neuropathy G62.9     Other Visit Diagnoses         Codes    Chronic obstructive pulmonary disease, unspecified COPD type (Multi)     J44.9    Type 2 diabetes mellitus without complication, with long-term current use of insulin (Multi)     E11.9, Z79.4    Weakness     R53.1        1. Heart failure, on diuretic.  2. COPD, on bronchodilator therapy.  3. Diabetes, on insulin.  4. Hypertension, medically controlled.  5. Depression, on medication.  6. Atrial fibrillation, rate controlled.  7. Neuropathy, on gabapentin.  8. Anxiety, on medication.  9. GERD, on PPI.  10. Weakness, on PT/OT.  11. Fall risk, on fall precautions.    Scribe Attestation  By signing my name below, IMarion Scribe attest that this documentation has been prepared under the direction and in the presence of Santiago Hernandez MD.     All medical record entries made by the scribe were personally dictated by me I have reviewed the chart and agree the record accurately reflects my personal performance of his history physical examination and  management

## 2024-09-22 ENCOUNTER — NURSING HOME VISIT (OUTPATIENT)
Dept: POST ACUTE CARE | Facility: EXTERNAL LOCATION | Age: 74
End: 2024-09-22
Payer: MEDICARE

## 2024-09-22 DIAGNOSIS — I50.9 CONGESTIVE HEART FAILURE, UNSPECIFIED HF CHRONICITY, UNSPECIFIED HEART FAILURE TYPE: ICD-10-CM

## 2024-09-22 DIAGNOSIS — E11.9 TYPE 2 DIABETES MELLITUS WITHOUT COMPLICATION, WITH LONG-TERM CURRENT USE OF INSULIN (MULTI): ICD-10-CM

## 2024-09-22 DIAGNOSIS — J44.9 CHRONIC OBSTRUCTIVE PULMONARY DISEASE, UNSPECIFIED COPD TYPE (MULTI): Primary | ICD-10-CM

## 2024-09-22 DIAGNOSIS — Z91.81 AT RISK FOR FALLS: ICD-10-CM

## 2024-09-22 DIAGNOSIS — I10 BENIGN HYPERTENSION: ICD-10-CM

## 2024-09-22 DIAGNOSIS — F32.A DEPRESSION, UNSPECIFIED DEPRESSION TYPE: ICD-10-CM

## 2024-09-22 DIAGNOSIS — I48.91 ATRIAL FIBRILLATION, UNSPECIFIED TYPE (MULTI): ICD-10-CM

## 2024-09-22 DIAGNOSIS — R53.1 WEAKNESS: ICD-10-CM

## 2024-09-22 DIAGNOSIS — K21.9 GASTROESOPHAGEAL REFLUX DISEASE WITHOUT ESOPHAGITIS: ICD-10-CM

## 2024-09-22 DIAGNOSIS — Z79.4 TYPE 2 DIABETES MELLITUS WITHOUT COMPLICATION, WITH LONG-TERM CURRENT USE OF INSULIN (MULTI): ICD-10-CM

## 2024-09-22 DIAGNOSIS — G62.9 NEUROPATHY: ICD-10-CM

## 2024-09-22 PROCEDURE — 99308 SBSQ NF CARE LOW MDM 20: CPT | Performed by: INTERNAL MEDICINE

## 2024-09-22 NOTE — LETTER
Patient: Lucas Valderrama  : 1950    Encounter Date: 2024    PLACE OF SERVICE:  [facility]    This is a subsequent visit.    Subjective  Patient ID: Lucas Valderrama is a 74 y.o. male who presents for Follow-up.    Mr. Lucas Valderrama is a 74-year-old male with history of diabetes.  He suffers from COPD and CHF.  He is unable to care for himself and requires supportive care.    Review of Systems   Constitutional:  Negative for chills and fever.   Cardiovascular:  Negative for chest pain.   All other systems reviewed and are negative.    Objective  /82   Pulse 76   Temp 36.6 °C (97.9 °F)   Resp 18     Physical Exam  Vitals reviewed.   Constitutional:       General: He is not in acute distress.     Comments: This is a well-developed, well-nourished male, sitting in a chair   HENT:      Right Ear: Tympanic membrane, ear canal and external ear normal.      Left Ear: Tympanic membrane, ear canal and external ear normal.   Eyes:      General: No scleral icterus.     Pupils: Pupils are equal, round, and reactive to light.   Neck:      Vascular: No carotid bruit.   Cardiovascular:      Heart sounds: Normal heart sounds, S1 normal and S2 normal. No murmur heard.     No friction rub.   Pulmonary:      Effort: Pulmonary effort is normal.      Breath sounds: Decreased breath sounds (throughout.) present.   Abdominal:      Palpations: There is no hepatomegaly, splenomegaly or mass.   Musculoskeletal:         General: No swelling or deformity. Normal range of motion.      Cervical back: Neck supple.      Right lower leg: Edema present.      Left lower leg: Edema present.   Lymphadenopathy:      Cervical: No cervical adenopathy.      Upper Body:      Right upper body: No axillary adenopathy.      Left upper body: No axillary adenopathy.      Lower Body: No right inguinal adenopathy. No left inguinal adenopathy.   Neurological:      Mental Status: He is oriented to person, place, and time.      Cranial  Nerves: Cranial nerves 2-12 are intact. No cranial nerve deficit.      Sensory: No sensory deficit.      Motor: Motor function is intact. No weakness.      Gait: Gait is intact.      Deep Tendon Reflexes: Reflexes normal.   Psychiatric:         Mood and Affect: Mood normal. Mood is not anxious or depressed. Affect is not angry.         Behavior: Behavior is not agitated.         Thought Content: Thought content normal.         Judgment: Judgment normal.     LAB WORK:  Laboratory studies were reviewed.    Assessment/Plan  Problem List Items Addressed This Visit             ICD-10-CM       Advance Directives and General Issues    At risk for falls Z91.81       Cardiac and Vasculature    Atrial fibrillation (Multi) I48.91    Benign hypertension I10    Congestive heart failure (Multi) I50.9       Gastrointestinal and Abdominal    Gastroesophageal reflux disease without esophagitis K21.9       Mental Health    Depression F32.A       Neuro    Neuropathy G62.9     Other Visit Diagnoses         Codes    Chronic obstructive pulmonary disease, unspecified COPD type (Multi)    -  Primary J44.9    Type 2 diabetes mellitus without complication, with long-term current use of insulin (Multi)     E11.9, Z79.4    Weakness     R53.1        1. COPD, on bronchodilator therapy.  2. Congestive heart failure, on diuretic.  3. Atrial fibrillation.  Rate controlled.  4. Diabetes, on insulin.  5. Neuropathy, on gabapentin.  6. Hypertension, medically controlled.  7. Depression, on medication.  8. GERD, on PPI.  9. Weakness, on PT/OT.  10. Fall risk, on fall precautions.    Scribe Attestation  By signing my name below, IMarion Scribe attest that this documentation has been prepared under the direction and in the presence of Santiago Hernandez MD.     All medical record entries made by the scribe were personally dictated by me I have reviewed the chart and agree the record accurately reflects my personal performance of his history physical  examination and management      Electronically Signed By: Santiago Hernandez MD   9/27/24  9:55 PM

## 2024-09-24 VITALS
SYSTOLIC BLOOD PRESSURE: 128 MMHG | TEMPERATURE: 97.9 F | DIASTOLIC BLOOD PRESSURE: 82 MMHG | RESPIRATION RATE: 18 BRPM | HEART RATE: 76 BPM

## 2024-09-25 ASSESSMENT — ENCOUNTER SYMPTOMS
FEVER: 0
CHILLS: 0

## 2024-09-25 NOTE — PROGRESS NOTES
PLACE OF SERVICE:  [facility]    This is a subsequent visit.    Subjective   Patient ID: Lucas Valderrama is a 74 y.o. male who presents for Follow-up.    Mr. Lucas Valderrama is a 74-year-old male with history of diabetes.  He suffers from COPD and CHF.  He is unable to care for himself and requires supportive care.    Review of Systems   Constitutional:  Negative for chills and fever.   Cardiovascular:  Negative for chest pain.   All other systems reviewed and are negative.    Objective   /82   Pulse 76   Temp 36.6 °C (97.9 °F)   Resp 18     Physical Exam  Vitals reviewed.   Constitutional:       General: He is not in acute distress.     Comments: This is a well-developed, well-nourished male, sitting in a chair   HENT:      Right Ear: Tympanic membrane, ear canal and external ear normal.      Left Ear: Tympanic membrane, ear canal and external ear normal.   Eyes:      General: No scleral icterus.     Pupils: Pupils are equal, round, and reactive to light.   Neck:      Vascular: No carotid bruit.   Cardiovascular:      Heart sounds: Normal heart sounds, S1 normal and S2 normal. No murmur heard.     No friction rub.   Pulmonary:      Effort: Pulmonary effort is normal.      Breath sounds: Decreased breath sounds (throughout.) present.   Abdominal:      Palpations: There is no hepatomegaly, splenomegaly or mass.   Musculoskeletal:         General: No swelling or deformity. Normal range of motion.      Cervical back: Neck supple.      Right lower leg: Edema present.      Left lower leg: Edema present.   Lymphadenopathy:      Cervical: No cervical adenopathy.      Upper Body:      Right upper body: No axillary adenopathy.      Left upper body: No axillary adenopathy.      Lower Body: No right inguinal adenopathy. No left inguinal adenopathy.   Neurological:      Mental Status: He is oriented to person, place, and time.      Cranial Nerves: Cranial nerves 2-12 are intact. No cranial nerve deficit.       Sensory: No sensory deficit.      Motor: Motor function is intact. No weakness.      Gait: Gait is intact.      Deep Tendon Reflexes: Reflexes normal.   Psychiatric:         Mood and Affect: Mood normal. Mood is not anxious or depressed. Affect is not angry.         Behavior: Behavior is not agitated.         Thought Content: Thought content normal.         Judgment: Judgment normal.     LAB WORK:  Laboratory studies were reviewed.    Assessment/Plan   Problem List Items Addressed This Visit             ICD-10-CM       Advance Directives and General Issues    At risk for falls Z91.81       Cardiac and Vasculature    Atrial fibrillation (Multi) I48.91    Benign hypertension I10    Congestive heart failure (Multi) I50.9       Gastrointestinal and Abdominal    Gastroesophageal reflux disease without esophagitis K21.9       Mental Health    Depression F32.A       Neuro    Neuropathy G62.9     Other Visit Diagnoses         Codes    Chronic obstructive pulmonary disease, unspecified COPD type (Multi)    -  Primary J44.9    Type 2 diabetes mellitus without complication, with long-term current use of insulin (Multi)     E11.9, Z79.4    Weakness     R53.1        1. COPD, on bronchodilator therapy.  2. Congestive heart failure, on diuretic.  3. Atrial fibrillation.  Rate controlled.  4. Diabetes, on insulin.  5. Neuropathy, on gabapentin.  6. Hypertension, medically controlled.  7. Depression, on medication.  8. GERD, on PPI.  9. Weakness, on PT/OT.  10. Fall risk, on fall precautions.    Scribe Attestation  By signing my name below, IMarion Scribe attest that this documentation has been prepared under the direction and in the presence of Santiago Hernandez MD.     All medical record entries made by the scribe were personally dictated by me I have reviewed the chart and agree the record accurately reflects my personal performance of his history physical examination and management

## 2024-09-28 ENCOUNTER — NURSING HOME VISIT (OUTPATIENT)
Dept: POST ACUTE CARE | Facility: EXTERNAL LOCATION | Age: 74
End: 2024-09-28
Payer: MEDICARE

## 2024-09-28 DIAGNOSIS — G62.9 NEUROPATHY: ICD-10-CM

## 2024-09-28 DIAGNOSIS — I50.9 CONGESTIVE HEART FAILURE, UNSPECIFIED HF CHRONICITY, UNSPECIFIED HEART FAILURE TYPE: Primary | ICD-10-CM

## 2024-09-28 DIAGNOSIS — Z79.4 TYPE 2 DIABETES MELLITUS WITHOUT COMPLICATION, WITH LONG-TERM CURRENT USE OF INSULIN (MULTI): ICD-10-CM

## 2024-09-28 DIAGNOSIS — I10 BENIGN HYPERTENSION: ICD-10-CM

## 2024-09-28 DIAGNOSIS — I48.91 ATRIAL FIBRILLATION, UNSPECIFIED TYPE (MULTI): ICD-10-CM

## 2024-09-28 DIAGNOSIS — F32.A DEPRESSION, UNSPECIFIED DEPRESSION TYPE: ICD-10-CM

## 2024-09-28 DIAGNOSIS — K21.9 GASTROESOPHAGEAL REFLUX DISEASE WITHOUT ESOPHAGITIS: ICD-10-CM

## 2024-09-28 DIAGNOSIS — J44.9 CHRONIC OBSTRUCTIVE PULMONARY DISEASE, UNSPECIFIED COPD TYPE (MULTI): ICD-10-CM

## 2024-09-28 DIAGNOSIS — E11.9 TYPE 2 DIABETES MELLITUS WITHOUT COMPLICATION, WITH LONG-TERM CURRENT USE OF INSULIN (MULTI): ICD-10-CM

## 2024-09-28 DIAGNOSIS — Z91.81 AT RISK FOR FALLS: ICD-10-CM

## 2024-09-28 DIAGNOSIS — R53.1 WEAKNESS: ICD-10-CM

## 2024-09-28 PROCEDURE — 99309 SBSQ NF CARE MODERATE MDM 30: CPT | Performed by: INTERNAL MEDICINE

## 2024-09-28 NOTE — LETTER
Patient: Lucas Valderrama  : 1950    Encounter Date: 2024    PLACE OF SERVICE:  [facility].    This is a subsequent visit.    Subjective  Patient ID: Lucas Valderrama is a 74 y.o. male who presents for Follow-up.    Mr. Lucas Valderrama is a 74-year-old male with history of diabetes.  He suffers from congestive heart failure as well as COPD.  He is unable to care for himself and manage his affairs.  He requires supportive care.    Review of Systems   Constitutional:  Negative for chills and fever.   Cardiovascular:  Negative for chest pain.   All other systems reviewed and are negative.    Objective  /78   Pulse 76   Temp 36.6 °C (97.9 °F)   Resp 18     Physical Exam  Vitals reviewed.   Constitutional:       Comments: This is a well-developed, well-nourished male, lying in bed, appearing weak.   HENT:      Right Ear: Tympanic membrane, ear canal and external ear normal.      Left Ear: Tympanic membrane, ear canal and external ear normal.   Eyes:      General: No scleral icterus.     Pupils: Pupils are equal, round, and reactive to light.   Neck:      Vascular: No carotid bruit.   Cardiovascular:      Heart sounds: Normal heart sounds, S1 normal and S2 normal. No murmur heard.     No friction rub.   Pulmonary:      Effort: Pulmonary effort is normal.      Breath sounds: Decreased breath sounds (throughout) present.   Abdominal:      Palpations: There is no hepatomegaly, splenomegaly or mass.   Musculoskeletal:         General: No swelling or deformity. Normal range of motion.      Cervical back: Neck supple.      Right lower leg: Edema present.      Left lower leg: Edema present.   Lymphadenopathy:      Cervical: No cervical adenopathy.      Upper Body:      Right upper body: No axillary adenopathy.      Left upper body: No axillary adenopathy.      Lower Body: No right inguinal adenopathy. No left inguinal adenopathy.   Neurological:      Mental Status: He is oriented to person, place, and  time. He is lethargic.      Cranial Nerves: Cranial nerves 2-12 are intact. No cranial nerve deficit.      Sensory: No sensory deficit.      Motor: Motor function is intact. No weakness.      Gait: Gait is intact.      Deep Tendon Reflexes: Reflexes normal.   Psychiatric:         Mood and Affect: Mood normal. Mood is not anxious or depressed. Affect is not angry.         Behavior: Behavior is not agitated.         Thought Content: Thought content normal.         Judgment: Judgment normal.     LAB WORK:  Laboratory studies were reviewed.    Assessment/Plan  Problem List Items Addressed This Visit             ICD-10-CM       Advance Directives and General Issues    At risk for falls Z91.81       Cardiac and Vasculature    Atrial fibrillation (Multi) I48.91    Benign hypertension I10    Congestive heart failure (Multi) - Primary I50.9       Gastrointestinal and Abdominal    Gastroesophageal reflux disease without esophagitis K21.9       Mental Health    Depression F32.A       Neuro    Neuropathy G62.9     Other Visit Diagnoses         Codes    Chronic obstructive pulmonary disease, unspecified COPD type (Multi)     J44.9    Type 2 diabetes mellitus without complication, with long-term current use of insulin (Multi)     E11.9, Z79.4    Weakness     R53.1        1. Congestive heart failure, on diuretic.  2. COPD, on bronchodilator therapy.  3. Diabetes, on insulin.  4. Neuropathy, on gabapentin.  5. Hypertension, medically controlled.  6. Depression, on medication.  7. Atrial fibrillation.  Rate controlled.  8. GERD, on PPI.  9. Weakness, on PT/OT.  10. Fall risk, on fall precautions.    Scribe Attestation  By signing my name below, I, Keshav Simmons attest that this documentation has been prepared under the direction and in the presence of Santiago Hernandez MD.     All medical record entries made by the scribe were personally dictated by me I have reviewed the chart and agree the record accurately reflects my personal  performance of his history physical examination and management      Electronically Signed By: Santiago Hernandez MD   9/30/24 11:24 PM

## 2024-09-30 VITALS
DIASTOLIC BLOOD PRESSURE: 78 MMHG | TEMPERATURE: 97.9 F | SYSTOLIC BLOOD PRESSURE: 128 MMHG | HEART RATE: 76 BPM | RESPIRATION RATE: 18 BRPM

## 2024-09-30 ASSESSMENT — ENCOUNTER SYMPTOMS
FEVER: 0
CHILLS: 0

## 2024-09-30 NOTE — PROGRESS NOTES
PLACE OF SERVICE:  [facility].    This is a subsequent visit.    Subjective   Patient ID: Lucas Valderrama is a 74 y.o. male who presents for Follow-up.    Mr. Lucas Valderrama is a 74-year-old male with history of diabetes.  He suffers from congestive heart failure as well as COPD.  He is unable to care for himself and manage his affairs.  He requires supportive care.    Review of Systems   Constitutional:  Negative for chills and fever.   Cardiovascular:  Negative for chest pain.   All other systems reviewed and are negative.    Objective   /78   Pulse 76   Temp 36.6 °C (97.9 °F)   Resp 18     Physical Exam  Vitals reviewed.   Constitutional:       Comments: This is a well-developed, well-nourished male, lying in bed, appearing weak.   HENT:      Right Ear: Tympanic membrane, ear canal and external ear normal.      Left Ear: Tympanic membrane, ear canal and external ear normal.   Eyes:      General: No scleral icterus.     Pupils: Pupils are equal, round, and reactive to light.   Neck:      Vascular: No carotid bruit.   Cardiovascular:      Heart sounds: Normal heart sounds, S1 normal and S2 normal. No murmur heard.     No friction rub.   Pulmonary:      Effort: Pulmonary effort is normal.      Breath sounds: Decreased breath sounds (throughout) present.   Abdominal:      Palpations: There is no hepatomegaly, splenomegaly or mass.   Musculoskeletal:         General: No swelling or deformity. Normal range of motion.      Cervical back: Neck supple.      Right lower leg: Edema present.      Left lower leg: Edema present.   Lymphadenopathy:      Cervical: No cervical adenopathy.      Upper Body:      Right upper body: No axillary adenopathy.      Left upper body: No axillary adenopathy.      Lower Body: No right inguinal adenopathy. No left inguinal adenopathy.   Neurological:      Mental Status: He is oriented to person, place, and time. He is lethargic.      Cranial Nerves: Cranial nerves 2-12 are  intact. No cranial nerve deficit.      Sensory: No sensory deficit.      Motor: Motor function is intact. No weakness.      Gait: Gait is intact.      Deep Tendon Reflexes: Reflexes normal.   Psychiatric:         Mood and Affect: Mood normal. Mood is not anxious or depressed. Affect is not angry.         Behavior: Behavior is not agitated.         Thought Content: Thought content normal.         Judgment: Judgment normal.     LAB WORK:  Laboratory studies were reviewed.    Assessment/Plan   Problem List Items Addressed This Visit             ICD-10-CM       Advance Directives and General Issues    At risk for falls Z91.81       Cardiac and Vasculature    Atrial fibrillation (Multi) I48.91    Benign hypertension I10    Congestive heart failure (Multi) - Primary I50.9       Gastrointestinal and Abdominal    Gastroesophageal reflux disease without esophagitis K21.9       Mental Health    Depression F32.A       Neuro    Neuropathy G62.9     Other Visit Diagnoses         Codes    Chronic obstructive pulmonary disease, unspecified COPD type (Multi)     J44.9    Type 2 diabetes mellitus without complication, with long-term current use of insulin (Multi)     E11.9, Z79.4    Weakness     R53.1        1. Congestive heart failure, on diuretic.  2. COPD, on bronchodilator therapy.  3. Diabetes, on insulin.  4. Neuropathy, on gabapentin.  5. Hypertension, medically controlled.  6. Depression, on medication.  7. Atrial fibrillation.  Rate controlled.  8. GERD, on PPI.  9. Weakness, on PT/OT.  10. Fall risk, on fall precautions.    Scribe Attestation  By signing my name below, I, Keshav Simmons attest that this documentation has been prepared under the direction and in the presence of Santiago Hernandez MD.     All medical record entries made by the scribe were personally dictated by me I have reviewed the chart and agree the record accurately reflects my personal performance of his history physical examination and management

## 2024-10-08 ENCOUNTER — NURSING HOME VISIT (OUTPATIENT)
Dept: POST ACUTE CARE | Facility: EXTERNAL LOCATION | Age: 74
End: 2024-10-08
Payer: MEDICARE

## 2024-10-08 DIAGNOSIS — K21.9 GASTROESOPHAGEAL REFLUX DISEASE WITHOUT ESOPHAGITIS: ICD-10-CM

## 2024-10-08 DIAGNOSIS — J44.9 CHRONIC OBSTRUCTIVE PULMONARY DISEASE, UNSPECIFIED COPD TYPE (MULTI): ICD-10-CM

## 2024-10-08 DIAGNOSIS — Z91.81 AT RISK FOR FALLS: ICD-10-CM

## 2024-10-08 DIAGNOSIS — I10 BENIGN HYPERTENSION: ICD-10-CM

## 2024-10-08 DIAGNOSIS — E11.9 TYPE 2 DIABETES MELLITUS WITHOUT COMPLICATION, WITH LONG-TERM CURRENT USE OF INSULIN (MULTI): ICD-10-CM

## 2024-10-08 DIAGNOSIS — F32.A DEPRESSION, UNSPECIFIED DEPRESSION TYPE: ICD-10-CM

## 2024-10-08 DIAGNOSIS — I50.9 CONGESTIVE HEART FAILURE, UNSPECIFIED HF CHRONICITY, UNSPECIFIED HEART FAILURE TYPE: Primary | ICD-10-CM

## 2024-10-08 DIAGNOSIS — I48.91 ATRIAL FIBRILLATION, UNSPECIFIED TYPE (MULTI): ICD-10-CM

## 2024-10-08 DIAGNOSIS — Z79.4 TYPE 2 DIABETES MELLITUS WITHOUT COMPLICATION, WITH LONG-TERM CURRENT USE OF INSULIN (MULTI): ICD-10-CM

## 2024-10-08 DIAGNOSIS — R53.1 WEAKNESS: ICD-10-CM

## 2024-10-08 PROCEDURE — 99309 SBSQ NF CARE MODERATE MDM 30: CPT | Performed by: INTERNAL MEDICINE

## 2024-10-08 NOTE — LETTER
Patient: Lucas Valderrama  : 1950    Encounter Date: 10/08/2024    PLACE OF SERVICE:  [facility], [place].    This is a subsequent visit.    Subjective  Patient ID: Lucas Valderrama is a 74 y.o. male who presents for Follow-up.    Mr. Lucas Valderrama is a 74-year-old male with history of CHF and COPD.  He is unable to care for himself and manage his affairs.  He requires supportive care.    Review of Systems   Constitutional:  Negative for chills and fever.   Cardiovascular:  Negative for chest pain.   All other systems reviewed and are negative.    Objective  /82   Pulse 80   Temp 36.7 °C (98 °F)   Resp 18     Physical Exam  Vitals reviewed.   Constitutional:       Comments: This is a well-developed, well-nourished male, lying in bed, appearing weak.   HENT:      Right Ear: Tympanic membrane, ear canal and external ear normal.      Left Ear: Tympanic membrane, ear canal and external ear normal.   Eyes:      General: No scleral icterus.     Pupils: Pupils are equal, round, and reactive to light.   Neck:      Vascular: No carotid bruit.   Cardiovascular:      Heart sounds: Normal heart sounds, S1 normal and S2 normal. No murmur heard.     No friction rub.   Pulmonary:      Effort: Pulmonary effort is normal.      Breath sounds: Decreased breath sounds (throughout) present.   Abdominal:      Palpations: There is no hepatomegaly, splenomegaly or mass.   Musculoskeletal:         General: No swelling or deformity. Normal range of motion.      Cervical back: Neck supple.      Right lower leg: Edema present.      Left lower leg: Edema present.   Lymphadenopathy:      Cervical: No cervical adenopathy.      Upper Body:      Right upper body: No axillary adenopathy.      Left upper body: No axillary adenopathy.      Lower Body: No right inguinal adenopathy. No left inguinal adenopathy.   Neurological:      Mental Status: He is oriented to person, place, and time. He is lethargic.      Cranial Nerves: Cranial  nerves 2-12 are intact. No cranial nerve deficit.      Sensory: No sensory deficit.      Motor: Motor function is intact. No weakness.      Gait: Gait is intact.      Deep Tendon Reflexes: Reflexes normal.   Psychiatric:         Mood and Affect: Mood normal. Mood is not anxious or depressed. Affect is not angry.         Behavior: Behavior is not agitated.         Thought Content: Thought content normal.         Judgment: Judgment normal.     LAB WORK:  Laboratory studies were reviewed.    Assessment/Plan  Problem List Items Addressed This Visit             ICD-10-CM       Advance Directives and General Issues    At risk for falls Z91.81       Cardiac and Vasculature    Atrial fibrillation (Multi) I48.91    Benign hypertension I10    Congestive heart failure - Primary I50.9       Gastrointestinal and Abdominal    Gastroesophageal reflux disease without esophagitis K21.9       Mental Health    Depression F32.A     Other Visit Diagnoses         Codes    Chronic obstructive pulmonary disease, unspecified COPD type (Multi)     J44.9    Type 2 diabetes mellitus without complication, with long-term current use of insulin (Multi)     E11.9, Z79.4    Weakness     R53.1        1. CHF, on diuretic.  2. COPD, on bronchodilator therapy.  3. Diabetes, on insulin.  4. Hypertension, medically controlled.  5. Atrial fibrillation.  Rate controlled.  6. Depression, on medication.  7. GERD, on PPI.  8. Weakness, on PT/OT.  9. Fall risk, on fall precautions.    Scribe Attestation  By signing my name below, ILauren Scribe attest that this documentation has been prepared under the direction and in the presence of Santiago Hernandez MD.     All medical record entries made by the scribe were personally dictated by me I have reviewed the chart and agree the record accurately reflects my personal performance of his history physical examination and management      Electronically Signed By: Santiago Hernandez MD   10/21/24 11:46 PM

## 2024-10-13 ENCOUNTER — NURSING HOME VISIT (OUTPATIENT)
Dept: POST ACUTE CARE | Facility: EXTERNAL LOCATION | Age: 74
End: 2024-10-13
Payer: MEDICARE

## 2024-10-13 DIAGNOSIS — F32.A DEPRESSION, UNSPECIFIED DEPRESSION TYPE: ICD-10-CM

## 2024-10-13 DIAGNOSIS — J44.9 CHRONIC OBSTRUCTIVE PULMONARY DISEASE, UNSPECIFIED COPD TYPE (MULTI): Primary | ICD-10-CM

## 2024-10-13 DIAGNOSIS — I50.9 CONGESTIVE HEART FAILURE, UNSPECIFIED HF CHRONICITY, UNSPECIFIED HEART FAILURE TYPE: ICD-10-CM

## 2024-10-13 DIAGNOSIS — I48.91 ATRIAL FIBRILLATION, UNSPECIFIED TYPE (MULTI): ICD-10-CM

## 2024-10-13 DIAGNOSIS — Z79.4 TYPE 2 DIABETES MELLITUS WITHOUT COMPLICATION, WITH LONG-TERM CURRENT USE OF INSULIN (MULTI): ICD-10-CM

## 2024-10-13 DIAGNOSIS — Z91.81 AT RISK FOR FALLS: ICD-10-CM

## 2024-10-13 DIAGNOSIS — E11.9 TYPE 2 DIABETES MELLITUS WITHOUT COMPLICATION, WITH LONG-TERM CURRENT USE OF INSULIN (MULTI): ICD-10-CM

## 2024-10-13 DIAGNOSIS — K21.9 GASTROESOPHAGEAL REFLUX DISEASE WITHOUT ESOPHAGITIS: ICD-10-CM

## 2024-10-13 DIAGNOSIS — R53.1 WEAKNESS: ICD-10-CM

## 2024-10-13 DIAGNOSIS — I10 BENIGN HYPERTENSION: ICD-10-CM

## 2024-10-13 PROCEDURE — 99308 SBSQ NF CARE LOW MDM 20: CPT | Performed by: INTERNAL MEDICINE

## 2024-10-13 NOTE — LETTER
Patient: Lucas Valderrama  : 1950    Encounter Date: 10/13/2024    PLACE OF SERVICE:  [facility].    This is a subsequent visit.    Subjective  Patient ID: Lucas Valderrama is a 74 y.o. male who presents for Follow-up.    Mr. Lucas Valderrama is a 74-year-old male with history of COPD and CHF.  He is unable to care for himself and requires supportive care.    Review of Systems   Constitutional:  Negative for chills and fever.   Cardiovascular:  Negative for chest pain.   All other systems reviewed and are negative.    Objective  /82   Pulse 82   Temp 36.6 °C (97.9 °F)   Resp 18     Physical Exam  Vitals reviewed.   Constitutional:       Comments: This is a well-developed, well-nourished male, lying in bed, appearing weak.   HENT:      Right Ear: Tympanic membrane, ear canal and external ear normal.      Left Ear: Tympanic membrane, ear canal and external ear normal.   Eyes:      General: No scleral icterus.     Pupils: Pupils are equal, round, and reactive to light.   Neck:      Vascular: No carotid bruit.   Cardiovascular:      Heart sounds: Normal heart sounds, S1 normal and S2 normal. No murmur heard.     No friction rub.   Pulmonary:      Effort: Pulmonary effort is normal.      Breath sounds: Decreased breath sounds (throughout) present.   Abdominal:      Palpations: There is no hepatomegaly, splenomegaly or mass.   Musculoskeletal:         General: No swelling or deformity. Normal range of motion.      Cervical back: Neck supple.      Right lower leg: Edema present.      Left lower leg: Edema present.   Lymphadenopathy:      Cervical: No cervical adenopathy.      Upper Body:      Right upper body: No axillary adenopathy.      Left upper body: No axillary adenopathy.      Lower Body: No right inguinal adenopathy. No left inguinal adenopathy.   Neurological:      Mental Status: He is oriented to person, place, and time. He is lethargic.      Cranial Nerves: Cranial nerves 2-12 are intact. No  cranial nerve deficit.      Sensory: No sensory deficit.      Motor: Motor function is intact. No weakness.      Gait: Gait is intact.      Deep Tendon Reflexes: Reflexes normal.   Psychiatric:         Mood and Affect: Mood normal. Mood is not anxious or depressed. Affect is not angry.         Behavior: Behavior is not agitated.         Thought Content: Thought content normal.         Judgment: Judgment normal.     LAB WORK: Laboratory studies reviewed.    Assessment/Plan  Problem List Items Addressed This Visit             ICD-10-CM       Advance Directives and General Issues    At risk for falls Z91.81       Cardiac and Vasculature    Atrial fibrillation (Multi) I48.91    Benign hypertension I10    Congestive heart failure I50.9       Gastrointestinal and Abdominal    Gastroesophageal reflux disease without esophagitis K21.9       Mental Health    Depression F32.A     Other Visit Diagnoses         Codes    Chronic obstructive pulmonary disease, unspecified COPD type (Multi)    -  Primary J44.9    Type 2 diabetes mellitus without complication, with long-term current use of insulin (Multi)     E11.9, Z79.4    Weakness     R53.1        1. COPD, on bronchodilator therapy.  2. Heart failure, on diuretic.  3. Atrial fibrillation, rate controlled.  4. Hypertension, medically controlled.  5. Diabetes, on insulin.  6. GERD, on PPI.  7. Depression, on medication.  8. Weakness, on PT/OT.  9. Fall risk, on fall precautions.    Scribe Attestation  By signing my name below, ILauren, Scribcristina attest that this documentation has been prepared under the direction and in the presence of Santiago Hernandez MD.       Electronically Signed By: Santiago Hernandez MD   10/23/24  9:43 PM

## 2024-10-15 VITALS
TEMPERATURE: 98 F | RESPIRATION RATE: 18 BRPM | SYSTOLIC BLOOD PRESSURE: 128 MMHG | DIASTOLIC BLOOD PRESSURE: 82 MMHG | HEART RATE: 80 BPM

## 2024-10-15 VITALS
HEART RATE: 82 BPM | SYSTOLIC BLOOD PRESSURE: 130 MMHG | DIASTOLIC BLOOD PRESSURE: 82 MMHG | RESPIRATION RATE: 18 BRPM | TEMPERATURE: 97.9 F

## 2024-10-15 ASSESSMENT — ENCOUNTER SYMPTOMS
FEVER: 0
CHILLS: 0
CHILLS: 0
FEVER: 0

## 2024-10-15 NOTE — PROGRESS NOTES
PLACE OF SERVICE:  [facility].    This is a subsequent visit.    Subjective   Patient ID: Lucas Valderrama is a 74 y.o. male who presents for Follow-up.    Mr. Lucas Valderrama is a 74-year-old male with history of CHF and COPD.  He is unable to care for himself and manage his affairs.  He requires supportive care.    Review of Systems   Constitutional:  Negative for chills and fever.   Cardiovascular:  Negative for chest pain.   All other systems reviewed and are negative.    Objective   /82   Pulse 80   Temp 36.7 °C (98 °F)   Resp 18     Physical Exam  Vitals reviewed.   Constitutional:       Comments: This is a well-developed, well-nourished male, lying in bed, appearing weak.   HENT:      Right Ear: Tympanic membrane, ear canal and external ear normal.      Left Ear: Tympanic membrane, ear canal and external ear normal.   Eyes:      General: No scleral icterus.     Pupils: Pupils are equal, round, and reactive to light.   Neck:      Vascular: No carotid bruit.   Cardiovascular:      Heart sounds: Normal heart sounds, S1 normal and S2 normal. No murmur heard.     No friction rub.   Pulmonary:      Effort: Pulmonary effort is normal.      Breath sounds: Decreased breath sounds (throughout) present.   Abdominal:      Palpations: There is no hepatomegaly, splenomegaly or mass.   Musculoskeletal:         General: No swelling or deformity. Normal range of motion.      Cervical back: Neck supple.      Right lower leg: Edema present.      Left lower leg: Edema present.   Lymphadenopathy:      Cervical: No cervical adenopathy.      Upper Body:      Right upper body: No axillary adenopathy.      Left upper body: No axillary adenopathy.      Lower Body: No right inguinal adenopathy. No left inguinal adenopathy.   Neurological:      Mental Status: He is oriented to person, place, and time. He is lethargic.      Cranial Nerves: Cranial nerves 2-12 are intact. No cranial nerve deficit.      Sensory: No sensory  deficit.      Motor: Motor function is intact. No weakness.      Gait: Gait is intact.      Deep Tendon Reflexes: Reflexes normal.   Psychiatric:         Mood and Affect: Mood normal. Mood is not anxious or depressed. Affect is not angry.         Behavior: Behavior is not agitated.         Thought Content: Thought content normal.         Judgment: Judgment normal.     LAB WORK:  Laboratory studies were reviewed.    Assessment/Plan   Problem List Items Addressed This Visit             ICD-10-CM       Advance Directives and General Issues    At risk for falls Z91.81       Cardiac and Vasculature    Atrial fibrillation (Multi) I48.91    Benign hypertension I10    Congestive heart failure - Primary I50.9       Gastrointestinal and Abdominal    Gastroesophageal reflux disease without esophagitis K21.9       Mental Health    Depression F32.A     Other Visit Diagnoses         Codes    Chronic obstructive pulmonary disease, unspecified COPD type (Multi)     J44.9    Type 2 diabetes mellitus without complication, with long-term current use of insulin (Multi)     E11.9, Z79.4    Weakness     R53.1        1. CHF, on diuretic.  2. COPD, on bronchodilator therapy.  3. Diabetes, on insulin.  4. Hypertension, medically controlled.  5. Atrial fibrillation.  Rate controlled.  6. Depression, on medication.  7. GERD, on PPI.  8. Weakness, on PT/OT.  9. Fall risk, on fall precautions.    Scribe Attestation  By signing my name below, I, Keshav Simmons attest that this documentation has been prepared under the direction and in the presence of Santiago Hernandez MD.     All medical record entries made by the scribe were personally dictated by me I have reviewed the chart and agree the record accurately reflects my personal performance of his history physical examination and management

## 2024-10-15 NOTE — PROGRESS NOTES
PLACE OF SERVICE:  [facility].    This is a subsequent visit.    Subjective   Patient ID: Lucas Valderrama is a 74 y.o. male who presents for Follow-up.    Mr. Lucas Valderrama is a 74-year-old male with history of COPD and CHF.  He is unable to care for himself and requires supportive care.    Review of Systems   Constitutional:  Negative for chills and fever.   Cardiovascular:  Negative for chest pain.   All other systems reviewed and are negative.    Objective   /82   Pulse 82   Temp 36.6 °C (97.9 °F)   Resp 18     Physical Exam  Vitals reviewed.   Constitutional:       Comments: This is a well-developed, well-nourished male, lying in bed, appearing weak.   HENT:      Right Ear: Tympanic membrane, ear canal and external ear normal.      Left Ear: Tympanic membrane, ear canal and external ear normal.   Eyes:      General: No scleral icterus.     Pupils: Pupils are equal, round, and reactive to light.   Neck:      Vascular: No carotid bruit.   Cardiovascular:      Heart sounds: Normal heart sounds, S1 normal and S2 normal. No murmur heard.     No friction rub.   Pulmonary:      Effort: Pulmonary effort is normal.      Breath sounds: Decreased breath sounds (throughout) present.   Abdominal:      Palpations: There is no hepatomegaly, splenomegaly or mass.   Musculoskeletal:         General: No swelling or deformity. Normal range of motion.      Cervical back: Neck supple.      Right lower leg: Edema present.      Left lower leg: Edema present.   Lymphadenopathy:      Cervical: No cervical adenopathy.      Upper Body:      Right upper body: No axillary adenopathy.      Left upper body: No axillary adenopathy.      Lower Body: No right inguinal adenopathy. No left inguinal adenopathy.   Neurological:      Mental Status: He is oriented to person, place, and time. He is lethargic.      Cranial Nerves: Cranial nerves 2-12 are intact. No cranial nerve deficit.      Sensory: No sensory deficit.      Motor:  Motor function is intact. No weakness.      Gait: Gait is intact.      Deep Tendon Reflexes: Reflexes normal.   Psychiatric:         Mood and Affect: Mood normal. Mood is not anxious or depressed. Affect is not angry.         Behavior: Behavior is not agitated.         Thought Content: Thought content normal.         Judgment: Judgment normal.     LAB WORK: Laboratory studies reviewed.    Assessment/Plan   Problem List Items Addressed This Visit             ICD-10-CM       Advance Directives and General Issues    At risk for falls Z91.81       Cardiac and Vasculature    Atrial fibrillation (Multi) I48.91    Benign hypertension I10    Congestive heart failure I50.9       Gastrointestinal and Abdominal    Gastroesophageal reflux disease without esophagitis K21.9       Mental Health    Depression F32.A     Other Visit Diagnoses         Codes    Chronic obstructive pulmonary disease, unspecified COPD type (Multi)    -  Primary J44.9    Type 2 diabetes mellitus without complication, with long-term current use of insulin (Multi)     E11.9, Z79.4    Weakness     R53.1        1. COPD, on bronchodilator therapy.  2. Heart failure, on diuretic.  3. Atrial fibrillation, rate controlled.  4. Hypertension, medically controlled.  5. Diabetes, on insulin.  6. GERD, on PPI.  7. Depression, on medication.  8. Weakness, on PT/OT.  9. Fall risk, on fall precautions.    Scribe Attestation  By signing my name below, ILauren Scribe attest that this documentation has been prepared under the direction and in the presence of Santiago Hernandez MD.

## 2024-10-19 ENCOUNTER — NURSING HOME VISIT (OUTPATIENT)
Dept: POST ACUTE CARE | Facility: EXTERNAL LOCATION | Age: 74
End: 2024-10-19
Payer: MEDICARE

## 2024-10-19 DIAGNOSIS — J44.9 CHRONIC OBSTRUCTIVE PULMONARY DISEASE, UNSPECIFIED COPD TYPE (MULTI): Primary | ICD-10-CM

## 2024-10-19 DIAGNOSIS — Z79.4 TYPE 2 DIABETES MELLITUS WITHOUT COMPLICATION, WITH LONG-TERM CURRENT USE OF INSULIN (MULTI): ICD-10-CM

## 2024-10-19 DIAGNOSIS — F32.A DEPRESSION, UNSPECIFIED DEPRESSION TYPE: ICD-10-CM

## 2024-10-19 DIAGNOSIS — I48.91 ATRIAL FIBRILLATION, UNSPECIFIED TYPE (MULTI): ICD-10-CM

## 2024-10-19 DIAGNOSIS — I10 BENIGN HYPERTENSION: ICD-10-CM

## 2024-10-19 DIAGNOSIS — Z91.81 AT RISK FOR FALLS: ICD-10-CM

## 2024-10-19 DIAGNOSIS — R53.1 WEAKNESS: ICD-10-CM

## 2024-10-19 DIAGNOSIS — I50.9 CONGESTIVE HEART FAILURE, UNSPECIFIED HF CHRONICITY, UNSPECIFIED HEART FAILURE TYPE: ICD-10-CM

## 2024-10-19 DIAGNOSIS — E11.9 TYPE 2 DIABETES MELLITUS WITHOUT COMPLICATION, WITH LONG-TERM CURRENT USE OF INSULIN (MULTI): ICD-10-CM

## 2024-10-19 DIAGNOSIS — K21.9 GASTROESOPHAGEAL REFLUX DISEASE WITHOUT ESOPHAGITIS: ICD-10-CM

## 2024-10-19 PROCEDURE — 99308 SBSQ NF CARE LOW MDM 20: CPT | Performed by: INTERNAL MEDICINE

## 2024-10-19 NOTE — LETTER
Patient: Lucas Valderrama  : 1950    Encounter Date: 10/19/2024    PLACE OF SERVICE:  [facility]    This is a subsequent visit.    Subjective  Patient ID: Lucas Valderrama is a 74 y.o. male who presents for Follow-up.    Mr. Lucas Valderrama is a 74-year-old male with history of COPD and heart failure.  He is unable to care for himself and manage his affairs.  He requires supportive care.    Review of Systems   Constitutional:  Negative for chills and fever.   Cardiovascular:  Negative for chest pain.   All other systems reviewed and are negative.    Objective  /82   Pulse 78   Temp 36.7 °C (98.1 °F)   Resp 18     Physical Exam  Vitals reviewed.   Constitutional:       Comments: This is a well-developed, well-nourished male, lying in bed, appearing weak    HENT:      Right Ear: Tympanic membrane, ear canal and external ear normal.      Left Ear: Tympanic membrane, ear canal and external ear normal.   Eyes:      General: No scleral icterus.     Pupils: Pupils are equal, round, and reactive to light.   Neck:      Vascular: No carotid bruit.   Cardiovascular:      Heart sounds: Normal heart sounds, S1 normal and S2 normal. No murmur heard.     No friction rub.   Pulmonary:      Breath sounds: Decreased breath sounds (throughout) present.   Abdominal:      Palpations: There is no hepatomegaly, splenomegaly or mass.   Musculoskeletal:         General: No swelling or deformity. Normal range of motion.      Cervical back: Neck supple.      Right lower leg: Edema present.      Left lower leg: Edema present.   Lymphadenopathy:      Cervical: No cervical adenopathy.      Upper Body:      Right upper body: No axillary adenopathy.      Left upper body: No axillary adenopathy.      Lower Body: No right inguinal adenopathy. No left inguinal adenopathy.   Neurological:      Mental Status: He is oriented to person, place, and time. He is lethargic.      Cranial Nerves: Cranial nerves 2-12 are intact. No cranial  nerve deficit.      Sensory: No sensory deficit.      Motor: Motor function is intact. No weakness.      Gait: Gait is intact.      Deep Tendon Reflexes: Reflexes normal.   Psychiatric:         Mood and Affect: Mood normal. Mood is not anxious or depressed. Affect is not angry.         Behavior: Behavior is not agitated.         Thought Content: Thought content normal.         Judgment: Judgment normal.     LAB WORK:  Laboratory studies were reviewed.    Assessment/Plan  Problem List Items Addressed This Visit             ICD-10-CM       Advance Directives and General Issues    At risk for falls Z91.81       Cardiac and Vasculature    Atrial fibrillation (Multi) I48.91    Benign hypertension I10    Congestive heart failure I50.9       Gastrointestinal and Abdominal    Gastroesophageal reflux disease without esophagitis K21.9       Mental Health    Depression F32.A     Other Visit Diagnoses         Codes    Chronic obstructive pulmonary disease, unspecified COPD type (Multi)    -  Primary J44.9    Type 2 diabetes mellitus without complication, with long-term current use of insulin (Multi)     E11.9, Z79.4    Weakness     R53.1        1. COPD, on bronchodilator therapy.  2. Heart failure, on diuretic.  3. Atrial fibrillation, rate controlled.  4. Hypertension, medically controlled.  5. Diabetes, on insulin.  6. GERD, on PPI.  7. Weakness, on PT/OT.  8. Fall risk, on fall precautions.  9. Depression, on medication.    Scribe Attestation  By signing my name below, I, Keshav Bonilla attest that this documentation has been prepared under the direction and in the presence of Santiago Hernandez MD.     All medical record entries made by the scribe were personally dictated by me I have reviewed the chart and agree the record accurately reflects my personal performance of his history physical examination and management      Electronically Signed By: Santiago Hernandez MD   10/22/24 11:37 PM

## 2024-10-21 VITALS
RESPIRATION RATE: 18 BRPM | HEART RATE: 78 BPM | TEMPERATURE: 98.1 F | SYSTOLIC BLOOD PRESSURE: 128 MMHG | DIASTOLIC BLOOD PRESSURE: 82 MMHG

## 2024-10-21 ASSESSMENT — ENCOUNTER SYMPTOMS
FEVER: 0
CHILLS: 0

## 2024-10-21 NOTE — PROGRESS NOTES
PLACE OF SERVICE:  [facility], [place], operated by [facility]    This is a subsequent visit.    Subjective   Patient ID: Lucas Valderrama is a 74 y.o. male who presents for Follow-up.    Mr. Lucas Valderrama is a 74-year-old male with history of COPD and heart failure.  He is unable to care for himself and manage his affairs.  He requires supportive care.    Review of Systems   Constitutional:  Negative for chills and fever.   Cardiovascular:  Negative for chest pain.   All other systems reviewed and are negative.    Objective   /82   Pulse 78   Temp 36.7 °C (98.1 °F)   Resp 18     Physical Exam  Vitals reviewed.   Constitutional:       Comments: This is a well-developed, well-nourished male, lying in bed, appearing weak    HENT:      Right Ear: Tympanic membrane, ear canal and external ear normal.      Left Ear: Tympanic membrane, ear canal and external ear normal.   Eyes:      General: No scleral icterus.     Pupils: Pupils are equal, round, and reactive to light.   Neck:      Vascular: No carotid bruit.   Cardiovascular:      Heart sounds: Normal heart sounds, S1 normal and S2 normal. No murmur heard.     No friction rub.   Pulmonary:      Breath sounds: Decreased breath sounds (throughout) present.   Abdominal:      Palpations: There is no hepatomegaly, splenomegaly or mass.   Musculoskeletal:         General: No swelling or deformity. Normal range of motion.      Cervical back: Neck supple.      Right lower leg: Edema present.      Left lower leg: Edema present.   Lymphadenopathy:      Cervical: No cervical adenopathy.      Upper Body:      Right upper body: No axillary adenopathy.      Left upper body: No axillary adenopathy.      Lower Body: No right inguinal adenopathy. No left inguinal adenopathy.   Neurological:      Mental Status: He is oriented to person, place, and time. He is lethargic.      Cranial Nerves: Cranial nerves 2-12 are intact. No cranial nerve deficit.      Sensory: No sensory deficit.      Motor: Motor  function is intact. No weakness.      Gait: Gait is intact.      Deep Tendon Reflexes: Reflexes normal.   Psychiatric:         Mood and Affect: Mood normal. Mood is not anxious or depressed. Affect is not angry.         Behavior: Behavior is not agitated.         Thought Content: Thought content normal.         Judgment: Judgment normal.     LAB WORK:  Laboratory studies were reviewed.    Assessment/Plan   Problem List Items Addressed This Visit             ICD-10-CM       Advance Directives and General Issues    At risk for falls Z91.81       Cardiac and Vasculature    Atrial fibrillation (Multi) I48.91    Benign hypertension I10    Congestive heart failure I50.9       Gastrointestinal and Abdominal    Gastroesophageal reflux disease without esophagitis K21.9       Mental Health    Depression F32.A     Other Visit Diagnoses         Codes    Chronic obstructive pulmonary disease, unspecified COPD type (Multi)    -  Primary J44.9    Type 2 diabetes mellitus without complication, with long-term current use of insulin (Multi)     E11.9, Z79.4    Weakness     R53.1        1. COPD, on bronchodilator therapy.  2. Heart failure, on diuretic.  3. Atrial fibrillation, rate controlled.  4. Hypertension, medically controlled.  5. Diabetes, on insulin.  6. GERD, on PPI.  7. Weakness, on PT/OT.  8. Fall risk, on fall precautions.  9. Depression, on medication.    Scribe Attestation  By signing my name below, I, Keshav Bonilla attest that this documentation has been prepared under the direction and in the presence of Santiago Hernandez MD.     All medical record entries made by the scribe were personally dictated by me I have reviewed the chart and agree the record accurately reflects my personal performance of his history physical examination and management

## 2024-11-03 ENCOUNTER — NURSING HOME VISIT (OUTPATIENT)
Dept: POST ACUTE CARE | Facility: EXTERNAL LOCATION | Age: 74
End: 2024-11-03
Payer: MEDICARE

## 2024-11-03 DIAGNOSIS — J44.9 CHRONIC OBSTRUCTIVE PULMONARY DISEASE, UNSPECIFIED COPD TYPE (MULTI): ICD-10-CM

## 2024-11-03 DIAGNOSIS — Z79.4 TYPE 2 DIABETES MELLITUS WITHOUT COMPLICATION, WITH LONG-TERM CURRENT USE OF INSULIN (MULTI): ICD-10-CM

## 2024-11-03 DIAGNOSIS — I10 BENIGN HYPERTENSION: ICD-10-CM

## 2024-11-03 DIAGNOSIS — F32.A DEPRESSION, UNSPECIFIED DEPRESSION TYPE: ICD-10-CM

## 2024-11-03 DIAGNOSIS — R53.1 WEAKNESS: ICD-10-CM

## 2024-11-03 DIAGNOSIS — E11.9 TYPE 2 DIABETES MELLITUS WITHOUT COMPLICATION, WITH LONG-TERM CURRENT USE OF INSULIN (MULTI): ICD-10-CM

## 2024-11-03 DIAGNOSIS — I50.9 CONGESTIVE HEART FAILURE, UNSPECIFIED HF CHRONICITY, UNSPECIFIED HEART FAILURE TYPE: Primary | ICD-10-CM

## 2024-11-03 DIAGNOSIS — Z91.81 AT RISK FOR FALLS: ICD-10-CM

## 2024-11-03 DIAGNOSIS — K21.9 GASTROESOPHAGEAL REFLUX DISEASE WITHOUT ESOPHAGITIS: ICD-10-CM

## 2024-11-03 DIAGNOSIS — I48.91 ATRIAL FIBRILLATION, UNSPECIFIED TYPE (MULTI): ICD-10-CM

## 2024-11-03 PROCEDURE — 99308 SBSQ NF CARE LOW MDM 20: CPT | Performed by: INTERNAL MEDICINE

## 2024-11-03 NOTE — LETTER
Patient: Lucas Valderrama  : 1950    Encounter Date: 2024    PLACE OF SERVICE:  [facility].    This is a subsequent visit.    Subjective  Patient ID: Lucas Valderrama is a 74 y.o. male who presents for Follow-up.    Mr. Lucas Valderrama is a 74-year-old male with heart failure and COPD.  He is a diabetic and unable to care for himself.  He requires supportive care.    Review of Systems   Constitutional:  Negative for chills and fever.   Cardiovascular:  Negative for chest pain.   All other systems reviewed and are negative.    Objective  /78   Pulse 76   Temp 36.8 °C (98.2 °F)   Resp 18     Physical Exam  Vitals reviewed.   Constitutional:       Comments: This is a well-developed, well-nourished male, lying in bed, appearing weak.   HENT:      Right Ear: Tympanic membrane, ear canal and external ear normal.      Left Ear: Tympanic membrane, ear canal and external ear normal.   Eyes:      General: No scleral icterus.     Pupils: Pupils are equal, round, and reactive to light.   Neck:      Vascular: No carotid bruit.   Cardiovascular:      Heart sounds: Normal heart sounds, S1 normal and S2 normal. No murmur heard.     No friction rub.   Pulmonary:      Effort: Pulmonary effort is normal.      Breath sounds: Decreased breath sounds (throughout) present.   Abdominal:      Palpations: There is no hepatomegaly, splenomegaly or mass.   Musculoskeletal:         General: No swelling or deformity. Normal range of motion.      Cervical back: Neck supple.      Right lower leg: Edema present.      Left lower leg: Edema present.   Lymphadenopathy:      Cervical: No cervical adenopathy.      Upper Body:      Right upper body: No axillary adenopathy.      Left upper body: No axillary adenopathy.      Lower Body: No right inguinal adenopathy. No left inguinal adenopathy.   Neurological:      Mental Status: He is oriented to person, place, and time. He is lethargic.      Cranial Nerves: Cranial nerves 2-12  are intact. No cranial nerve deficit.      Sensory: No sensory deficit.      Motor: Motor function is intact. No weakness.      Gait: Gait is intact.      Deep Tendon Reflexes: Reflexes normal.   Psychiatric:         Mood and Affect: Mood normal. Mood is not anxious or depressed. Affect is not angry.         Behavior: Behavior is not agitated.         Thought Content: Thought content normal.         Judgment: Judgment normal.     LAB WORK: Laboratory studies reviewed.    Assessment/Plan  Problem List Items Addressed This Visit             ICD-10-CM       Advance Directives and General Issues    At risk for falls Z91.81       Cardiac and Vasculature    Atrial fibrillation (Multi) I48.91    Benign hypertension I10    Congestive heart failure - Primary I50.9       Gastrointestinal and Abdominal    Gastroesophageal reflux disease without esophagitis K21.9       Mental Health    Depression F32.A     Other Visit Diagnoses         Codes    Chronic obstructive pulmonary disease, unspecified COPD type (Multi)     J44.9    Type 2 diabetes mellitus without complication, with long-term current use of insulin (Multi)     E11.9, Z79.4    Weakness     R53.1        1. Heart failure, on diuretic.  2. COPD, on bronchodilator therapy.  3. Diabetes, on insulin.  4. Atrial fibrillation, rate controlled.  5. Hypertension, med controlled.  6. Depression, on medication.  7. GERD, on PPI.  8. Weakness, on PT/OT.  9. Fall risk, on fall precautions.    Scribe Attestation  By signing my name below, ILauren Scribe attest that this documentation has been prepared under the direction and in the presence of Santiago Hernandez MD.       All medical record entries made by the scribe were personally dictated by me I have reviewed the chart and agree the record accurately reflects my personal performance of his history physical examination and management      Electronically Signed By: Santiago Hernandez MD   11/5/24 11:11 PM

## 2024-11-05 VITALS
DIASTOLIC BLOOD PRESSURE: 78 MMHG | RESPIRATION RATE: 18 BRPM | TEMPERATURE: 98.2 F | HEART RATE: 76 BPM | SYSTOLIC BLOOD PRESSURE: 128 MMHG

## 2024-11-05 ASSESSMENT — ENCOUNTER SYMPTOMS
CHILLS: 0
FEVER: 0

## 2024-11-05 NOTE — PROGRESS NOTES
PLACE OF SERVICE:  [facility].    This is a subsequent visit.    Subjective   Patient ID: Lucas Valderrama is a 74 y.o. male who presents for Follow-up.    Mr. Lucas Valderrama is a 74-year-old male with heart failure and COPD.  He is a diabetic and unable to care for himself.  He requires supportive care.    Review of Systems   Constitutional:  Negative for chills and fever.   Cardiovascular:  Negative for chest pain.   All other systems reviewed and are negative.    Objective   /78   Pulse 76   Temp 36.8 °C (98.2 °F)   Resp 18     Physical Exam  Vitals reviewed.   Constitutional:       Comments: This is a well-developed, well-nourished male, lying in bed, appearing weak.   HENT:      Right Ear: Tympanic membrane, ear canal and external ear normal.      Left Ear: Tympanic membrane, ear canal and external ear normal.   Eyes:      General: No scleral icterus.     Pupils: Pupils are equal, round, and reactive to light.   Neck:      Vascular: No carotid bruit.   Cardiovascular:      Heart sounds: Normal heart sounds, S1 normal and S2 normal. No murmur heard.     No friction rub.   Pulmonary:      Effort: Pulmonary effort is normal.      Breath sounds: Decreased breath sounds (throughout) present.   Abdominal:      Palpations: There is no hepatomegaly, splenomegaly or mass.   Musculoskeletal:         General: No swelling or deformity. Normal range of motion.      Cervical back: Neck supple.      Right lower leg: Edema present.      Left lower leg: Edema present.   Lymphadenopathy:      Cervical: No cervical adenopathy.      Upper Body:      Right upper body: No axillary adenopathy.      Left upper body: No axillary adenopathy.      Lower Body: No right inguinal adenopathy. No left inguinal adenopathy.   Neurological:      Mental Status: He is oriented to person, place, and time. He is lethargic.      Cranial Nerves: Cranial nerves 2-12 are intact. No cranial nerve deficit.      Sensory: No sensory  deficit.      Motor: Motor function is intact. No weakness.      Gait: Gait is intact.      Deep Tendon Reflexes: Reflexes normal.   Psychiatric:         Mood and Affect: Mood normal. Mood is not anxious or depressed. Affect is not angry.         Behavior: Behavior is not agitated.         Thought Content: Thought content normal.         Judgment: Judgment normal.     LAB WORK: Laboratory studies reviewed.    Assessment/Plan   Problem List Items Addressed This Visit             ICD-10-CM       Advance Directives and General Issues    At risk for falls Z91.81       Cardiac and Vasculature    Atrial fibrillation (Multi) I48.91    Benign hypertension I10    Congestive heart failure - Primary I50.9       Gastrointestinal and Abdominal    Gastroesophageal reflux disease without esophagitis K21.9       Mental Health    Depression F32.A     Other Visit Diagnoses         Codes    Chronic obstructive pulmonary disease, unspecified COPD type (Multi)     J44.9    Type 2 diabetes mellitus without complication, with long-term current use of insulin (Multi)     E11.9, Z79.4    Weakness     R53.1        1. Heart failure, on diuretic.  2. COPD, on bronchodilator therapy.  3. Diabetes, on insulin.  4. Atrial fibrillation, rate controlled.  5. Hypertension, med controlled.  6. Depression, on medication.  7. GERD, on PPI.  8. Weakness, on PT/OT.  9. Fall risk, on fall precautions.    Scribe Attestation  By signing my name below, I, Keshav Simmons attest that this documentation has been prepared under the direction and in the presence of Santiago Hernandez MD.       All medical record entries made by the scribe were personally dictated by me I have reviewed the chart and agree the record accurately reflects my personal performance of his history physical examination and management

## 2024-12-09 ENCOUNTER — NURSING HOME VISIT (OUTPATIENT)
Dept: POST ACUTE CARE | Facility: EXTERNAL LOCATION | Age: 74
End: 2024-12-09
Payer: MEDICARE

## 2024-12-09 DIAGNOSIS — K21.9 GASTROESOPHAGEAL REFLUX DISEASE WITHOUT ESOPHAGITIS: ICD-10-CM

## 2024-12-09 DIAGNOSIS — Z79.4 TYPE 2 DIABETES MELLITUS WITHOUT COMPLICATION, WITH LONG-TERM CURRENT USE OF INSULIN (MULTI): ICD-10-CM

## 2024-12-09 DIAGNOSIS — R41.89 COGNITIVE DECLINE: ICD-10-CM

## 2024-12-09 DIAGNOSIS — I50.9 CONGESTIVE HEART FAILURE, UNSPECIFIED HF CHRONICITY, UNSPECIFIED HEART FAILURE TYPE: Primary | ICD-10-CM

## 2024-12-09 DIAGNOSIS — E11.9 TYPE 2 DIABETES MELLITUS WITHOUT COMPLICATION, WITH LONG-TERM CURRENT USE OF INSULIN (MULTI): ICD-10-CM

## 2024-12-09 DIAGNOSIS — R53.1 WEAKNESS: ICD-10-CM

## 2024-12-09 DIAGNOSIS — Z91.81 AT RISK FOR FALLS: ICD-10-CM

## 2024-12-09 DIAGNOSIS — F32.A DEPRESSION, UNSPECIFIED DEPRESSION TYPE: ICD-10-CM

## 2024-12-09 DIAGNOSIS — I48.91 ATRIAL FIBRILLATION, UNSPECIFIED TYPE (MULTI): ICD-10-CM

## 2024-12-09 DIAGNOSIS — I10 BENIGN HYPERTENSION: ICD-10-CM

## 2024-12-09 DIAGNOSIS — J44.9 CHRONIC OBSTRUCTIVE PULMONARY DISEASE, UNSPECIFIED COPD TYPE (MULTI): ICD-10-CM

## 2024-12-09 PROCEDURE — 99309 SBSQ NF CARE MODERATE MDM 30: CPT | Performed by: INTERNAL MEDICINE

## 2024-12-09 NOTE — LETTER
Patient: Lucas Valderrama  : 1950    Encounter Date: 2024    PLACE OF SERVICE:  [facility]    This is a subsequent visit.    Subjective  Patient ID: Lucas Valderrama is a 74 y.o. male who presents for Follow-up.    Mr. Lucas Valderrama is a 74-year-old male with history of COPD and heart failure.  He is unable to care for himself and manage his affairs.  He requires supportive care.    Review of Systems   Constitutional:  Negative for chills and fever.   Cardiovascular:  Negative for chest pain.   All other systems reviewed and are negative.    Objective  /82   Pulse 84   Temp 36.6 °C (97.9 °F)   Resp 18     Physical Exam  Vitals reviewed.   Constitutional:       Comments: This is a well-developed, well-nourished male, lying in bed, appearing weak    HENT:      Right Ear: Tympanic membrane, ear canal and external ear normal.      Left Ear: Tympanic membrane, ear canal and external ear normal.   Eyes:      General: No scleral icterus.     Pupils: Pupils are equal, round, and reactive to light.   Neck:      Vascular: No carotid bruit.   Cardiovascular:      Heart sounds: Normal heart sounds, S1 normal and S2 normal. No murmur heard.     No friction rub.   Pulmonary:      Breath sounds: Decreased breath sounds (throughout) present.   Abdominal:      Palpations: There is no hepatomegaly, splenomegaly or mass.   Musculoskeletal:         General: No swelling or deformity. Normal range of motion.      Cervical back: Neck supple.      Right lower leg: Edema present.      Left lower leg: Edema present.   Lymphadenopathy:      Cervical: No cervical adenopathy.      Upper Body:      Right upper body: No axillary adenopathy.      Left upper body: No axillary adenopathy.      Lower Body: No right inguinal adenopathy. No left inguinal adenopathy.   Neurological:      Mental Status: He is oriented to person, place, and time. He is lethargic.      Cranial Nerves: Cranial nerves 2-12 are intact. No cranial  nerve deficit.      Sensory: No sensory deficit.      Motor: Motor function is intact. No weakness.      Gait: Gait is intact.      Deep Tendon Reflexes: Reflexes normal.   Psychiatric:         Mood and Affect: Mood normal. Mood is not anxious or depressed. Affect is not angry.         Behavior: Behavior is not agitated.         Thought Content: Thought content normal.         Judgment: Judgment normal.     LAB WORK: Laboratory studies reviewed.    Assessment/Plan  Problem List Items Addressed This Visit             ICD-10-CM       Advance Directives and General Issues    At risk for falls Z91.81       Cardiac and Vasculature    Atrial fibrillation (Multi) I48.91    Benign hypertension I10    Congestive heart failure - Primary I50.9       Gastrointestinal and Abdominal    Gastroesophageal reflux disease without esophagitis K21.9       Mental Health    Depression F32.A     Other Visit Diagnoses         Codes    Chronic obstructive pulmonary disease, unspecified COPD type (Multi)     J44.9    Type 2 diabetes mellitus without complication, with long-term current use of insulin (Multi)     E11.9, Z79.4    Cognitive decline     R41.89    Weakness     R53.1        1. Heart failure, on diuretic.  2. COPD, on bronchodilator therapy.  3. Diabetes, on insulin.  4. Atrial fibrillation, rate controlled.  5. Hypertension, med controlled.  6. Depression, on medication.  7. GERD, on PPI.  8. Cognitive decline, supportive care.  9. Weakness, on PT/OT.  10. Fall risk, on fall precautions.    Scribe Attestation  By signing my name below, IMarion Scribe attest that this documentation has been prepared under the direction and in the presence of Santiago Hernandez MD.     All medical record entries made by the scribe were personally dictated by me I have reviewed the chart and agree the record accurately reflects my personal performance of his history physical examination and management      Electronically Signed By: Santiago  MD David   12/15/24 10:48 PM

## 2024-12-14 VITALS
RESPIRATION RATE: 18 BRPM | SYSTOLIC BLOOD PRESSURE: 128 MMHG | HEART RATE: 84 BPM | DIASTOLIC BLOOD PRESSURE: 82 MMHG | TEMPERATURE: 97.9 F

## 2024-12-14 ASSESSMENT — ENCOUNTER SYMPTOMS
CHILLS: 0
FEVER: 0

## 2024-12-14 NOTE — PROGRESS NOTES
PLACE OF SERVICE:  [facility]    This is a subsequent visit.    Subjective   Patient ID: Lucas Valderrama is a 74 y.o. male who presents for Follow-up.    Mr. Lucas Valderrama is a 74-year-old male with history of COPD and heart failure.  He is unable to care for himself and manage his affairs.  He requires supportive care.    Review of Systems   Constitutional:  Negative for chills and fever.   Cardiovascular:  Negative for chest pain.   All other systems reviewed and are negative.    Objective   /82   Pulse 84   Temp 36.6 °C (97.9 °F)   Resp 18     Physical Exam  Vitals reviewed.   Constitutional:       Comments: This is a well-developed, well-nourished male, lying in bed, appearing weak    HENT:      Right Ear: Tympanic membrane, ear canal and external ear normal.      Left Ear: Tympanic membrane, ear canal and external ear normal.   Eyes:      General: No scleral icterus.     Pupils: Pupils are equal, round, and reactive to light.   Neck:      Vascular: No carotid bruit.   Cardiovascular:      Heart sounds: Normal heart sounds, S1 normal and S2 normal. No murmur heard.     No friction rub.   Pulmonary:      Breath sounds: Decreased breath sounds (throughout) present.   Abdominal:      Palpations: There is no hepatomegaly, splenomegaly or mass.   Musculoskeletal:         General: No swelling or deformity. Normal range of motion.      Cervical back: Neck supple.      Right lower leg: Edema present.      Left lower leg: Edema present.   Lymphadenopathy:      Cervical: No cervical adenopathy.      Upper Body:      Right upper body: No axillary adenopathy.      Left upper body: No axillary adenopathy.      Lower Body: No right inguinal adenopathy. No left inguinal adenopathy.   Neurological:      Mental Status: He is oriented to person, place, and time. He is lethargic.      Cranial Nerves: Cranial nerves 2-12 are intact. No cranial nerve deficit.      Sensory: No sensory deficit.      Motor: Motor  function is intact. No weakness.      Gait: Gait is intact.      Deep Tendon Reflexes: Reflexes normal.   Psychiatric:         Mood and Affect: Mood normal. Mood is not anxious or depressed. Affect is not angry.         Behavior: Behavior is not agitated.         Thought Content: Thought content normal.         Judgment: Judgment normal.     LAB WORK: Laboratory studies reviewed.    Assessment/Plan   Problem List Items Addressed This Visit             ICD-10-CM       Advance Directives and General Issues    At risk for falls Z91.81       Cardiac and Vasculature    Atrial fibrillation (Multi) I48.91    Benign hypertension I10    Congestive heart failure - Primary I50.9       Gastrointestinal and Abdominal    Gastroesophageal reflux disease without esophagitis K21.9       Mental Health    Depression F32.A     Other Visit Diagnoses         Codes    Chronic obstructive pulmonary disease, unspecified COPD type (Multi)     J44.9    Type 2 diabetes mellitus without complication, with long-term current use of insulin (Multi)     E11.9, Z79.4    Cognitive decline     R41.89    Weakness     R53.1        1. Heart failure, on diuretic.  2. COPD, on bronchodilator therapy.  3. Diabetes, on insulin.  4. Atrial fibrillation, rate controlled.  5. Hypertension, med controlled.  6. Depression, on medication.  7. GERD, on PPI.  8. Cognitive decline, supportive care.  9. Weakness, on PT/OT.  10. Fall risk, on fall precautions.    Scribe Attestation  By signing my name below, I, Keshav Bonilla attest that this documentation has been prepared under the direction and in the presence of Santiago Hernandez MD.     All medical record entries made by the scribe were personally dictated by me I have reviewed the chart and agree the record accurately reflects my personal performance of his history physical examination and management

## 2025-01-06 ENCOUNTER — NURSING HOME VISIT (OUTPATIENT)
Dept: POST ACUTE CARE | Facility: EXTERNAL LOCATION | Age: 75
End: 2025-01-06
Payer: MEDICARE

## 2025-01-06 DIAGNOSIS — E66.01 OBESITY, MORBID (MULTI): ICD-10-CM

## 2025-01-06 DIAGNOSIS — Z79.4 TYPE 2 DIABETES MELLITUS WITHOUT COMPLICATION, WITH LONG-TERM CURRENT USE OF INSULIN (MULTI): ICD-10-CM

## 2025-01-06 DIAGNOSIS — I10 BENIGN HYPERTENSION: ICD-10-CM

## 2025-01-06 DIAGNOSIS — R41.89 COGNITIVE DECLINE: ICD-10-CM

## 2025-01-06 DIAGNOSIS — K21.9 GASTROESOPHAGEAL REFLUX DISEASE WITHOUT ESOPHAGITIS: ICD-10-CM

## 2025-01-06 DIAGNOSIS — J44.9 CHRONIC OBSTRUCTIVE PULMONARY DISEASE, UNSPECIFIED COPD TYPE (MULTI): Primary | ICD-10-CM

## 2025-01-06 DIAGNOSIS — F32.A DEPRESSION, UNSPECIFIED DEPRESSION TYPE: ICD-10-CM

## 2025-01-06 DIAGNOSIS — I48.91 ATRIAL FIBRILLATION, UNSPECIFIED TYPE (MULTI): ICD-10-CM

## 2025-01-06 DIAGNOSIS — Z91.81 AT RISK FOR FALLS: ICD-10-CM

## 2025-01-06 DIAGNOSIS — E11.9 TYPE 2 DIABETES MELLITUS WITHOUT COMPLICATION, WITH LONG-TERM CURRENT USE OF INSULIN (MULTI): ICD-10-CM

## 2025-01-06 DIAGNOSIS — I50.9 CONGESTIVE HEART FAILURE, UNSPECIFIED HF CHRONICITY, UNSPECIFIED HEART FAILURE TYPE: ICD-10-CM

## 2025-01-06 PROCEDURE — 99308 SBSQ NF CARE LOW MDM 20: CPT | Performed by: INTERNAL MEDICINE

## 2025-01-06 NOTE — LETTER
Patient: Lucas Valderrama  : 1950    Encounter Date: 2025    PLACE OF SERVICE:  [facility]    This is a subsequent visit.    Subjective  Patient ID: Lucas Valderrama is a 74 y.o. male who presents for Follow-up.    Mr. Lucas Valderrama is a 74-year-old male with history of diabetes.  He suffers from COPD and heart failure.  He is unable to care for himself and requires supportive care.    Review of Systems   Constitutional:  Negative for chills and fever.   Cardiovascular:  Negative for chest pain.   All other systems reviewed and are negative.    Objective  /78   Pulse 76   Temp 36.6 °C (97.9 °F)   Resp 18     Physical Exam  Vitals reviewed.   Constitutional:       Comments: This is a well-developed, well-nourished male, lying in bed, appearing weak.   HENT:      Right Ear: Tympanic membrane, ear canal and external ear normal.      Left Ear: Tympanic membrane, ear canal and external ear normal.   Eyes:      General: No scleral icterus.     Pupils: Pupils are equal, round, and reactive to light.   Neck:      Vascular: No carotid bruit.   Cardiovascular:      Heart sounds: Normal heart sounds, S1 normal and S2 normal. No murmur heard.     No friction rub.   Pulmonary:      Breath sounds: Decreased breath sounds (throughout) present.   Abdominal:      Palpations: There is no hepatomegaly, splenomegaly or mass.   Musculoskeletal:         General: No swelling or deformity. Normal range of motion.      Cervical back: Neck supple.      Right lower leg: Edema present.      Left lower leg: Edema present.   Lymphadenopathy:      Cervical: No cervical adenopathy.      Upper Body:      Right upper body: No axillary adenopathy.      Left upper body: No axillary adenopathy.      Lower Body: No right inguinal adenopathy. No left inguinal adenopathy.   Neurological:      Mental Status: He is oriented to person, place, and time. He is lethargic.      Cranial Nerves: Cranial nerves 2-12 are intact. No  cranial nerve deficit.      Sensory: No sensory deficit.      Motor: Motor function is intact. No weakness.      Gait: Gait is intact.      Deep Tendon Reflexes: Reflexes normal.   Psychiatric:         Mood and Affect: Mood normal. Mood is not anxious or depressed. Affect is not angry.         Behavior: Behavior is not agitated.         Thought Content: Thought content normal.         Judgment: Judgment normal.     LAB WORK: Laboratory studies were reviewed.    Assessment/Plan  Problem List Items Addressed This Visit             ICD-10-CM       Advance Directives and General Issues    At risk for falls Z91.81       Cardiac and Vasculature    Atrial fibrillation (Multi) I48.91    Benign hypertension I10    Congestive heart failure I50.9       Gastrointestinal and Abdominal    Gastroesophageal reflux disease without esophagitis K21.9       Mental Health    Depression F32.A     Other Visit Diagnoses         Codes    Chronic obstructive pulmonary disease, unspecified COPD type (Multi)    -  Primary J44.9    Type 2 diabetes mellitus without complication, with long-term current use of insulin (Multi)     E11.9, Z79.4    Cognitive decline     R41.89        1. COPD, on bronchodilator therapy.  2. Heart failure, on diuretic.  3. Atrial fibrillation, rate controlled.  4. Hypertension, med controlled.  5. Diabetes, on insulin.  6. Cognitive decline, on supportive care.  7. Depression, on medication.  8. GERD, on PPI.  9. Fall risk, on fall precautions.    Scribe Attestation  By signing my name below, I, Keshav Bonilla attest that this documentation has been prepared under the direction and in the presence of Santiago Hernandez MD.     All medical record entries made by the scribe were personally dictated by me I have reviewed the chart and agree the record accurately reflects my personal performance of his history physical examination and management      Electronically Signed By: Santiago Hernandez MD   1/16/25 11:07 PM

## 2025-01-13 VITALS
RESPIRATION RATE: 18 BRPM | DIASTOLIC BLOOD PRESSURE: 78 MMHG | HEART RATE: 76 BPM | SYSTOLIC BLOOD PRESSURE: 124 MMHG | TEMPERATURE: 97.9 F

## 2025-01-13 ASSESSMENT — ENCOUNTER SYMPTOMS
CHILLS: 0
FEVER: 0

## 2025-01-13 NOTE — PROGRESS NOTES
PLACE OF SERVICE:  [facility]    This is a subsequent visit.    Subjective   Patient ID: Lucas Valderrama is a 74 y.o. male who presents for Follow-up.    Mr. Lucas Valderrama is a 74-year-old male with history of diabetes.  He suffers from COPD and heart failure.  He is unable to care for himself and requires supportive care.    Review of Systems   Constitutional:  Negative for chills and fever.   Cardiovascular:  Negative for chest pain.   All other systems reviewed and are negative.    Objective   /78   Pulse 76   Temp 36.6 °C (97.9 °F)   Resp 18     Physical Exam  Vitals reviewed.   Constitutional:       Comments: This is a well-developed, well-nourished male, lying in bed, appearing weak.   HENT:      Right Ear: Tympanic membrane, ear canal and external ear normal.      Left Ear: Tympanic membrane, ear canal and external ear normal.   Eyes:      General: No scleral icterus.     Pupils: Pupils are equal, round, and reactive to light.   Neck:      Vascular: No carotid bruit.   Cardiovascular:      Heart sounds: Normal heart sounds, S1 normal and S2 normal. No murmur heard.     No friction rub.   Pulmonary:      Breath sounds: Decreased breath sounds (throughout) present.   Abdominal:      Palpations: There is no hepatomegaly, splenomegaly or mass.   Musculoskeletal:         General: No swelling or deformity. Normal range of motion.      Cervical back: Neck supple.      Right lower leg: Edema present.      Left lower leg: Edema present.   Lymphadenopathy:      Cervical: No cervical adenopathy.      Upper Body:      Right upper body: No axillary adenopathy.      Left upper body: No axillary adenopathy.      Lower Body: No right inguinal adenopathy. No left inguinal adenopathy.   Neurological:      Mental Status: He is oriented to person, place, and time. He is lethargic.      Cranial Nerves: Cranial nerves 2-12 are intact. No cranial nerve deficit.      Sensory: No sensory deficit.      Motor: Motor  function is intact. No weakness.      Gait: Gait is intact.      Deep Tendon Reflexes: Reflexes normal.   Psychiatric:         Mood and Affect: Mood normal. Mood is not anxious or depressed. Affect is not angry.         Behavior: Behavior is not agitated.         Thought Content: Thought content normal.         Judgment: Judgment normal.     LAB WORK: Laboratory studies were reviewed.    Assessment/Plan   Problem List Items Addressed This Visit             ICD-10-CM       Advance Directives and General Issues    At risk for falls Z91.81       Cardiac and Vasculature    Atrial fibrillation (Multi) I48.91    Benign hypertension I10    Congestive heart failure I50.9       Gastrointestinal and Abdominal    Gastroesophageal reflux disease without esophagitis K21.9       Mental Health    Depression F32.A     Other Visit Diagnoses         Codes    Chronic obstructive pulmonary disease, unspecified COPD type (Multi)    -  Primary J44.9    Type 2 diabetes mellitus without complication, with long-term current use of insulin (Multi)     E11.9, Z79.4    Cognitive decline     R41.89        1. COPD, on bronchodilator therapy.  2. Heart failure, on diuretic.  3. Atrial fibrillation, rate controlled.  4. Hypertension, med controlled.  5. Diabetes, on insulin.  6. Cognitive decline, on supportive care.  7. Depression, on medication.  8. GERD, on PPI.  9. Fall risk, on fall precautions.    Scribe Attestation  By signing my name below, IMarion Scribe attest that this documentation has been prepared under the direction and in the presence of Santiago Hernandez MD.     All medical record entries made by the scribe were personally dictated by me I have reviewed the chart and agree the record accurately reflects my personal performance of his history physical examination and management

## 2025-01-18 ENCOUNTER — NURSING HOME VISIT (OUTPATIENT)
Dept: POST ACUTE CARE | Facility: EXTERNAL LOCATION | Age: 75
End: 2025-01-18
Payer: MEDICARE

## 2025-01-18 DIAGNOSIS — Z79.4 TYPE 2 DIABETES MELLITUS WITHOUT COMPLICATION, WITH LONG-TERM CURRENT USE OF INSULIN (MULTI): ICD-10-CM

## 2025-01-18 DIAGNOSIS — J44.9 CHRONIC OBSTRUCTIVE PULMONARY DISEASE, UNSPECIFIED COPD TYPE (MULTI): Primary | ICD-10-CM

## 2025-01-18 DIAGNOSIS — I50.9 CONGESTIVE HEART FAILURE, UNSPECIFIED HF CHRONICITY, UNSPECIFIED HEART FAILURE TYPE: ICD-10-CM

## 2025-01-18 DIAGNOSIS — R41.89 COGNITIVE DECLINE: ICD-10-CM

## 2025-01-18 DIAGNOSIS — R53.1 WEAKNESS: ICD-10-CM

## 2025-01-18 DIAGNOSIS — F41.9 ANXIETY: ICD-10-CM

## 2025-01-18 DIAGNOSIS — F32.A DEPRESSION, UNSPECIFIED DEPRESSION TYPE: ICD-10-CM

## 2025-01-18 DIAGNOSIS — I48.91 ATRIAL FIBRILLATION, UNSPECIFIED TYPE (MULTI): ICD-10-CM

## 2025-01-18 DIAGNOSIS — K21.9 GASTROESOPHAGEAL REFLUX DISEASE WITHOUT ESOPHAGITIS: ICD-10-CM

## 2025-01-18 DIAGNOSIS — I10 BENIGN HYPERTENSION: ICD-10-CM

## 2025-01-18 DIAGNOSIS — E11.9 TYPE 2 DIABETES MELLITUS WITHOUT COMPLICATION, WITH LONG-TERM CURRENT USE OF INSULIN (MULTI): ICD-10-CM

## 2025-01-18 PROCEDURE — 99308 SBSQ NF CARE LOW MDM 20: CPT | Performed by: INTERNAL MEDICINE

## 2025-01-18 NOTE — LETTER
Patient: Lucas Valderrama  : 1950    Encounter Date: 2025    PLACE OF SERVICE:  [facility]    This is a subsequent visit.    Subjective  Patient ID: Lucas Valderrama is a 74 y.o. male who presents for Follow-up.    Mr. Lucas Valderrama is a 74-year-old male with history of COPD and heart failure.  He is unable to care for himself and requires supportive care.    Review of Systems   Constitutional:  Negative for chills and fever.   Cardiovascular:  Negative for chest pain.   All other systems reviewed and are negative.    Objective  /82   Pulse 78   Temp 36.7 °C (98.1 °F)   Resp 18     Physical Exam  Vitals reviewed.   Constitutional:       Comments: This is a well-developed, well-nourished male, lying in bed, appearing weak.   HENT:      Right Ear: Tympanic membrane, ear canal and external ear normal.      Left Ear: Tympanic membrane, ear canal and external ear normal.   Eyes:      General: No scleral icterus.     Pupils: Pupils are equal, round, and reactive to light.   Neck:      Vascular: No carotid bruit.   Cardiovascular:      Heart sounds: Normal heart sounds, S1 normal and S2 normal. No murmur heard.     No friction rub.   Pulmonary:      Breath sounds: Decreased breath sounds (throughout) present.   Abdominal:      Palpations: There is no hepatomegaly, splenomegaly or mass.   Musculoskeletal:         General: No swelling or deformity. Normal range of motion.      Cervical back: Neck supple.      Right lower leg: Edema present.      Left lower leg: Edema present.   Lymphadenopathy:      Cervical: No cervical adenopathy.      Upper Body:      Right upper body: No axillary adenopathy.      Left upper body: No axillary adenopathy.      Lower Body: No right inguinal adenopathy. No left inguinal adenopathy.   Neurological:      Mental Status: He is oriented to person, place, and time. He is lethargic.      Cranial Nerves: Cranial nerves 2-12 are intact. No cranial nerve deficit.       Sensory: No sensory deficit.      Motor: Motor function is intact. No weakness.      Gait: Gait is intact.      Deep Tendon Reflexes: Reflexes normal.   Psychiatric:         Mood and Affect: Mood normal. Mood is not anxious or depressed. Affect is not angry.         Behavior: Behavior is not agitated.         Thought Content: Thought content normal.         Judgment: Judgment normal.     LAB WORK:  Laboratory studies were reviewed.    Assessment/Plan  Problem List Items Addressed This Visit             ICD-10-CM       Cardiac and Vasculature    Atrial fibrillation (Multi) I48.91    Benign hypertension I10    Congestive heart failure I50.9       Gastrointestinal and Abdominal    Gastroesophageal reflux disease without esophagitis K21.9       Mental Health    Depression F32.A    Anxiety F41.9     Other Visit Diagnoses         Codes    Chronic obstructive pulmonary disease, unspecified COPD type (Multi)    -  Primary J44.9    Type 2 diabetes mellitus without complication, with long-term current use of insulin (Multi)     E11.9, Z79.4    Cognitive decline     R41.89    Weakness     R53.1        1. COPD, on bronchodilator therapy.  2. Congestive heart failure, on diuretic.  3. Diabetes, on insulin.  4. Hypertension, med controlled.  5. Atrial fibrillation, rate controlled.  6. Depression, on medication.  7. Anxiety, on medication.  8. Cognitive decline, on supportive care.  9. GERD, on PPI.  10. Weakness, on occupational therapy.    Scribe Attestation  By signing my name below, I, Keshav Bonilla attest that this documentation has been prepared under the direction and in the presence of Santiago Hernandez MD.     All medical record entries made by the scribe were personally dictated by me I have reviewed the chart and agree the record accurately reflects my personal performance of his history physical examination and management      Electronically Signed By: Santiago Hernandez MD   1/20/25  9:02 PM

## 2025-01-20 VITALS
TEMPERATURE: 98.1 F | DIASTOLIC BLOOD PRESSURE: 82 MMHG | SYSTOLIC BLOOD PRESSURE: 126 MMHG | RESPIRATION RATE: 18 BRPM | HEART RATE: 78 BPM

## 2025-01-20 ASSESSMENT — ENCOUNTER SYMPTOMS
CHILLS: 0
FEVER: 0

## 2025-01-20 NOTE — PROGRESS NOTES
PLACE OF SERVICE:  [facility]    This is a subsequent visit.    Subjective   Patient ID: Lucas Valderrama is a 74 y.o. male who presents for Follow-up.    Mr. Lucas Valderrama is a 74-year-old male with history of COPD and heart failure.  He is unable to care for himself and requires supportive care.    Review of Systems   Constitutional:  Negative for chills and fever.   Cardiovascular:  Negative for chest pain.   All other systems reviewed and are negative.    Objective   /82   Pulse 78   Temp 36.7 °C (98.1 °F)   Resp 18     Physical Exam  Vitals reviewed.   Constitutional:       Comments: This is a well-developed, well-nourished male, lying in bed, appearing weak.   HENT:      Right Ear: Tympanic membrane, ear canal and external ear normal.      Left Ear: Tympanic membrane, ear canal and external ear normal.   Eyes:      General: No scleral icterus.     Pupils: Pupils are equal, round, and reactive to light.   Neck:      Vascular: No carotid bruit.   Cardiovascular:      Heart sounds: Normal heart sounds, S1 normal and S2 normal. No murmur heard.     No friction rub.   Pulmonary:      Breath sounds: Decreased breath sounds (throughout) present.   Abdominal:      Palpations: There is no hepatomegaly, splenomegaly or mass.   Musculoskeletal:         General: No swelling or deformity. Normal range of motion.      Cervical back: Neck supple.      Right lower leg: Edema present.      Left lower leg: Edema present.   Lymphadenopathy:      Cervical: No cervical adenopathy.      Upper Body:      Right upper body: No axillary adenopathy.      Left upper body: No axillary adenopathy.      Lower Body: No right inguinal adenopathy. No left inguinal adenopathy.   Neurological:      Mental Status: He is oriented to person, place, and time. He is lethargic.      Cranial Nerves: Cranial nerves 2-12 are intact. No cranial nerve deficit.      Sensory: No sensory deficit.      Motor: Motor function is intact. No  weakness.      Gait: Gait is intact.      Deep Tendon Reflexes: Reflexes normal.   Psychiatric:         Mood and Affect: Mood normal. Mood is not anxious or depressed. Affect is not angry.         Behavior: Behavior is not agitated.         Thought Content: Thought content normal.         Judgment: Judgment normal.     LAB WORK:  Laboratory studies were reviewed.    Assessment/Plan   Problem List Items Addressed This Visit             ICD-10-CM       Cardiac and Vasculature    Atrial fibrillation (Multi) I48.91    Benign hypertension I10    Congestive heart failure I50.9       Gastrointestinal and Abdominal    Gastroesophageal reflux disease without esophagitis K21.9       Mental Health    Depression F32.A    Anxiety F41.9     Other Visit Diagnoses         Codes    Chronic obstructive pulmonary disease, unspecified COPD type (Multi)    -  Primary J44.9    Type 2 diabetes mellitus without complication, with long-term current use of insulin (Multi)     E11.9, Z79.4    Cognitive decline     R41.89    Weakness     R53.1        1. COPD, on bronchodilator therapy.  2. Congestive heart failure, on diuretic.  3. Diabetes, on insulin.  4. Hypertension, med controlled.  5. Atrial fibrillation, rate controlled.  6. Depression, on medication.  7. Anxiety, on medication.  8. Cognitive decline, on supportive care.  9. GERD, on PPI.  10. Weakness, on occupational therapy.    Scribe Attestation  By signing my name below, I, Keshav Bonilla attest that this documentation has been prepared under the direction and in the presence of Santiago Hernandez MD.     All medical record entries made by the scribe were personally dictated by me I have reviewed the chart and agree the record accurately reflects my personal performance of his history physical examination and management

## 2025-02-09 ENCOUNTER — NURSING HOME VISIT (OUTPATIENT)
Dept: POST ACUTE CARE | Facility: EXTERNAL LOCATION | Age: 75
End: 2025-02-09
Payer: MEDICARE

## 2025-02-09 DIAGNOSIS — K21.9 GASTROESOPHAGEAL REFLUX DISEASE WITHOUT ESOPHAGITIS: ICD-10-CM

## 2025-02-09 DIAGNOSIS — I10 BENIGN HYPERTENSION: ICD-10-CM

## 2025-02-09 DIAGNOSIS — Z79.4 TYPE 2 DIABETES MELLITUS WITHOUT COMPLICATION, WITH LONG-TERM CURRENT USE OF INSULIN (MULTI): ICD-10-CM

## 2025-02-09 DIAGNOSIS — R41.89 COGNITIVE DECLINE: ICD-10-CM

## 2025-02-09 DIAGNOSIS — I50.9 CONGESTIVE HEART FAILURE, UNSPECIFIED HF CHRONICITY, UNSPECIFIED HEART FAILURE TYPE: ICD-10-CM

## 2025-02-09 DIAGNOSIS — F32.A DEPRESSION, UNSPECIFIED DEPRESSION TYPE: ICD-10-CM

## 2025-02-09 DIAGNOSIS — J44.9 CHRONIC OBSTRUCTIVE PULMONARY DISEASE, UNSPECIFIED COPD TYPE (MULTI): Primary | ICD-10-CM

## 2025-02-09 DIAGNOSIS — R53.1 WEAKNESS: ICD-10-CM

## 2025-02-09 DIAGNOSIS — I48.91 ATRIAL FIBRILLATION, UNSPECIFIED TYPE (MULTI): ICD-10-CM

## 2025-02-09 DIAGNOSIS — E11.9 TYPE 2 DIABETES MELLITUS WITHOUT COMPLICATION, WITH LONG-TERM CURRENT USE OF INSULIN (MULTI): ICD-10-CM

## 2025-02-09 DIAGNOSIS — Z91.81 AT RISK FOR FALLS: ICD-10-CM

## 2025-02-09 NOTE — LETTER
Patient: Lucas Valderrama  : 1950    Encounter Date: 2025    PLACE OF SERVICE:  [facility]    This is a subsequent visit.    Subjective  Patient ID: Lucas Valderrama is a 74 y.o. male who presents for Follow-up.    Mr. Lucas Valderrama is a 74-year-old male with history of COPD and congestive heart failure.  He is unable to care for himself and manage his affairs.  He requires supportive care.    Review of Systems   Constitutional:  Negative for chills and fever.   Cardiovascular:  Negative for chest pain.   All other systems reviewed and are negative.    Objective  /80   Pulse 76   Temp 36.7 °C (98 °F)   Resp 18     Physical Exam  Vitals reviewed.   Constitutional:       Comments: This is a well-developed, well-nourished male, lying in bed, appearing weak.   HENT:      Right Ear: Tympanic membrane, ear canal and external ear normal.      Left Ear: Tympanic membrane, ear canal and external ear normal.   Eyes:      General: No scleral icterus.     Pupils: Pupils are equal, round, and reactive to light.   Neck:      Vascular: No carotid bruit.   Cardiovascular:      Heart sounds: Normal heart sounds, S1 normal and S2 normal. No murmur heard.     No friction rub.   Pulmonary:      Breath sounds: Decreased breath sounds (throughout) present.   Abdominal:      Palpations: There is no hepatomegaly, splenomegaly or mass.   Musculoskeletal:         General: No swelling or deformity. Normal range of motion.      Cervical back: Neck supple.      Right lower leg: Edema present.      Left lower leg: Edema present.   Lymphadenopathy:      Cervical: No cervical adenopathy.      Upper Body:      Right upper body: No axillary adenopathy.      Left upper body: No axillary adenopathy.      Lower Body: No right inguinal adenopathy. No left inguinal adenopathy.   Neurological:      Mental Status: He is oriented to person, place, and time. He is lethargic.      Cranial Nerves: Cranial nerves 2-12 are intact. No  cranial nerve deficit.      Sensory: No sensory deficit.      Motor: Motor function is intact. No weakness.      Gait: Gait is intact.      Deep Tendon Reflexes: Reflexes normal.   Psychiatric:         Mood and Affect: Mood normal. Mood is not anxious or depressed. Affect is not angry.         Behavior: Behavior is not agitated.         Thought Content: Thought content normal.         Judgment: Judgment normal.     LAB WORK: Laboratory studies reviewed.    Assessment/Plan  Problem List Items Addressed This Visit             ICD-10-CM       Advance Directives and General Issues    At risk for falls Z91.81       Cardiac and Vasculature    Atrial fibrillation (Multi) I48.91    Benign hypertension I10    Congestive heart failure I50.9       Gastrointestinal and Abdominal    Gastroesophageal reflux disease without esophagitis K21.9       Mental Health    Depression F32.A     Other Visit Diagnoses         Codes    Chronic obstructive pulmonary disease, unspecified COPD type (Multi)    -  Primary J44.9    Type 2 diabetes mellitus without complication, with long-term current use of insulin (Multi)     E11.9, Z79.4    Cognitive decline     R41.89    Weakness     R53.1        1. COPD, on bronchodilator therapy.  2. Congestive heart failure, on diuretic.  3. Atrial fibrillation, rate controlled.  4. Diabetes, on insulin.  5. Depression, on medication.  6. Hypertension, med controlled.  7. GERD, on PPI.  8. Cognitive decline, supportive care.  9. Weakness, on PT/OT.  10. Fall risk, on fall precautions.    Scribe Attestation  By signing my name below, Marion MARSH Scribe attest that this documentation has been prepared under the direction and in the presence of Santiago Hernandez MD.     All medical record entries made by the scribe were personally dictated by me I have reviewed the chart and agree the record accurately reflects my personal performance of his history physical examination and management      Electronically Signed  By: Santiago Hernandez MD   2/15/25 11:15 PM

## 2025-02-13 VITALS
SYSTOLIC BLOOD PRESSURE: 126 MMHG | RESPIRATION RATE: 18 BRPM | TEMPERATURE: 98 F | HEART RATE: 76 BPM | DIASTOLIC BLOOD PRESSURE: 80 MMHG

## 2025-02-13 ASSESSMENT — ENCOUNTER SYMPTOMS
FEVER: 0
CHILLS: 0

## 2025-02-13 NOTE — PROGRESS NOTES
PLACE OF SERVICE:  [facility]    This is a subsequent visit.    Subjective   Patient ID: Lucas Valderrama is a 74 y.o. male who presents for Follow-up.    Mr. Lucas Valderrama is a 74-year-old male with history of COPD and congestive heart failure.  He is unable to care for himself and manage his affairs.  He requires supportive care.    Review of Systems   Constitutional:  Negative for chills and fever.   Cardiovascular:  Negative for chest pain.   All other systems reviewed and are negative.    Objective   /80   Pulse 76   Temp 36.7 °C (98 °F)   Resp 18     Physical Exam  Vitals reviewed.   Constitutional:       Comments: This is a well-developed, well-nourished male, lying in bed, appearing weak.   HENT:      Right Ear: Tympanic membrane, ear canal and external ear normal.      Left Ear: Tympanic membrane, ear canal and external ear normal.   Eyes:      General: No scleral icterus.     Pupils: Pupils are equal, round, and reactive to light.   Neck:      Vascular: No carotid bruit.   Cardiovascular:      Heart sounds: Normal heart sounds, S1 normal and S2 normal. No murmur heard.     No friction rub.   Pulmonary:      Breath sounds: Decreased breath sounds (throughout) present.   Abdominal:      Palpations: There is no hepatomegaly, splenomegaly or mass.   Musculoskeletal:         General: No swelling or deformity. Normal range of motion.      Cervical back: Neck supple.      Right lower leg: Edema present.      Left lower leg: Edema present.   Lymphadenopathy:      Cervical: No cervical adenopathy.      Upper Body:      Right upper body: No axillary adenopathy.      Left upper body: No axillary adenopathy.      Lower Body: No right inguinal adenopathy. No left inguinal adenopathy.   Neurological:      Mental Status: He is oriented to person, place, and time. He is lethargic.      Cranial Nerves: Cranial nerves 2-12 are intact. No cranial nerve deficit.      Sensory: No sensory deficit.      Motor:  Motor function is intact. No weakness.      Gait: Gait is intact.      Deep Tendon Reflexes: Reflexes normal.   Psychiatric:         Mood and Affect: Mood normal. Mood is not anxious or depressed. Affect is not angry.         Behavior: Behavior is not agitated.         Thought Content: Thought content normal.         Judgment: Judgment normal.     LAB WORK: Laboratory studies reviewed.    Assessment/Plan   Problem List Items Addressed This Visit             ICD-10-CM       Advance Directives and General Issues    At risk for falls Z91.81       Cardiac and Vasculature    Atrial fibrillation (Multi) I48.91    Benign hypertension I10    Congestive heart failure I50.9       Gastrointestinal and Abdominal    Gastroesophageal reflux disease without esophagitis K21.9       Mental Health    Depression F32.A     Other Visit Diagnoses         Codes    Chronic obstructive pulmonary disease, unspecified COPD type (Multi)    -  Primary J44.9    Type 2 diabetes mellitus without complication, with long-term current use of insulin (Multi)     E11.9, Z79.4    Cognitive decline     R41.89    Weakness     R53.1        1. COPD, on bronchodilator therapy.  2. Congestive heart failure, on diuretic.  3. Atrial fibrillation, rate controlled.  4. Diabetes, on insulin.  5. Depression, on medication.  6. Hypertension, med controlled.  7. GERD, on PPI.  8. Cognitive decline, supportive care.  9. Weakness, on PT/OT.  10. Fall risk, on fall precautions.    Scribe Attestation  By signing my name below, IMarion Scribe attest that this documentation has been prepared under the direction and in the presence of Santiago Hernandez MD.     All medical record entries made by the scribe were personally dictated by me I have reviewed the chart and agree the record accurately reflects my personal performance of his history physical examination and management

## 2025-02-16 ENCOUNTER — NURSING HOME VISIT (OUTPATIENT)
Dept: POST ACUTE CARE | Facility: EXTERNAL LOCATION | Age: 75
End: 2025-02-16
Payer: MEDICARE

## 2025-02-16 DIAGNOSIS — I10 BENIGN HYPERTENSION: ICD-10-CM

## 2025-02-16 DIAGNOSIS — Z79.4 TYPE 2 DIABETES MELLITUS WITHOUT COMPLICATION, WITH LONG-TERM CURRENT USE OF INSULIN (MULTI): ICD-10-CM

## 2025-02-16 DIAGNOSIS — E11.9 TYPE 2 DIABETES MELLITUS WITHOUT COMPLICATION, WITH LONG-TERM CURRENT USE OF INSULIN (MULTI): ICD-10-CM

## 2025-02-16 DIAGNOSIS — I48.91 ATRIAL FIBRILLATION, UNSPECIFIED TYPE (MULTI): ICD-10-CM

## 2025-02-16 DIAGNOSIS — R41.89 COGNITIVE DECLINE: ICD-10-CM

## 2025-02-16 DIAGNOSIS — F41.9 ANXIETY: ICD-10-CM

## 2025-02-16 DIAGNOSIS — I50.9 CONGESTIVE HEART FAILURE, UNSPECIFIED HF CHRONICITY, UNSPECIFIED HEART FAILURE TYPE: ICD-10-CM

## 2025-02-16 DIAGNOSIS — K21.9 GASTROESOPHAGEAL REFLUX DISEASE WITHOUT ESOPHAGITIS: ICD-10-CM

## 2025-02-16 DIAGNOSIS — J44.9 CHRONIC OBSTRUCTIVE PULMONARY DISEASE, UNSPECIFIED COPD TYPE (MULTI): Primary | ICD-10-CM

## 2025-02-16 DIAGNOSIS — F32.A DEPRESSION, UNSPECIFIED DEPRESSION TYPE: ICD-10-CM

## 2025-02-16 DIAGNOSIS — Z91.81 AT RISK FOR FALLS: ICD-10-CM

## 2025-02-16 DIAGNOSIS — R53.1 WEAKNESS: ICD-10-CM

## 2025-02-16 PROCEDURE — 99309 SBSQ NF CARE MODERATE MDM 30: CPT | Performed by: INTERNAL MEDICINE

## 2025-02-16 NOTE — LETTER
Patient: Lucas Valderrama  : 1950    Encounter Date: 2025    PLACE OF SERVICE:  [facility].    This is a subsequent visit.    Subjective  Patient ID: Lucas Valderrama is a 74 y.o. male who presents for Follow-up.    Mr. Lucas Valderrama is a 74-year-old male with history of COPD and heart failure.  He is unable to care for himself and requires supportive care.    Review of Systems   Constitutional:  Negative for chills and fever.   Cardiovascular:  Negative for chest pain.   All other systems reviewed and are negative.    Objective  /80   Pulse 82   Temp 36.8 °C (98.2 °F)   Resp 18     Physical Exam  Vitals reviewed.   Constitutional:       Comments: This is a well-developed, well-nourished male, lying in bed, appearing weak.   HENT:      Right Ear: Tympanic membrane, ear canal and external ear normal.      Left Ear: Tympanic membrane, ear canal and external ear normal.   Eyes:      General: No scleral icterus.     Pupils: Pupils are equal, round, and reactive to light.   Neck:      Vascular: No carotid bruit.   Cardiovascular:      Heart sounds: Normal heart sounds, S1 normal and S2 normal. No murmur heard.     No friction rub.   Pulmonary:      Effort: Pulmonary effort is normal.      Breath sounds: Decreased breath sounds (throughout) present.   Abdominal:      Palpations: There is no hepatomegaly, splenomegaly or mass.   Musculoskeletal:         General: No swelling or deformity. Normal range of motion.      Cervical back: Neck supple.      Right lower leg: Edema present.      Left lower leg: Edema present.   Lymphadenopathy:      Cervical: No cervical adenopathy.      Upper Body:      Right upper body: No axillary adenopathy.      Left upper body: No axillary adenopathy.      Lower Body: No right inguinal adenopathy. No left inguinal adenopathy.   Neurological:      Mental Status: He is oriented to person, place, and time. He is lethargic.      Cranial Nerves: Cranial nerves 2-12 are  intact. No cranial nerve deficit.      Sensory: No sensory deficit.      Motor: Motor function is intact. No weakness.      Gait: Gait is intact.      Deep Tendon Reflexes: Reflexes normal.   Psychiatric:         Mood and Affect: Mood normal. Mood is not anxious or depressed. Affect is not angry.         Behavior: Behavior is not agitated.         Thought Content: Thought content normal.         Judgment: Judgment normal.     LAB WORK: Laboratory studies reviewed.    Assessment/Plan  Problem List Items Addressed This Visit             ICD-10-CM       Advance Directives and General Issues    At risk for falls Z91.81       Cardiac and Vasculature    Atrial fibrillation (Multi) I48.91    Benign hypertension I10    Congestive heart failure I50.9       Gastrointestinal and Abdominal    Gastroesophageal reflux disease without esophagitis K21.9       Mental Health    Depression F32.A    Anxiety F41.9     Other Visit Diagnoses         Codes    Chronic obstructive pulmonary disease, unspecified COPD type (Multi)    -  Primary J44.9    Type 2 diabetes mellitus without complication, with long-term current use of insulin (Multi)     E11.9, Z79.4    Cognitive decline     R41.89    Weakness     R53.1        1. COPD, on bronchodilator therapy.  2. Congestive heart failure, on diuretic.  3. Diabetes, on insulin.  4. Atrial fibrillation, rate controlled.  5. Depression, on medication.  6. Anxiety, on medication.  7. Cognitive decline, supportive care.  8. GERD, on PPI.  9. Hypertension, med controlled.  10. Weakness, on PT/OT.  11. Fall risk, on fall precautions.    Scribe Attestation  By signing my name below, ILauren Scribe attest that this documentation has been prepared under the direction and in the presence of Santiago Hernandez MD.     All medical record entries made by the scribe were personally dictated by me I have reviewed the chart and agree the record accurately reflects my personal performance of his history  physical examination and management      Electronically Signed By: Santiago Hernandez MD   2/23/25  1:42 AM

## 2025-02-18 VITALS
TEMPERATURE: 98.2 F | HEART RATE: 82 BPM | DIASTOLIC BLOOD PRESSURE: 80 MMHG | RESPIRATION RATE: 18 BRPM | SYSTOLIC BLOOD PRESSURE: 130 MMHG

## 2025-02-18 ASSESSMENT — ENCOUNTER SYMPTOMS
CHILLS: 0
FEVER: 0

## 2025-02-18 NOTE — PROGRESS NOTES
PLACE OF SERVICE:  [facility].    This is a subsequent visit.    Subjective   Patient ID: Lucas Valderrama is a 74 y.o. male who presents for Follow-up.    Mr. Lucas Valderrama is a 74-year-old male with history of COPD and heart failure.  He is unable to care for himself and requires supportive care.    Review of Systems   Constitutional:  Negative for chills and fever.   Cardiovascular:  Negative for chest pain.   All other systems reviewed and are negative.    Objective   /80   Pulse 82   Temp 36.8 °C (98.2 °F)   Resp 18     Physical Exam  Vitals reviewed.   Constitutional:       Comments: This is a well-developed, well-nourished male, lying in bed, appearing weak.   HENT:      Right Ear: Tympanic membrane, ear canal and external ear normal.      Left Ear: Tympanic membrane, ear canal and external ear normal.   Eyes:      General: No scleral icterus.     Pupils: Pupils are equal, round, and reactive to light.   Neck:      Vascular: No carotid bruit.   Cardiovascular:      Heart sounds: Normal heart sounds, S1 normal and S2 normal. No murmur heard.     No friction rub.   Pulmonary:      Effort: Pulmonary effort is normal.      Breath sounds: Decreased breath sounds (throughout) present.   Abdominal:      Palpations: There is no hepatomegaly, splenomegaly or mass.   Musculoskeletal:         General: No swelling or deformity. Normal range of motion.      Cervical back: Neck supple.      Right lower leg: Edema present.      Left lower leg: Edema present.   Lymphadenopathy:      Cervical: No cervical adenopathy.      Upper Body:      Right upper body: No axillary adenopathy.      Left upper body: No axillary adenopathy.      Lower Body: No right inguinal adenopathy. No left inguinal adenopathy.   Neurological:      Mental Status: He is oriented to person, place, and time. He is lethargic.      Cranial Nerves: Cranial nerves 2-12 are intact. No cranial nerve deficit.      Sensory: No sensory deficit.       Motor: Motor function is intact. No weakness.      Gait: Gait is intact.      Deep Tendon Reflexes: Reflexes normal.   Psychiatric:         Mood and Affect: Mood normal. Mood is not anxious or depressed. Affect is not angry.         Behavior: Behavior is not agitated.         Thought Content: Thought content normal.         Judgment: Judgment normal.     LAB WORK: Laboratory studies reviewed.    Assessment/Plan   Problem List Items Addressed This Visit             ICD-10-CM       Advance Directives and General Issues    At risk for falls Z91.81       Cardiac and Vasculature    Atrial fibrillation (Multi) I48.91    Benign hypertension I10    Congestive heart failure I50.9       Gastrointestinal and Abdominal    Gastroesophageal reflux disease without esophagitis K21.9       Mental Health    Depression F32.A    Anxiety F41.9     Other Visit Diagnoses         Codes    Chronic obstructive pulmonary disease, unspecified COPD type (Multi)    -  Primary J44.9    Type 2 diabetes mellitus without complication, with long-term current use of insulin (Multi)     E11.9, Z79.4    Cognitive decline     R41.89    Weakness     R53.1        1. COPD, on bronchodilator therapy.  2. Congestive heart failure, on diuretic.  3. Diabetes, on insulin.  4. Atrial fibrillation, rate controlled.  5. Depression, on medication.  6. Anxiety, on medication.  7. Cognitive decline, supportive care.  8. GERD, on PPI.  9. Hypertension, med controlled.  10. Weakness, on PT/OT.  11. Fall risk, on fall precautions.    Scribe Attestation  By signing my name below, ILauren Scribe attest that this documentation has been prepared under the direction and in the presence of Santiago Hernandez MD.     All medical record entries made by the scribe were personally dictated by me I have reviewed the chart and agree the record accurately reflects my personal performance of his history physical examination and management

## 2025-02-22 ENCOUNTER — NURSING HOME VISIT (OUTPATIENT)
Dept: POST ACUTE CARE | Facility: EXTERNAL LOCATION | Age: 75
End: 2025-02-22
Payer: MEDICARE

## 2025-02-22 DIAGNOSIS — Z91.81 AT RISK FOR FALLS: ICD-10-CM

## 2025-02-22 DIAGNOSIS — R53.1 WEAKNESS: ICD-10-CM

## 2025-02-22 DIAGNOSIS — Z79.4 TYPE 2 DIABETES MELLITUS WITHOUT COMPLICATION, WITH LONG-TERM CURRENT USE OF INSULIN (MULTI): ICD-10-CM

## 2025-02-22 DIAGNOSIS — I50.9 CONGESTIVE HEART FAILURE, UNSPECIFIED HF CHRONICITY, UNSPECIFIED HEART FAILURE TYPE: Primary | ICD-10-CM

## 2025-02-22 DIAGNOSIS — J44.9 CHRONIC OBSTRUCTIVE PULMONARY DISEASE, UNSPECIFIED COPD TYPE (MULTI): ICD-10-CM

## 2025-02-22 DIAGNOSIS — K21.9 GASTROESOPHAGEAL REFLUX DISEASE WITHOUT ESOPHAGITIS: ICD-10-CM

## 2025-02-22 DIAGNOSIS — E11.9 TYPE 2 DIABETES MELLITUS WITHOUT COMPLICATION, WITH LONG-TERM CURRENT USE OF INSULIN (MULTI): ICD-10-CM

## 2025-02-22 DIAGNOSIS — R41.89 COGNITIVE DECLINE: ICD-10-CM

## 2025-02-22 DIAGNOSIS — F41.9 ANXIETY: ICD-10-CM

## 2025-02-22 DIAGNOSIS — I10 BENIGN HYPERTENSION: ICD-10-CM

## 2025-02-22 DIAGNOSIS — F32.A DEPRESSION, UNSPECIFIED DEPRESSION TYPE: ICD-10-CM

## 2025-02-22 DIAGNOSIS — I48.91 ATRIAL FIBRILLATION, UNSPECIFIED TYPE (MULTI): ICD-10-CM

## 2025-02-22 PROCEDURE — 99309 SBSQ NF CARE MODERATE MDM 30: CPT | Performed by: INTERNAL MEDICINE

## 2025-02-22 NOTE — LETTER
Patient: Lucas Valderrama  : 1950    Encounter Date: 2025    PLACE OF SERVICE:  [facility].    This is a subsequent visit.    Subjective  Patient ID: Lucas Valderrama is a 74 y.o. male who presents for Follow-up.    Mr. Lucas Valderrama is a 74-year-old male with history of diabetes.  He suffers from heart failure and COPD.  He is unable to care for himself and requires supportive care.    Review of Systems   Constitutional:  Negative for chills and fever.   Cardiovascular:  Negative for chest pain.   All other systems reviewed and are negative.    Objective  /76   Pulse 82   Temp 36.6 °C (97.9 °F)   Resp 18     Physical Exam  Vitals reviewed.   Constitutional:       General: He is not in acute distress.     Comments: This is a well-developed, well-nourished male, sitting in a chair.   HENT:      Right Ear: Tympanic membrane, ear canal and external ear normal.      Left Ear: Tympanic membrane, ear canal and external ear normal.   Eyes:      General: No scleral icterus.     Pupils: Pupils are equal, round, and reactive to light.   Neck:      Vascular: No carotid bruit.   Cardiovascular:      Heart sounds: Normal heart sounds, S1 normal and S2 normal. No murmur heard.     No friction rub.   Pulmonary:      Effort: Pulmonary effort is normal.      Breath sounds: Decreased breath sounds (throughout) present.   Abdominal:      Palpations: There is no hepatomegaly, splenomegaly or mass.   Musculoskeletal:         General: No swelling or deformity. Normal range of motion.      Cervical back: Neck supple.      Right lower leg: Edema present.      Left lower leg: Edema present.   Lymphadenopathy:      Cervical: No cervical adenopathy.      Upper Body:      Right upper body: No axillary adenopathy.      Left upper body: No axillary adenopathy.      Lower Body: No right inguinal adenopathy. No left inguinal adenopathy.   Neurological:      Mental Status: He is oriented to person, place, and time.       Cranial Nerves: Cranial nerves 2-12 are intact. No cranial nerve deficit.      Sensory: No sensory deficit.      Motor: Motor function is intact. No weakness.      Gait: Gait is intact.      Deep Tendon Reflexes: Reflexes normal.   Psychiatric:         Mood and Affect: Mood normal. Mood is not anxious or depressed. Affect is not angry.         Behavior: Behavior is not agitated.         Thought Content: Thought content normal.         Judgment: Judgment normal.     LAB WORK: Laboratory studies reviewed.    Assessment/Plan  Problem List Items Addressed This Visit             ICD-10-CM       Advance Directives and General Issues    At risk for falls Z91.81       Cardiac and Vasculature    Atrial fibrillation (Multi) I48.91    Benign hypertension I10    Congestive heart failure - Primary I50.9       Gastrointestinal and Abdominal    Gastroesophageal reflux disease without esophagitis K21.9       Mental Health    Depression F32.A    Anxiety F41.9     Other Visit Diagnoses         Codes    Chronic obstructive pulmonary disease, unspecified COPD type (Multi)     J44.9    Cognitive decline     R41.89    Type 2 diabetes mellitus without complication, with long-term current use of insulin (Multi)     E11.9, Z79.4    Weakness     R53.1        1. Congestive heart failure, on diuretic.  2. COPD, on bronchodilator therapy.  3. Cognitive decline, supportive care.  4. Diabetes, on insulin.  5. Depression, on medication.  6. Atrial fibrillation, rate controlled.  7. GERD, on PPI.  8. Hypertension, med controlled.  9. Anxiety, on medication.  10. Weakness, on PT/OT.  11. Fall risk, on fall precautions.    Scribe Attestation  By signing my name below, I, Keshav Simmons attest that this documentation has been prepared under the direction and in the presence of Santiago Hernandez MD.     All medical record entries made by the scribe were personally dictated by me I have reviewed the chart and agree the record accurately reflects my  personal performance of his history physical examination and management      Electronically Signed By: Santiago Hernandez MD   2/25/25 11:34 PM

## 2025-02-24 VITALS
RESPIRATION RATE: 18 BRPM | SYSTOLIC BLOOD PRESSURE: 126 MMHG | TEMPERATURE: 97.9 F | DIASTOLIC BLOOD PRESSURE: 76 MMHG | HEART RATE: 82 BPM

## 2025-02-24 ASSESSMENT — ENCOUNTER SYMPTOMS
FEVER: 0
CHILLS: 0

## 2025-02-24 NOTE — PROGRESS NOTES
PLACE OF SERVICE:  [facility].    This is a subsequent visit.    Subjective   Patient ID: Lucas Valderrama is a 74 y.o. male who presents for Follow-up.    Mr. Lucas Valderrama is a 74-year-old male with history of diabetes.  He suffers from heart failure and COPD.  He is unable to care for himself and requires supportive care.    Review of Systems   Constitutional:  Negative for chills and fever.   Cardiovascular:  Negative for chest pain.   All other systems reviewed and are negative.    Objective   /76   Pulse 82   Temp 36.6 °C (97.9 °F)   Resp 18     Physical Exam  Vitals reviewed.   Constitutional:       General: He is not in acute distress.     Comments: This is a well-developed, well-nourished male, sitting in a chair.   HENT:      Right Ear: Tympanic membrane, ear canal and external ear normal.      Left Ear: Tympanic membrane, ear canal and external ear normal.   Eyes:      General: No scleral icterus.     Pupils: Pupils are equal, round, and reactive to light.   Neck:      Vascular: No carotid bruit.   Cardiovascular:      Heart sounds: Normal heart sounds, S1 normal and S2 normal. No murmur heard.     No friction rub.   Pulmonary:      Effort: Pulmonary effort is normal.      Breath sounds: Decreased breath sounds (throughout) present.   Abdominal:      Palpations: There is no hepatomegaly, splenomegaly or mass.   Musculoskeletal:         General: No swelling or deformity. Normal range of motion.      Cervical back: Neck supple.      Right lower leg: Edema present.      Left lower leg: Edema present.   Lymphadenopathy:      Cervical: No cervical adenopathy.      Upper Body:      Right upper body: No axillary adenopathy.      Left upper body: No axillary adenopathy.      Lower Body: No right inguinal adenopathy. No left inguinal adenopathy.   Neurological:      Mental Status: He is oriented to person, place, and time.      Cranial Nerves: Cranial nerves 2-12 are intact. No cranial nerve  deficit.      Sensory: No sensory deficit.      Motor: Motor function is intact. No weakness.      Gait: Gait is intact.      Deep Tendon Reflexes: Reflexes normal.   Psychiatric:         Mood and Affect: Mood normal. Mood is not anxious or depressed. Affect is not angry.         Behavior: Behavior is not agitated.         Thought Content: Thought content normal.         Judgment: Judgment normal.     LAB WORK: Laboratory studies reviewed.    Assessment/Plan   Problem List Items Addressed This Visit             ICD-10-CM       Advance Directives and General Issues    At risk for falls Z91.81       Cardiac and Vasculature    Atrial fibrillation (Multi) I48.91    Benign hypertension I10    Congestive heart failure - Primary I50.9       Gastrointestinal and Abdominal    Gastroesophageal reflux disease without esophagitis K21.9       Mental Health    Depression F32.A    Anxiety F41.9     Other Visit Diagnoses         Codes    Chronic obstructive pulmonary disease, unspecified COPD type (Multi)     J44.9    Cognitive decline     R41.89    Type 2 diabetes mellitus without complication, with long-term current use of insulin (Multi)     E11.9, Z79.4    Weakness     R53.1        1. Congestive heart failure, on diuretic.  2. COPD, on bronchodilator therapy.  3. Cognitive decline, supportive care.  4. Diabetes, on insulin.  5. Depression, on medication.  6. Atrial fibrillation, rate controlled.  7. GERD, on PPI.  8. Hypertension, med controlled.  9. Anxiety, on medication.  10. Weakness, on PT/OT.  11. Fall risk, on fall precautions.    Scribe Attestation  By signing my name below, ILauren Scribe attest that this documentation has been prepared under the direction and in the presence of Santiago Hernandez MD.     All medical record entries made by the scribe were personally dictated by me I have reviewed the chart and agree the record accurately reflects my personal performance of his history physical examination and  management

## 2025-03-01 ENCOUNTER — NURSING HOME VISIT (OUTPATIENT)
Dept: POST ACUTE CARE | Facility: EXTERNAL LOCATION | Age: 75
End: 2025-03-01
Payer: MEDICARE

## 2025-03-01 DIAGNOSIS — Z91.81 AT RISK FOR FALLS: ICD-10-CM

## 2025-03-01 DIAGNOSIS — I10 BENIGN HYPERTENSION: ICD-10-CM

## 2025-03-01 DIAGNOSIS — R53.1 WEAKNESS: ICD-10-CM

## 2025-03-01 DIAGNOSIS — K21.9 GASTROESOPHAGEAL REFLUX DISEASE WITHOUT ESOPHAGITIS: ICD-10-CM

## 2025-03-01 DIAGNOSIS — F32.A DEPRESSION, UNSPECIFIED DEPRESSION TYPE: ICD-10-CM

## 2025-03-01 DIAGNOSIS — Z79.4 TYPE 2 DIABETES MELLITUS WITHOUT COMPLICATION, WITH LONG-TERM CURRENT USE OF INSULIN (MULTI): ICD-10-CM

## 2025-03-01 DIAGNOSIS — E11.9 TYPE 2 DIABETES MELLITUS WITHOUT COMPLICATION, WITH LONG-TERM CURRENT USE OF INSULIN (MULTI): ICD-10-CM

## 2025-03-01 DIAGNOSIS — I48.91 ATRIAL FIBRILLATION, UNSPECIFIED TYPE (MULTI): ICD-10-CM

## 2025-03-01 DIAGNOSIS — R41.89 COGNITIVE DECLINE: ICD-10-CM

## 2025-03-01 DIAGNOSIS — I50.9 CONGESTIVE HEART FAILURE, UNSPECIFIED HF CHRONICITY, UNSPECIFIED HEART FAILURE TYPE: ICD-10-CM

## 2025-03-01 DIAGNOSIS — J44.9 CHRONIC OBSTRUCTIVE PULMONARY DISEASE, UNSPECIFIED COPD TYPE (MULTI): Primary | ICD-10-CM

## 2025-03-01 DIAGNOSIS — F41.9 ANXIETY: ICD-10-CM

## 2025-03-01 PROCEDURE — 99309 SBSQ NF CARE MODERATE MDM 30: CPT | Performed by: INTERNAL MEDICINE

## 2025-03-01 NOTE — LETTER
Patient: Lucas Valderrama  : 1950    Encounter Date: 2025    PLACE OF SERVICE:  [facility]    This is a subsequent visit.    Subjective  Patient ID: Lucas Valderrama is a 74 y.o. male who presents for Follow-up.    Mr. Lucas Valderrama is a 74-year-old male with history of diabetes.  He suffers from COPD and CHF.  He is unable to care for himself and requires supportive care.    Review of Systems   Constitutional:  Negative for chills and fever.   Cardiovascular:  Negative for chest pain.   All other systems reviewed and are negative.    Objective  /76   Pulse 78   Temp 36.7 °C (98 °F)   Resp 18     Physical Exam  Vitals reviewed.   Constitutional:       Comments: This is a well-developed, well-nourished male, lying in bed, appearing weak    HENT:      Right Ear: Tympanic membrane, ear canal and external ear normal.      Left Ear: Tympanic membrane, ear canal and external ear normal.   Eyes:      General: No scleral icterus.     Pupils: Pupils are equal, round, and reactive to light.   Neck:      Vascular: No carotid bruit.   Cardiovascular:      Heart sounds: Normal heart sounds, S1 normal and S2 normal. No murmur heard.     No friction rub.   Pulmonary:      Breath sounds: Decreased breath sounds (throughout) present.   Abdominal:      Palpations: There is no hepatomegaly, splenomegaly or mass.   Musculoskeletal:         General: No swelling or deformity. Normal range of motion.      Cervical back: Neck supple.      Right lower leg: Edema (trace) present.      Left lower leg: Edema (trace) present.   Lymphadenopathy:      Cervical: No cervical adenopathy.      Upper Body:      Right upper body: No axillary adenopathy.      Left upper body: No axillary adenopathy.      Lower Body: No right inguinal adenopathy. No left inguinal adenopathy.   Neurological:      Mental Status: He is oriented to person, place, and time. He is lethargic.      Cranial Nerves: Cranial nerves 2-12 are intact. No  cranial nerve deficit.      Sensory: No sensory deficit.      Motor: Motor function is intact. No weakness.      Gait: Gait is intact.      Deep Tendon Reflexes: Reflexes normal.   Psychiatric:         Mood and Affect: Mood normal. Mood is not anxious or depressed. Affect is not angry.         Behavior: Behavior is not agitated.         Thought Content: Thought content normal.         Judgment: Judgment normal.     LAB WORK: Laboratory studies reviewed.    Assessment/Plan  Problem List Items Addressed This Visit             ICD-10-CM    Depression F32.A    Atrial fibrillation (Multi) I48.91    Benign hypertension I10    Gastroesophageal reflux disease without esophagitis K21.9    Congestive heart failure I50.9    At risk for falls Z91.81    Anxiety F41.9     Other Visit Diagnoses         Codes    Chronic obstructive pulmonary disease, unspecified COPD type (Multi)    -  Primary J44.9    Type 2 diabetes mellitus without complication, with long-term current use of insulin (Multi)     E11.9, Z79.4    Cognitive decline     R41.89    Weakness     R53.1        1. COPD, on bronchodilator therapy.  2. Congestive heart failure, on diuretic.  3. Diabetes, on insulin.  4. Atrial fibrillation, rate controlled.  5. Hypertension, med controlled.  6. Cognitive decline, supportive care.  7. Depression, on medication.  8. Anxiety, on medication.  9. GERD, on PPI.  10. Weakness, on PT/OT.  11. Fall risk, on fall precautions.    Scribe Attestation  By signing my name below, IMarion Scribe attest that this documentation has been prepared under the direction and in the presence of Santiago Hernandez MD.     All medical record entries made by the scribe were personally dictated by me I have reviewed the chart and agree the record accurately reflects my personal performance of his history physical examination and management      Electronically Signed By: Santiago Hernandez MD   3/3/25 11:32 PM

## 2025-03-03 VITALS
RESPIRATION RATE: 18 BRPM | HEART RATE: 78 BPM | SYSTOLIC BLOOD PRESSURE: 126 MMHG | DIASTOLIC BLOOD PRESSURE: 76 MMHG | TEMPERATURE: 98 F

## 2025-03-03 ASSESSMENT — ENCOUNTER SYMPTOMS
FEVER: 0
CHILLS: 0

## 2025-03-03 NOTE — PROGRESS NOTES
PLACE OF SERVICE:  [facility]    This is a subsequent visit.    Subjective   Patient ID: Lucas Valderrama is a 74 y.o. male who presents for Follow-up.    Mr. Lucas Valderrama is a 74-year-old male with history of diabetes.  He suffers from COPD and CHF.  He is unable to care for himself and requires supportive care.    Review of Systems   Constitutional:  Negative for chills and fever.   Cardiovascular:  Negative for chest pain.   All other systems reviewed and are negative.    Objective   /76   Pulse 78   Temp 36.7 °C (98 °F)   Resp 18     Physical Exam  Vitals reviewed.   Constitutional:       Comments: This is a well-developed, well-nourished male, lying in bed, appearing weak    HENT:      Right Ear: Tympanic membrane, ear canal and external ear normal.      Left Ear: Tympanic membrane, ear canal and external ear normal.   Eyes:      General: No scleral icterus.     Pupils: Pupils are equal, round, and reactive to light.   Neck:      Vascular: No carotid bruit.   Cardiovascular:      Heart sounds: Normal heart sounds, S1 normal and S2 normal. No murmur heard.     No friction rub.   Pulmonary:      Breath sounds: Decreased breath sounds (throughout) present.   Abdominal:      Palpations: There is no hepatomegaly, splenomegaly or mass.   Musculoskeletal:         General: No swelling or deformity. Normal range of motion.      Cervical back: Neck supple.      Right lower leg: Edema (trace) present.      Left lower leg: Edema (trace) present.   Lymphadenopathy:      Cervical: No cervical adenopathy.      Upper Body:      Right upper body: No axillary adenopathy.      Left upper body: No axillary adenopathy.      Lower Body: No right inguinal adenopathy. No left inguinal adenopathy.   Neurological:      Mental Status: He is oriented to person, place, and time. He is lethargic.      Cranial Nerves: Cranial nerves 2-12 are intact. No cranial nerve deficit.      Sensory: No sensory deficit.      Motor:  Motor function is intact. No weakness.      Gait: Gait is intact.      Deep Tendon Reflexes: Reflexes normal.   Psychiatric:         Mood and Affect: Mood normal. Mood is not anxious or depressed. Affect is not angry.         Behavior: Behavior is not agitated.         Thought Content: Thought content normal.         Judgment: Judgment normal.     LAB WORK: Laboratory studies reviewed.    Assessment/Plan   Problem List Items Addressed This Visit             ICD-10-CM    Depression F32.A    Atrial fibrillation (Multi) I48.91    Benign hypertension I10    Gastroesophageal reflux disease without esophagitis K21.9    Congestive heart failure I50.9    At risk for falls Z91.81    Anxiety F41.9     Other Visit Diagnoses         Codes    Chronic obstructive pulmonary disease, unspecified COPD type (Multi)    -  Primary J44.9    Type 2 diabetes mellitus without complication, with long-term current use of insulin (Multi)     E11.9, Z79.4    Cognitive decline     R41.89    Weakness     R53.1        1. COPD, on bronchodilator therapy.  2. Congestive heart failure, on diuretic.  3. Diabetes, on insulin.  4. Atrial fibrillation, rate controlled.  5. Hypertension, med controlled.  6. Cognitive decline, supportive care.  7. Depression, on medication.  8. Anxiety, on medication.  9. GERD, on PPI.  10. Weakness, on PT/OT.  11. Fall risk, on fall precautions.    Scribe Attestation  By signing my name below, IMarion Scribe attest that this documentation has been prepared under the direction and in the presence of Santiago Hernandez MD.     All medical record entries made by the scribe were personally dictated by me I have reviewed the chart and agree the record accurately reflects my personal performance of his history physical examination and management

## 2025-03-08 ENCOUNTER — NURSING HOME VISIT (OUTPATIENT)
Dept: POST ACUTE CARE | Facility: EXTERNAL LOCATION | Age: 75
End: 2025-03-08
Payer: MEDICARE

## 2025-03-08 DIAGNOSIS — E11.9 TYPE 2 DIABETES MELLITUS WITHOUT COMPLICATION, WITH LONG-TERM CURRENT USE OF INSULIN (MULTI): ICD-10-CM

## 2025-03-08 DIAGNOSIS — R41.89 COGNITIVE DECLINE: ICD-10-CM

## 2025-03-08 DIAGNOSIS — I10 BENIGN HYPERTENSION: ICD-10-CM

## 2025-03-08 DIAGNOSIS — J44.9 CHRONIC OBSTRUCTIVE PULMONARY DISEASE, UNSPECIFIED COPD TYPE (MULTI): Primary | ICD-10-CM

## 2025-03-08 DIAGNOSIS — I48.91 ATRIAL FIBRILLATION, UNSPECIFIED TYPE (MULTI): ICD-10-CM

## 2025-03-08 DIAGNOSIS — K21.9 GASTROESOPHAGEAL REFLUX DISEASE WITHOUT ESOPHAGITIS: ICD-10-CM

## 2025-03-08 DIAGNOSIS — I50.9 CONGESTIVE HEART FAILURE, UNSPECIFIED HF CHRONICITY, UNSPECIFIED HEART FAILURE TYPE: ICD-10-CM

## 2025-03-08 DIAGNOSIS — F41.9 ANXIETY: ICD-10-CM

## 2025-03-08 DIAGNOSIS — F32.A DEPRESSION, UNSPECIFIED DEPRESSION TYPE: ICD-10-CM

## 2025-03-08 DIAGNOSIS — Z79.4 TYPE 2 DIABETES MELLITUS WITHOUT COMPLICATION, WITH LONG-TERM CURRENT USE OF INSULIN (MULTI): ICD-10-CM

## 2025-03-08 DIAGNOSIS — Z91.81 AT RISK FOR FALLS: ICD-10-CM

## 2025-03-08 PROCEDURE — 99309 SBSQ NF CARE MODERATE MDM 30: CPT | Performed by: INTERNAL MEDICINE

## 2025-03-08 NOTE — LETTER
Patient: Lucas Valderrama  : 1950    Encounter Date: 2025    PLACE OF SERVICE:  [facility]    This is a subsequent visit.    Subjective  Patient ID: Lucas Valderrama is a 74 y.o. male who presents for Follow-up.    Mr. Lucas Valderrama is a 74-year-old male with history of COPD and CHF.  He suffers from cognitive decline and is unable to care for himself.  He requires supportive care.    Review of Systems   Constitutional:  Negative for chills and fever.   Cardiovascular:  Negative for chest pain.   All other systems reviewed and are negative.    Objective  /80   Pulse 80   Temp 36.6 °C (97.9 °F)   Resp 18     Physical Exam  Vitals reviewed.   Constitutional:       Comments: This is a well-developed and well-nourished male, lying in bed, appearing weak.   HENT:      Right Ear: Tympanic membrane, ear canal and external ear normal.      Left Ear: Tympanic membrane, ear canal and external ear normal.   Eyes:      General: No scleral icterus.     Pupils: Pupils are equal, round, and reactive to light.   Neck:      Vascular: No carotid bruit.   Cardiovascular:      Heart sounds: Normal heart sounds, S1 normal and S2 normal. No murmur heard.     No friction rub.   Pulmonary:      Breath sounds: Decreased breath sounds (throughout) present.   Abdominal:      Palpations: There is no hepatomegaly, splenomegaly or mass.   Musculoskeletal:         General: No swelling or deformity. Normal range of motion.      Cervical back: Neck supple.      Right lower leg: Edema present.      Left lower leg: Edema present.   Lymphadenopathy:      Cervical: No cervical adenopathy.      Upper Body:      Right upper body: No axillary adenopathy.      Left upper body: No axillary adenopathy.      Lower Body: No right inguinal adenopathy. No left inguinal adenopathy.   Neurological:      Mental Status: He is oriented to person, place, and time. He is lethargic.      Cranial Nerves: Cranial nerves 2-12 are intact. No  cranial nerve deficit.      Sensory: No sensory deficit.      Motor: Motor function is intact. No weakness.      Gait: Gait is intact.      Deep Tendon Reflexes: Reflexes normal.   Psychiatric:         Mood and Affect: Mood normal. Mood is not anxious or depressed. Affect is not angry.         Behavior: Behavior is not agitated.         Thought Content: Thought content normal.         Judgment: Judgment normal.     LAB WORK: Laboratory studies were reviewed.    Assessment/Plan  Problem List Items Addressed This Visit             ICD-10-CM    Depression F32.A    Atrial fibrillation (Multi) I48.91    Benign hypertension I10    Gastroesophageal reflux disease without esophagitis K21.9    Congestive heart failure I50.9    At risk for falls Z91.81    Anxiety F41.9     Other Visit Diagnoses         Codes    Chronic obstructive pulmonary disease, unspecified COPD type (Multi)    -  Primary J44.9    Type 2 diabetes mellitus without complication, with long-term current use of insulin (Multi)     E11.9, Z79.4    Cognitive decline     R41.89        1. COPD, on bronchodilator therapy.  2. Heart failure, on diuretic.  3. Diabetes, on insulin.  4. Hypertension, medically controlled.  5. Atrial fibrillation, rate controlled.  6. GERD, on PPI.  7. Depression, on medication.  8. Anxiety, on medication.  9. Cognitive decline, on supportive care.  10. Fall risk, on fall precautions.    Scribe Attestation  By signing my name below, IMarion Scribe attest that this documentation has been prepared under the direction and in the presence of Santiago Hernandez MD.     All medical record entries made by the scribe were personally dictated by me I have reviewed the chart and agree the record accurately reflects my personal performance of his history physical examination and management      Electronically Signed By: Santiago Hernandez MD   3/12/25  1:12 AM

## 2025-03-10 VITALS
DIASTOLIC BLOOD PRESSURE: 80 MMHG | SYSTOLIC BLOOD PRESSURE: 126 MMHG | TEMPERATURE: 97.9 F | RESPIRATION RATE: 18 BRPM | HEART RATE: 80 BPM

## 2025-03-10 ASSESSMENT — ENCOUNTER SYMPTOMS
CHILLS: 0
FEVER: 0

## 2025-03-10 NOTE — PROGRESS NOTES
PLACE OF SERVICE:  [facility]    This is a subsequent visit.    Subjective   Patient ID: Lucas Valderrama is a 74 y.o. male who presents for Follow-up.    Mr. Lucas Valderrama is a 74-year-old male with history of COPD and CHF.  He suffers from cognitive decline and is unable to care for himself.  He requires supportive care.    Review of Systems   Constitutional:  Negative for chills and fever.   Cardiovascular:  Negative for chest pain.   All other systems reviewed and are negative.    Objective   /80   Pulse 80   Temp 36.6 °C (97.9 °F)   Resp 18     Physical Exam  Vitals reviewed.   Constitutional:       Comments: This is a well-developed and well-nourished male, lying in bed, appearing weak.   HENT:      Right Ear: Tympanic membrane, ear canal and external ear normal.      Left Ear: Tympanic membrane, ear canal and external ear normal.   Eyes:      General: No scleral icterus.     Pupils: Pupils are equal, round, and reactive to light.   Neck:      Vascular: No carotid bruit.   Cardiovascular:      Heart sounds: Normal heart sounds, S1 normal and S2 normal. No murmur heard.     No friction rub.   Pulmonary:      Breath sounds: Decreased breath sounds (throughout) present.   Abdominal:      Palpations: There is no hepatomegaly, splenomegaly or mass.   Musculoskeletal:         General: No swelling or deformity. Normal range of motion.      Cervical back: Neck supple.      Right lower leg: Edema present.      Left lower leg: Edema present.   Lymphadenopathy:      Cervical: No cervical adenopathy.      Upper Body:      Right upper body: No axillary adenopathy.      Left upper body: No axillary adenopathy.      Lower Body: No right inguinal adenopathy. No left inguinal adenopathy.   Neurological:      Mental Status: He is oriented to person, place, and time. He is lethargic.      Cranial Nerves: Cranial nerves 2-12 are intact. No cranial nerve deficit.      Sensory: No sensory deficit.      Motor:  Motor function is intact. No weakness.      Gait: Gait is intact.      Deep Tendon Reflexes: Reflexes normal.   Psychiatric:         Mood and Affect: Mood normal. Mood is not anxious or depressed. Affect is not angry.         Behavior: Behavior is not agitated.         Thought Content: Thought content normal.         Judgment: Judgment normal.     LAB WORK: Laboratory studies were reviewed.    Assessment/Plan   Problem List Items Addressed This Visit             ICD-10-CM    Depression F32.A    Atrial fibrillation (Multi) I48.91    Benign hypertension I10    Gastroesophageal reflux disease without esophagitis K21.9    Congestive heart failure I50.9    At risk for falls Z91.81    Anxiety F41.9     Other Visit Diagnoses         Codes    Chronic obstructive pulmonary disease, unspecified COPD type (Multi)    -  Primary J44.9    Type 2 diabetes mellitus without complication, with long-term current use of insulin (Multi)     E11.9, Z79.4    Cognitive decline     R41.89        1. COPD, on bronchodilator therapy.  2. Heart failure, on diuretic.  3. Diabetes, on insulin.  4. Hypertension, medically controlled.  5. Atrial fibrillation, rate controlled.  6. GERD, on PPI.  7. Depression, on medication.  8. Anxiety, on medication.  9. Cognitive decline, on supportive care.  10. Fall risk, on fall precautions.    Scribe Attestation  By signing my name below, I, Keshav Bonilla attest that this documentation has been prepared under the direction and in the presence of Santiago Hernandez MD.     All medical record entries made by the scribe were personally dictated by me I have reviewed the chart and agree the record accurately reflects my personal performance of his history physical examination and management

## 2025-03-15 ENCOUNTER — NURSING HOME VISIT (OUTPATIENT)
Dept: POST ACUTE CARE | Facility: EXTERNAL LOCATION | Age: 75
End: 2025-03-15
Payer: MEDICARE

## 2025-03-15 DIAGNOSIS — R41.89 COGNITIVE DECLINE: ICD-10-CM

## 2025-03-15 DIAGNOSIS — Z91.81 AT RISK FOR FALLS: ICD-10-CM

## 2025-03-15 DIAGNOSIS — E11.9 TYPE 2 DIABETES MELLITUS WITHOUT COMPLICATION, WITH LONG-TERM CURRENT USE OF INSULIN (MULTI): Primary | ICD-10-CM

## 2025-03-15 DIAGNOSIS — I48.91 ATRIAL FIBRILLATION, UNSPECIFIED TYPE (MULTI): ICD-10-CM

## 2025-03-15 DIAGNOSIS — I50.9 CONGESTIVE HEART FAILURE, UNSPECIFIED HF CHRONICITY, UNSPECIFIED HEART FAILURE TYPE: ICD-10-CM

## 2025-03-15 DIAGNOSIS — J44.9 CHRONIC OBSTRUCTIVE PULMONARY DISEASE, UNSPECIFIED COPD TYPE (MULTI): ICD-10-CM

## 2025-03-15 DIAGNOSIS — R53.1 WEAKNESS: ICD-10-CM

## 2025-03-15 DIAGNOSIS — F32.A DEPRESSION, UNSPECIFIED DEPRESSION TYPE: ICD-10-CM

## 2025-03-15 DIAGNOSIS — Z79.4 TYPE 2 DIABETES MELLITUS WITHOUT COMPLICATION, WITH LONG-TERM CURRENT USE OF INSULIN (MULTI): Primary | ICD-10-CM

## 2025-03-15 DIAGNOSIS — F41.9 ANXIETY: ICD-10-CM

## 2025-03-15 DIAGNOSIS — K21.9 GASTROESOPHAGEAL REFLUX DISEASE WITHOUT ESOPHAGITIS: ICD-10-CM

## 2025-03-15 DIAGNOSIS — I10 BENIGN HYPERTENSION: ICD-10-CM

## 2025-03-15 PROCEDURE — 99308 SBSQ NF CARE LOW MDM 20: CPT | Performed by: INTERNAL MEDICINE

## 2025-03-15 NOTE — LETTER
Patient: Lucas Valderrama  : 1950    Encounter Date: 03/15/2025    PLACE OF SERVICE:  [facility].    This is a subsequent visit.    Subjective  Patient ID: Lucas Valderrama is a 75 y.o. male who presents for Follow-up.    Mr. Lucas Valderrama is a 75-year-old diabetic male with history of heart failure and COPD.  He is unable to care for himself and requires supportive care.    Review of Systems   Constitutional:  Negative for chills and fever.   Cardiovascular:  Negative for chest pain.   All other systems reviewed and are negative.    Objective  /82   Pulse 80   Temp 36.7 °C (98.1 °F)   Resp 18     Physical Exam  Vitals reviewed.   Constitutional:       Comments: This is a well-developed, well-nourished male, lying in bed, appearing weak.   HENT:      Right Ear: Tympanic membrane, ear canal and external ear normal.      Left Ear: Tympanic membrane, ear canal and external ear normal.   Eyes:      General: No scleral icterus.     Pupils: Pupils are equal, round, and reactive to light.   Neck:      Vascular: No carotid bruit.   Cardiovascular:      Heart sounds: Normal heart sounds, S1 normal and S2 normal. No murmur heard.     No friction rub.   Pulmonary:      Effort: Pulmonary effort is normal.      Breath sounds: Decreased breath sounds (throughout) present.   Abdominal:      Palpations: There is no hepatomegaly, splenomegaly or mass.   Musculoskeletal:         General: No swelling or deformity. Normal range of motion.      Cervical back: Neck supple.      Right lower leg: Edema present.      Left lower leg: Edema present.   Lymphadenopathy:      Cervical: No cervical adenopathy.      Upper Body:      Right upper body: No axillary adenopathy.      Left upper body: No axillary adenopathy.      Lower Body: No right inguinal adenopathy. No left inguinal adenopathy.   Neurological:      Mental Status: He is oriented to person, place, and time. He is lethargic.      Cranial Nerves: Cranial nerves  2-12 are intact. No cranial nerve deficit.      Sensory: No sensory deficit.      Motor: Motor function is intact. No weakness.      Gait: Gait is intact.      Deep Tendon Reflexes: Reflexes normal.   Psychiatric:         Mood and Affect: Mood normal. Mood is not anxious or depressed. Affect is not angry.         Behavior: Behavior is not agitated.         Thought Content: Thought content normal.         Judgment: Judgment normal.     LAB WORK:  Laboratory studies were reviewed.    Assessment/Plan  Problem List Items Addressed This Visit             ICD-10-CM       Advance Directives and General Issues    At risk for falls Z91.81       Cardiac and Vasculature    Atrial fibrillation (Multi) I48.91    Benign hypertension I10    Congestive heart failure I50.9       Gastrointestinal and Abdominal    Gastroesophageal reflux disease without esophagitis K21.9       Mental Health    Depression F32.A    Anxiety F41.9     Other Visit Diagnoses         Codes    Type 2 diabetes mellitus without complication, with long-term current use of insulin (Multi)    -  Primary E11.9, Z79.4    Chronic obstructive pulmonary disease, unspecified COPD type (Multi)     J44.9    Cognitive decline     R41.89    Weakness     R53.1        1. Diabetes, on insulin.  2. Heart failure, on diuretic.  3. COPD, on bronchodilator therapy.  4. Atrial fibrillation, rate controlled.  5. Hypertension, medically controlled.  6. Depression, on medication.  7. Anxiety, on medication.  8. Cognitive decline, on supportive care.  9. GERD, on PPI.  10. Weakness, on occupational therapy.  11. Fall risk, on fall precautions.    Scribe Attestation  By signing my name below, ILauren Scribe attest that this documentation has been prepared under the direction and in the presence of Santiago Hernandez MD.     All medical record entries made by the scribe were personally dictated by me I have reviewed the chart and agree the record accurately reflects my personal  performance of his history physical examination and management      Electronically Signed By: Santiago Hernandez MD   3/19/25 12:36 AM

## 2025-03-17 VITALS
TEMPERATURE: 98.1 F | HEART RATE: 80 BPM | SYSTOLIC BLOOD PRESSURE: 126 MMHG | DIASTOLIC BLOOD PRESSURE: 82 MMHG | RESPIRATION RATE: 18 BRPM

## 2025-03-17 ASSESSMENT — ENCOUNTER SYMPTOMS
FEVER: 0
CHILLS: 0

## 2025-03-17 NOTE — PROGRESS NOTES
PLACE OF SERVICE:  [facility].    This is a subsequent visit.    Subjective   Patient ID: Lucas Valderrama is a 75 y.o. male who presents for Follow-up.    Mr. Lucas Valderrama is a 75-year-old diabetic male with history of heart failure and COPD.  He is unable to care for himself and requires supportive care.    Review of Systems   Constitutional:  Negative for chills and fever.   Cardiovascular:  Negative for chest pain.   All other systems reviewed and are negative.    Objective   /82   Pulse 80   Temp 36.7 °C (98.1 °F)   Resp 18     Physical Exam  Vitals reviewed.   Constitutional:       Comments: This is a well-developed, well-nourished male, lying in bed, appearing weak.   HENT:      Right Ear: Tympanic membrane, ear canal and external ear normal.      Left Ear: Tympanic membrane, ear canal and external ear normal.   Eyes:      General: No scleral icterus.     Pupils: Pupils are equal, round, and reactive to light.   Neck:      Vascular: No carotid bruit.   Cardiovascular:      Heart sounds: Normal heart sounds, S1 normal and S2 normal. No murmur heard.     No friction rub.   Pulmonary:      Effort: Pulmonary effort is normal.      Breath sounds: Decreased breath sounds (throughout) present.   Abdominal:      Palpations: There is no hepatomegaly, splenomegaly or mass.   Musculoskeletal:         General: No swelling or deformity. Normal range of motion.      Cervical back: Neck supple.      Right lower leg: Edema present.      Left lower leg: Edema present.   Lymphadenopathy:      Cervical: No cervical adenopathy.      Upper Body:      Right upper body: No axillary adenopathy.      Left upper body: No axillary adenopathy.      Lower Body: No right inguinal adenopathy. No left inguinal adenopathy.   Neurological:      Mental Status: He is oriented to person, place, and time. He is lethargic.      Cranial Nerves: Cranial nerves 2-12 are intact. No cranial nerve deficit.      Sensory: No sensory  deficit.      Motor: Motor function is intact. No weakness.      Gait: Gait is intact.      Deep Tendon Reflexes: Reflexes normal.   Psychiatric:         Mood and Affect: Mood normal. Mood is not anxious or depressed. Affect is not angry.         Behavior: Behavior is not agitated.         Thought Content: Thought content normal.         Judgment: Judgment normal.     LAB WORK:  Laboratory studies were reviewed.    Assessment/Plan   Problem List Items Addressed This Visit             ICD-10-CM       Advance Directives and General Issues    At risk for falls Z91.81       Cardiac and Vasculature    Atrial fibrillation (Multi) I48.91    Benign hypertension I10    Congestive heart failure I50.9       Gastrointestinal and Abdominal    Gastroesophageal reflux disease without esophagitis K21.9       Mental Health    Depression F32.A    Anxiety F41.9     Other Visit Diagnoses         Codes    Type 2 diabetes mellitus without complication, with long-term current use of insulin (Multi)    -  Primary E11.9, Z79.4    Chronic obstructive pulmonary disease, unspecified COPD type (Multi)     J44.9    Cognitive decline     R41.89    Weakness     R53.1        1. Diabetes, on insulin.  2. Heart failure, on diuretic.  3. COPD, on bronchodilator therapy.  4. Atrial fibrillation, rate controlled.  5. Hypertension, medically controlled.  6. Depression, on medication.  7. Anxiety, on medication.  8. Cognitive decline, on supportive care.  9. GERD, on PPI.  10. Weakness, on occupational therapy.  11. Fall risk, on fall precautions.    Scribe Attestation  By signing my name below, I, Keshav Simmons attest that this documentation has been prepared under the direction and in the presence of Santiago Hernandez MD.     All medical record entries made by the scribe were personally dictated by me I have reviewed the chart and agree the record accurately reflects my personal performance of his history physical examination and management

## 2025-03-23 ENCOUNTER — NURSING HOME VISIT (OUTPATIENT)
Dept: POST ACUTE CARE | Facility: EXTERNAL LOCATION | Age: 75
End: 2025-03-23
Payer: MEDICARE

## 2025-03-23 DIAGNOSIS — J44.9 CHRONIC OBSTRUCTIVE PULMONARY DISEASE, UNSPECIFIED COPD TYPE (MULTI): ICD-10-CM

## 2025-03-23 DIAGNOSIS — I50.9 CONGESTIVE HEART FAILURE, UNSPECIFIED HF CHRONICITY, UNSPECIFIED HEART FAILURE TYPE: Primary | ICD-10-CM

## 2025-03-23 DIAGNOSIS — Z91.81 AT RISK FOR FALLS: ICD-10-CM

## 2025-03-23 DIAGNOSIS — R41.89 COGNITIVE DECLINE: ICD-10-CM

## 2025-03-23 DIAGNOSIS — Z79.4 TYPE 2 DIABETES MELLITUS WITHOUT COMPLICATION, WITH LONG-TERM CURRENT USE OF INSULIN: ICD-10-CM

## 2025-03-23 DIAGNOSIS — F32.A DEPRESSION, UNSPECIFIED DEPRESSION TYPE: ICD-10-CM

## 2025-03-23 DIAGNOSIS — F41.9 ANXIETY: ICD-10-CM

## 2025-03-23 DIAGNOSIS — I48.91 ATRIAL FIBRILLATION, UNSPECIFIED TYPE (MULTI): ICD-10-CM

## 2025-03-23 DIAGNOSIS — E11.9 TYPE 2 DIABETES MELLITUS WITHOUT COMPLICATION, WITH LONG-TERM CURRENT USE OF INSULIN: ICD-10-CM

## 2025-03-23 DIAGNOSIS — R53.1 WEAKNESS: ICD-10-CM

## 2025-03-23 DIAGNOSIS — I10 BENIGN HYPERTENSION: ICD-10-CM

## 2025-03-23 DIAGNOSIS — K21.9 GASTROESOPHAGEAL REFLUX DISEASE WITHOUT ESOPHAGITIS: ICD-10-CM

## 2025-03-23 PROCEDURE — 99309 SBSQ NF CARE MODERATE MDM 30: CPT | Performed by: INTERNAL MEDICINE

## 2025-03-23 NOTE — LETTER
Patient: Lucas Valderrama  : 1950    Encounter Date: 2025    PLACE OF SERVICE:  [facility].    This is a subsequent visit.    Subjective  Patient ID: Lucas Valderrama is a 75 y.o. male who presents for Follow-up.    Mr. Lucas Valderrama is a 75-year-old male with history of COPD and heart failure.  He is a diabetic.  He is unable to care for himself.  He requires supportive care.    Review of Systems   Constitutional:  Negative for chills and fever.   Cardiovascular:  Negative for chest pain.   All other systems reviewed and are negative.    Objective  /80   Pulse 82   Temp 36.5 °C (97.7 °F)   Resp 16     Physical Exam  Vitals reviewed.   Constitutional:       Comments: This is a well-developed, well-nourished male, lying in bed appearing weak.   HENT:      Right Ear: Tympanic membrane, ear canal and external ear normal.      Left Ear: Tympanic membrane, ear canal and external ear normal.   Eyes:      General: No scleral icterus.     Pupils: Pupils are equal, round, and reactive to light.   Neck:      Vascular: No carotid bruit.   Cardiovascular:      Heart sounds: Normal heart sounds, S1 normal and S2 normal. No murmur heard.     No friction rub.   Pulmonary:      Effort: Pulmonary effort is normal.      Breath sounds: Decreased breath sounds (throughout) present.   Abdominal:      Palpations: There is no hepatomegaly, splenomegaly or mass.   Musculoskeletal:         General: No swelling or deformity. Normal range of motion.      Cervical back: Neck supple.      Right lower leg: Edema (trace) present.      Left lower leg: Edema (trace) present.   Lymphadenopathy:      Cervical: No cervical adenopathy.      Upper Body:      Right upper body: No axillary adenopathy.      Left upper body: No axillary adenopathy.      Lower Body: No right inguinal adenopathy. No left inguinal adenopathy.   Neurological:      Mental Status: He is oriented to person, place, and time. He is lethargic.       Cranial Nerves: Cranial nerves 2-12 are intact. No cranial nerve deficit.      Sensory: No sensory deficit.      Motor: Motor function is intact. No weakness.      Gait: Gait is intact.      Deep Tendon Reflexes: Reflexes normal.   Psychiatric:         Mood and Affect: Mood normal. Mood is not anxious or depressed. Affect is not angry.         Behavior: Behavior is not agitated.         Thought Content: Thought content normal.         Judgment: Judgment normal.     LAB WORK:  Laboratory studies were reviewed.    Assessment/Plan  Problem List Items Addressed This Visit             ICD-10-CM       Advance Directives and General Issues    At risk for falls Z91.81       Cardiac and Vasculature    Atrial fibrillation (Multi) I48.91    Benign hypertension I10    Congestive heart failure - Primary I50.9       Gastrointestinal and Abdominal    Gastroesophageal reflux disease without esophagitis K21.9       Mental Health    Depression F32.A    Anxiety F41.9     Other Visit Diagnoses         Codes    Chronic obstructive pulmonary disease, unspecified COPD type (Multi)     J44.9    Type 2 diabetes mellitus without complication, with long-term current use of insulin (Multi)     E11.9, Z79.4    Cognitive decline     R41.89    Weakness     R53.1        1. Heart failure, on diuretic.  2. COPD, on bronchodilator therapy.  3. Atrial fibrillation, rate controlled.  4. Diabetes, on insulin.  5. Hypertension, medically controlled.  6. Cognitive decline, on supportive care.  7. Depression, on medication.  8. Anxiety, on medication.  9. GERD, on PPI.  10. Weakness, on PT/OT.  11. Fall risk, on fall precautions.    Scribe Attestation  By signing my name below, ILauren Scribe attest that this documentation has been prepared under the direction and in the presence of Santiago Hernandez MD.     All medical record entries made by the scribe were personally dictated by me I have reviewed the chart and agree the record accurately reflects  my personal performance of his history physical examination and management      Electronically Signed By: Santiago Hernandez MD   3/30/25 11:14 PM

## 2025-03-24 VITALS
SYSTOLIC BLOOD PRESSURE: 130 MMHG | RESPIRATION RATE: 16 BRPM | HEART RATE: 82 BPM | TEMPERATURE: 97.7 F | DIASTOLIC BLOOD PRESSURE: 80 MMHG

## 2025-03-24 ASSESSMENT — ENCOUNTER SYMPTOMS
CHILLS: 0
FEVER: 0

## 2025-03-24 NOTE — PROGRESS NOTES
PLACE OF SERVICE:  [facility].    This is a subsequent visit.    Subjective   Patient ID: Lucas Valderrama is a 75 y.o. male who presents for Follow-up.    Mr. Lucas Valderrama is a 75-year-old male with history of COPD and heart failure.  He is a diabetic.  He is unable to care for himself.  He requires supportive care.    Review of Systems   Constitutional:  Negative for chills and fever.   Cardiovascular:  Negative for chest pain.   All other systems reviewed and are negative.    Objective   /80   Pulse 82   Temp 36.5 °C (97.7 °F)   Resp 16     Physical Exam  Vitals reviewed.   Constitutional:       Comments: This is a well-developed, well-nourished male, lying in bed appearing weak.   HENT:      Right Ear: Tympanic membrane, ear canal and external ear normal.      Left Ear: Tympanic membrane, ear canal and external ear normal.   Eyes:      General: No scleral icterus.     Pupils: Pupils are equal, round, and reactive to light.   Neck:      Vascular: No carotid bruit.   Cardiovascular:      Heart sounds: Normal heart sounds, S1 normal and S2 normal. No murmur heard.     No friction rub.   Pulmonary:      Effort: Pulmonary effort is normal.      Breath sounds: Decreased breath sounds (throughout) present.   Abdominal:      Palpations: There is no hepatomegaly, splenomegaly or mass.   Musculoskeletal:         General: No swelling or deformity. Normal range of motion.      Cervical back: Neck supple.      Right lower leg: Edema (trace) present.      Left lower leg: Edema (trace) present.   Lymphadenopathy:      Cervical: No cervical adenopathy.      Upper Body:      Right upper body: No axillary adenopathy.      Left upper body: No axillary adenopathy.      Lower Body: No right inguinal adenopathy. No left inguinal adenopathy.   Neurological:      Mental Status: He is oriented to person, place, and time. He is lethargic.      Cranial Nerves: Cranial nerves 2-12 are intact. No cranial nerve deficit.       Sensory: No sensory deficit.      Motor: Motor function is intact. No weakness.      Gait: Gait is intact.      Deep Tendon Reflexes: Reflexes normal.   Psychiatric:         Mood and Affect: Mood normal. Mood is not anxious or depressed. Affect is not angry.         Behavior: Behavior is not agitated.         Thought Content: Thought content normal.         Judgment: Judgment normal.     LAB WORK:  Laboratory studies were reviewed.    Assessment/Plan   Problem List Items Addressed This Visit             ICD-10-CM       Advance Directives and General Issues    At risk for falls Z91.81       Cardiac and Vasculature    Atrial fibrillation (Multi) I48.91    Benign hypertension I10    Congestive heart failure - Primary I50.9       Gastrointestinal and Abdominal    Gastroesophageal reflux disease without esophagitis K21.9       Mental Health    Depression F32.A    Anxiety F41.9     Other Visit Diagnoses         Codes    Chronic obstructive pulmonary disease, unspecified COPD type (Multi)     J44.9    Type 2 diabetes mellitus without complication, with long-term current use of insulin (Multi)     E11.9, Z79.4    Cognitive decline     R41.89    Weakness     R53.1        1. Heart failure, on diuretic.  2. COPD, on bronchodilator therapy.  3. Atrial fibrillation, rate controlled.  4. Diabetes, on insulin.  5. Hypertension, medically controlled.  6. Cognitive decline, on supportive care.  7. Depression, on medication.  8. Anxiety, on medication.  9. GERD, on PPI.  10. Weakness, on PT/OT.  11. Fall risk, on fall precautions.    Scribe Attestation  By signing my name below, ILauren Scribe attest that this documentation has been prepared under the direction and in the presence of Santiago Hernandez MD.     All medical record entries made by the scribe were personally dictated by me I have reviewed the chart and agree the record accurately reflects my personal performance of his history physical examination and  management

## 2025-03-29 ENCOUNTER — NURSING HOME VISIT (OUTPATIENT)
Dept: POST ACUTE CARE | Facility: EXTERNAL LOCATION | Age: 75
End: 2025-03-29
Payer: MEDICARE

## 2025-03-29 DIAGNOSIS — Z79.4 TYPE 2 DIABETES MELLITUS WITHOUT COMPLICATION, WITH LONG-TERM CURRENT USE OF INSULIN: ICD-10-CM

## 2025-03-29 DIAGNOSIS — R53.1 WEAKNESS: ICD-10-CM

## 2025-03-29 DIAGNOSIS — F32.A DEPRESSION, UNSPECIFIED DEPRESSION TYPE: ICD-10-CM

## 2025-03-29 DIAGNOSIS — Z91.81 AT RISK FOR FALLS: ICD-10-CM

## 2025-03-29 DIAGNOSIS — K21.9 GASTROESOPHAGEAL REFLUX DISEASE WITHOUT ESOPHAGITIS: ICD-10-CM

## 2025-03-29 DIAGNOSIS — J44.9 CHRONIC OBSTRUCTIVE PULMONARY DISEASE, UNSPECIFIED COPD TYPE (MULTI): Primary | ICD-10-CM

## 2025-03-29 DIAGNOSIS — I48.91 ATRIAL FIBRILLATION, UNSPECIFIED TYPE (MULTI): ICD-10-CM

## 2025-03-29 DIAGNOSIS — I10 BENIGN HYPERTENSION: ICD-10-CM

## 2025-03-29 DIAGNOSIS — E11.9 TYPE 2 DIABETES MELLITUS WITHOUT COMPLICATION, WITH LONG-TERM CURRENT USE OF INSULIN: ICD-10-CM

## 2025-03-29 DIAGNOSIS — F41.9 ANXIETY: ICD-10-CM

## 2025-03-29 DIAGNOSIS — I50.9 CONGESTIVE HEART FAILURE, UNSPECIFIED HF CHRONICITY, UNSPECIFIED HEART FAILURE TYPE: ICD-10-CM

## 2025-03-29 DIAGNOSIS — R41.89 COGNITIVE DECLINE: ICD-10-CM

## 2025-03-29 PROCEDURE — 99309 SBSQ NF CARE MODERATE MDM 30: CPT | Performed by: INTERNAL MEDICINE

## 2025-03-29 NOTE — LETTER
Patient: Lucas Valderrama  : 1950    Encounter Date: 2025    PLACE OF SERVICE:  [facility]    This is a subsequent visit.    Subjective  Patient ID: Lucas Valderrama is a 75 y.o. male who presents for Follow-up.    Mr. Lucas Valderrama is a 75-year-old male with history of COPD with heart failure.  He is a diabetic.  He is unable to care for himself.  He requires supportive care.    Review of Systems   Constitutional:  Negative for chills and fever.   Cardiovascular:  Negative for chest pain.   All other systems reviewed and are negative.    Objective  /80   Pulse 82   Temp 36.8 °C (98.2 °F)   Resp 18     Physical Exam  Vitals reviewed.   Constitutional:       Comments: This is a well-developed, well-nourished male, lying in bed, appearing weak.   HENT:      Right Ear: Tympanic membrane, ear canal and external ear normal.      Left Ear: Tympanic membrane, ear canal and external ear normal.   Eyes:      General: No scleral icterus.     Pupils: Pupils are equal, round, and reactive to light.   Neck:      Vascular: No carotid bruit.   Cardiovascular:      Heart sounds: Normal heart sounds, S1 normal and S2 normal. No murmur heard.     No friction rub.   Pulmonary:      Breath sounds: Decreased breath sounds (throughout) present.   Abdominal:      Palpations: There is no hepatomegaly, splenomegaly or mass.   Musculoskeletal:         General: No swelling or deformity. Normal range of motion.      Cervical back: Neck supple.      Right lower leg: Edema (trace) present.      Left lower leg: Edema (trace) present.   Lymphadenopathy:      Cervical: No cervical adenopathy.      Upper Body:      Right upper body: No axillary adenopathy.      Left upper body: No axillary adenopathy.      Lower Body: No right inguinal adenopathy. No left inguinal adenopathy.   Neurological:      Mental Status: He is oriented to person, place, and time. He is lethargic.      Cranial Nerves: Cranial nerves 2-12 are  intact. No cranial nerve deficit.      Sensory: No sensory deficit.      Motor: Motor function is intact. No weakness.      Gait: Gait is intact.      Deep Tendon Reflexes: Reflexes normal.   Psychiatric:         Mood and Affect: Mood normal. Mood is not anxious or depressed. Affect is not angry.         Behavior: Behavior is not agitated.         Thought Content: Thought content normal.         Judgment: Judgment normal.     LAB WORK:  Laboratory studies were reviewed.    Assessment/Plan  Problem List Items Addressed This Visit             ICD-10-CM    Depression F32.A    Atrial fibrillation (Multi) I48.91    Benign hypertension I10    Gastroesophageal reflux disease without esophagitis K21.9    Congestive heart failure I50.9    At risk for falls Z91.81    Anxiety F41.9     Other Visit Diagnoses         Codes    Chronic obstructive pulmonary disease, unspecified COPD type (Multi)    -  Primary J44.9    Type 2 diabetes mellitus without complication, with long-term current use of insulin (Multi)     E11.9, Z79.4    Cognitive decline     R41.89    Weakness     R53.1        1. COPD, on bronchodilator therapy.  2. Heart failure, on diuretic.  3. Diabetes, on insulin.  4. Depression, on medication.  5. Atrial fibrillation, on rate control.  6. Hypertension, medically controlled.  7. GERD, on PPI.  8. Cognitive decline, on supportive care.  9. Anxiety, on medication.  10. Weakness, on PT/OT.  11. Fall risk, on fall precautions.    Scribe Attestation  By signing my name below, IMarion Scribe attest that this documentation has been prepared under the direction and in the presence of Santiago Hernandez MD.     All medical record entries made by the scribe were personally dictated by me I have reviewed the chart and agree the record accurately reflects my personal performance of his history physical examination and management      Electronically Signed By: Santiago Hernandez MD   4/8/25 12:34 AM

## 2025-03-31 VITALS
TEMPERATURE: 98.2 F | DIASTOLIC BLOOD PRESSURE: 80 MMHG | RESPIRATION RATE: 18 BRPM | SYSTOLIC BLOOD PRESSURE: 126 MMHG | HEART RATE: 82 BPM

## 2025-03-31 ASSESSMENT — ENCOUNTER SYMPTOMS
CHILLS: 0
FEVER: 0

## 2025-03-31 NOTE — PROGRESS NOTES
PLACE OF SERVICE:  [facility]    This is a subsequent visit.    Subjective   Patient ID: Lucas Valderrama is a 75 y.o. male who presents for Follow-up.    Mr. Lucas Valderrama is a 75-year-old male with history of COPD with heart failure.  He is a diabetic.  He is unable to care for himself.  He requires supportive care.    Review of Systems   Constitutional:  Negative for chills and fever.   Cardiovascular:  Negative for chest pain.   All other systems reviewed and are negative.    Objective   /80   Pulse 82   Temp 36.8 °C (98.2 °F)   Resp 18     Physical Exam  Vitals reviewed.   Constitutional:       Comments: This is a well-developed, well-nourished male, lying in bed, appearing weak.   HENT:      Right Ear: Tympanic membrane, ear canal and external ear normal.      Left Ear: Tympanic membrane, ear canal and external ear normal.   Eyes:      General: No scleral icterus.     Pupils: Pupils are equal, round, and reactive to light.   Neck:      Vascular: No carotid bruit.   Cardiovascular:      Heart sounds: Normal heart sounds, S1 normal and S2 normal. No murmur heard.     No friction rub.   Pulmonary:      Breath sounds: Decreased breath sounds (throughout) present.   Abdominal:      Palpations: There is no hepatomegaly, splenomegaly or mass.   Musculoskeletal:         General: No swelling or deformity. Normal range of motion.      Cervical back: Neck supple.      Right lower leg: Edema (trace) present.      Left lower leg: Edema (trace) present.   Lymphadenopathy:      Cervical: No cervical adenopathy.      Upper Body:      Right upper body: No axillary adenopathy.      Left upper body: No axillary adenopathy.      Lower Body: No right inguinal adenopathy. No left inguinal adenopathy.   Neurological:      Mental Status: He is oriented to person, place, and time. He is lethargic.      Cranial Nerves: Cranial nerves 2-12 are intact. No cranial nerve deficit.      Sensory: No sensory deficit.       Motor: Motor function is intact. No weakness.      Gait: Gait is intact.      Deep Tendon Reflexes: Reflexes normal.   Psychiatric:         Mood and Affect: Mood normal. Mood is not anxious or depressed. Affect is not angry.         Behavior: Behavior is not agitated.         Thought Content: Thought content normal.         Judgment: Judgment normal.     LAB WORK:  Laboratory studies were reviewed.    Assessment/Plan   Problem List Items Addressed This Visit             ICD-10-CM    Depression F32.A    Atrial fibrillation (Multi) I48.91    Benign hypertension I10    Gastroesophageal reflux disease without esophagitis K21.9    Congestive heart failure I50.9    At risk for falls Z91.81    Anxiety F41.9     Other Visit Diagnoses         Codes    Chronic obstructive pulmonary disease, unspecified COPD type (Multi)    -  Primary J44.9    Type 2 diabetes mellitus without complication, with long-term current use of insulin (Multi)     E11.9, Z79.4    Cognitive decline     R41.89    Weakness     R53.1        1. COPD, on bronchodilator therapy.  2. Heart failure, on diuretic.  3. Diabetes, on insulin.  4. Depression, on medication.  5. Atrial fibrillation, on rate control.  6. Hypertension, medically controlled.  7. GERD, on PPI.  8. Cognitive decline, on supportive care.  9. Anxiety, on medication.  10. Weakness, on PT/OT.  11. Fall risk, on fall precautions.    Scribe Attestation  By signing my name below, IMarion Scribe attest that this documentation has been prepared under the direction and in the presence of Santiago Hernandez MD.     All medical record entries made by the scribe were personally dictated by me I have reviewed the chart and agree the record accurately reflects my personal performance of his history physical examination and management

## 2025-04-13 ENCOUNTER — NURSING HOME VISIT (OUTPATIENT)
Dept: POST ACUTE CARE | Facility: EXTERNAL LOCATION | Age: 75
End: 2025-04-13
Payer: MEDICARE

## 2025-04-13 DIAGNOSIS — I50.9 CONGESTIVE HEART FAILURE, UNSPECIFIED HF CHRONICITY, UNSPECIFIED HEART FAILURE TYPE: ICD-10-CM

## 2025-04-13 DIAGNOSIS — Z79.4 TYPE 2 DIABETES MELLITUS WITHOUT COMPLICATION, WITH LONG-TERM CURRENT USE OF INSULIN: Primary | ICD-10-CM

## 2025-04-13 DIAGNOSIS — R53.1 WEAKNESS: ICD-10-CM

## 2025-04-13 DIAGNOSIS — F41.9 ANXIETY: ICD-10-CM

## 2025-04-13 DIAGNOSIS — R41.89 COGNITIVE DECLINE: ICD-10-CM

## 2025-04-13 DIAGNOSIS — I10 BENIGN HYPERTENSION: ICD-10-CM

## 2025-04-13 DIAGNOSIS — E11.9 TYPE 2 DIABETES MELLITUS WITHOUT COMPLICATION, WITH LONG-TERM CURRENT USE OF INSULIN: Primary | ICD-10-CM

## 2025-04-13 DIAGNOSIS — F32.A DEPRESSION, UNSPECIFIED DEPRESSION TYPE: ICD-10-CM

## 2025-04-13 DIAGNOSIS — K21.9 GASTROESOPHAGEAL REFLUX DISEASE WITHOUT ESOPHAGITIS: ICD-10-CM

## 2025-04-13 DIAGNOSIS — Z91.81 AT RISK FOR FALLS: ICD-10-CM

## 2025-04-13 DIAGNOSIS — J44.9 CHRONIC OBSTRUCTIVE PULMONARY DISEASE, UNSPECIFIED COPD TYPE (MULTI): ICD-10-CM

## 2025-04-13 DIAGNOSIS — I48.91 ATRIAL FIBRILLATION, UNSPECIFIED TYPE (MULTI): ICD-10-CM

## 2025-04-13 PROCEDURE — 99308 SBSQ NF CARE LOW MDM 20: CPT | Performed by: INTERNAL MEDICINE

## 2025-04-13 NOTE — LETTER
Patient: Lucas Valderrama  : 1950    Encounter Date: 2025    PLACE OF SERVICE: [facility] [place].    This is a subsequent visit.    Subjective  Patient ID: Lucas Valderrama is a 75 y.o. male who presents for Follow-up.    Mr. Lucas Valderrama is a 75-year-old diabetic male with history of heart failure and COPD.  He is unable to care for himself and requires supportive care.    Review of Systems   Constitutional:  Negative for chills and fever.   Cardiovascular:  Negative for chest pain.   All other systems reviewed and are negative.    Objective  /82   Pulse 80   Temp 36.7 °C (98.1 °F)   Resp 18     Physical Exam  Vitals reviewed.   Constitutional:       Comments: This is a well-developed and well-nourished male, lying in bed, appearing weak.   HENT:      Right Ear: Tympanic membrane, ear canal and external ear normal.      Left Ear: Tympanic membrane, ear canal and external ear normal.   Eyes:      General: No scleral icterus.     Pupils: Pupils are equal, round, and reactive to light.   Neck:      Vascular: No carotid bruit.   Cardiovascular:      Heart sounds: Normal heart sounds, S1 normal and S2 normal. No murmur heard.     No friction rub.   Pulmonary:      Effort: Pulmonary effort is normal.      Breath sounds: Decreased breath sounds (throughout) present.   Abdominal:      Palpations: There is no hepatomegaly, splenomegaly or mass.   Musculoskeletal:         General: No swelling or deformity. Normal range of motion.      Cervical back: Neck supple.      Right lower leg: Edema (trace) present.      Left lower leg: Edema (trace) present.   Lymphadenopathy:      Cervical: No cervical adenopathy.      Upper Body:      Right upper body: No axillary adenopathy.      Left upper body: No axillary adenopathy.      Lower Body: No right inguinal adenopathy. No left inguinal adenopathy.   Neurological:      Mental Status: He is oriented to person, place, and time. He is lethargic.      Cranial  Nerves: Cranial nerves 2-12 are intact. No cranial nerve deficit.      Sensory: No sensory deficit.      Motor: Motor function is intact. No weakness.      Gait: Gait is intact.      Deep Tendon Reflexes: Reflexes normal.   Psychiatric:         Mood and Affect: Mood normal. Mood is not anxious or depressed. Affect is not angry.         Behavior: Behavior is not agitated.         Thought Content: Thought content normal.         Judgment: Judgment normal.     LAB WORK: Laboratory studies were reviewed.    Assessment/Plan  Problem List Items Addressed This Visit             ICD-10-CM       Advance Directives and General Issues    At risk for falls Z91.81       Cardiac and Vasculature    Atrial fibrillation (Multi) I48.91    Benign hypertension I10    Congestive heart failure I50.9       Gastrointestinal and Abdominal    Gastroesophageal reflux disease without esophagitis K21.9       Mental Health    Depression F32.A    Anxiety F41.9     Other Visit Diagnoses         Codes    Type 2 diabetes mellitus without complication, with long-term current use of insulin    -  Primary E11.9, Z79.4    Chronic obstructive pulmonary disease, unspecified COPD type (Multi)     J44.9    Cognitive decline     R41.89    Weakness     R53.1        1. Diabetes, on insulin.  2. Heart failure, on diuretic.  3. COPD, on bronchodilator therapy.  4. Atrial fibrillation, rate controlled.  5. Hypertension, med controlled.  6. Depression, on medication.  7. Cognitive decline, on supportive care.  8. Anxiety, on medication.  9. GERD, on PPI.  10. Weakness, on PT/OT.  11. Fall risk, on fall precaution.    Scribe Attestation  By signing my name below, I, Keshav Simmons attest that this documentation has been prepared under the direction and in the presence of Santiago Hernandez MD.     All medical record entries made by the scribe were personally dictated by me I have reviewed the chart and agree the record accurately reflects my personal performance  of his history physical examination and management      Electronically Signed By: Santiago Hernandez MD   4/20/25 10:38 PM

## 2025-04-14 VITALS
TEMPERATURE: 98.1 F | DIASTOLIC BLOOD PRESSURE: 82 MMHG | RESPIRATION RATE: 18 BRPM | HEART RATE: 80 BPM | SYSTOLIC BLOOD PRESSURE: 130 MMHG

## 2025-04-14 ASSESSMENT — ENCOUNTER SYMPTOMS
CHILLS: 0
FEVER: 0

## 2025-04-14 NOTE — PROGRESS NOTES
PLACE OF SERVICE: [facility].    This is a subsequent visit.    Subjective   Patient ID: Lucas Valderrama is a 75 y.o. male who presents for Follow-up.    Mr. Lucas Valderrama is a 75-year-old diabetic male with history of heart failure and COPD.  He is unable to care for himself and requires supportive care.    Review of Systems   Constitutional:  Negative for chills and fever.   Cardiovascular:  Negative for chest pain.   All other systems reviewed and are negative.    Objective   /82   Pulse 80   Temp 36.7 °C (98.1 °F)   Resp 18     Physical Exam  Vitals reviewed.   Constitutional:       Comments: This is a well-developed and well-nourished male, lying in bed, appearing weak.   HENT:      Right Ear: Tympanic membrane, ear canal and external ear normal.      Left Ear: Tympanic membrane, ear canal and external ear normal.   Eyes:      General: No scleral icterus.     Pupils: Pupils are equal, round, and reactive to light.   Neck:      Vascular: No carotid bruit.   Cardiovascular:      Heart sounds: Normal heart sounds, S1 normal and S2 normal. No murmur heard.     No friction rub.   Pulmonary:      Effort: Pulmonary effort is normal.      Breath sounds: Decreased breath sounds (throughout) present.   Abdominal:      Palpations: There is no hepatomegaly, splenomegaly or mass.   Musculoskeletal:         General: No swelling or deformity. Normal range of motion.      Cervical back: Neck supple.      Right lower leg: Edema (trace) present.      Left lower leg: Edema (trace) present.   Lymphadenopathy:      Cervical: No cervical adenopathy.      Upper Body:      Right upper body: No axillary adenopathy.      Left upper body: No axillary adenopathy.      Lower Body: No right inguinal adenopathy. No left inguinal adenopathy.   Neurological:      Mental Status: He is oriented to person, place, and time. He is lethargic.      Cranial Nerves: Cranial nerves 2-12 are intact. No cranial nerve deficit.       Sensory: No sensory deficit.      Motor: Motor function is intact. No weakness.      Gait: Gait is intact.      Deep Tendon Reflexes: Reflexes normal.   Psychiatric:         Mood and Affect: Mood normal. Mood is not anxious or depressed. Affect is not angry.         Behavior: Behavior is not agitated.         Thought Content: Thought content normal.         Judgment: Judgment normal.     LAB WORK: Laboratory studies were reviewed.    Assessment/Plan   Problem List Items Addressed This Visit             ICD-10-CM       Advance Directives and General Issues    At risk for falls Z91.81       Cardiac and Vasculature    Atrial fibrillation (Multi) I48.91    Benign hypertension I10    Congestive heart failure I50.9       Gastrointestinal and Abdominal    Gastroesophageal reflux disease without esophagitis K21.9       Mental Health    Depression F32.A    Anxiety F41.9     Other Visit Diagnoses         Codes    Type 2 diabetes mellitus without complication, with long-term current use of insulin    -  Primary E11.9, Z79.4    Chronic obstructive pulmonary disease, unspecified COPD type (Multi)     J44.9    Cognitive decline     R41.89    Weakness     R53.1        1. Diabetes, on insulin.  2. Heart failure, on diuretic.  3. COPD, on bronchodilator therapy.  4. Atrial fibrillation, rate controlled.  5. Hypertension, med controlled.  6. Depression, on medication.  7. Cognitive decline, on supportive care.  8. Anxiety, on medication.  9. GERD, on PPI.  10. Weakness, on PT/OT.  11. Fall risk, on fall precaution.    Scribe Attestation  By signing my name below, ILauren Scribe attest that this documentation has been prepared under the direction and in the presence of Santiago Hernandez MD.     All medical record entries made by the scribe were personally dictated by me I have reviewed the chart and agree the record accurately reflects my personal performance of his history physical examination and management

## 2025-05-03 ENCOUNTER — NURSING HOME VISIT (OUTPATIENT)
Dept: POST ACUTE CARE | Facility: EXTERNAL LOCATION | Age: 75
End: 2025-05-03
Payer: MEDICARE

## 2025-05-03 DIAGNOSIS — I10 BENIGN HYPERTENSION: ICD-10-CM

## 2025-05-03 DIAGNOSIS — K21.9 GASTROESOPHAGEAL REFLUX DISEASE WITHOUT ESOPHAGITIS: ICD-10-CM

## 2025-05-03 DIAGNOSIS — I50.9 CONGESTIVE HEART FAILURE, UNSPECIFIED HF CHRONICITY, UNSPECIFIED HEART FAILURE TYPE: ICD-10-CM

## 2025-05-03 DIAGNOSIS — R41.89 COGNITIVE DECLINE: ICD-10-CM

## 2025-05-03 DIAGNOSIS — F32.A DEPRESSION, UNSPECIFIED DEPRESSION TYPE: ICD-10-CM

## 2025-05-03 DIAGNOSIS — J44.9 CHRONIC OBSTRUCTIVE PULMONARY DISEASE, UNSPECIFIED COPD TYPE (MULTI): Primary | ICD-10-CM

## 2025-05-03 DIAGNOSIS — E11.9 TYPE 2 DIABETES MELLITUS WITHOUT COMPLICATION, WITH LONG-TERM CURRENT USE OF INSULIN: ICD-10-CM

## 2025-05-03 DIAGNOSIS — Z79.4 TYPE 2 DIABETES MELLITUS WITHOUT COMPLICATION, WITH LONG-TERM CURRENT USE OF INSULIN: ICD-10-CM

## 2025-05-03 DIAGNOSIS — F41.9 ANXIETY: ICD-10-CM

## 2025-05-03 DIAGNOSIS — Z91.81 AT RISK FOR FALLS: ICD-10-CM

## 2025-05-03 DIAGNOSIS — I48.91 ATRIAL FIBRILLATION, UNSPECIFIED TYPE (MULTI): ICD-10-CM

## 2025-05-03 PROCEDURE — 99309 SBSQ NF CARE MODERATE MDM 30: CPT | Performed by: INTERNAL MEDICINE

## 2025-05-03 NOTE — LETTER
Patient: Lucas Valderrama  : 1950    Encounter Date: 2025    PLACE OF SERVICE:  [facility].    This is a subsequent visit.    Subjective  Patient ID: Lucas Valderrama is a 75 y.o. male who presents for Follow-up.    Mr. Lucas Valderrama is a 75-year-old male with history of COPD and heart failure.  He is a diabetic.  He is unable to care for himself.  He requires supportive care.    Review of Systems   Constitutional:  Negative for chills and fever.   Cardiovascular:  Negative for chest pain.   All other systems reviewed and are negative.    Objective  /80   Pulse 78   Temp 36.7 °C (98.1 °F)   Resp 18     Physical Exam  Vitals reviewed.   Constitutional:       Comments: This is a well-developed, well-nourished male, lying in bed, appearing weak.   HENT:      Right Ear: Tympanic membrane, ear canal and external ear normal.      Left Ear: Tympanic membrane, ear canal and external ear normal.   Eyes:      General: No scleral icterus.     Pupils: Pupils are equal, round, and reactive to light.   Neck:      Vascular: No carotid bruit.   Cardiovascular:      Heart sounds: Normal heart sounds, S1 normal and S2 normal. No murmur heard.     No friction rub.   Pulmonary:      Effort: Pulmonary effort is normal.      Breath sounds: Decreased breath sounds (throughout) present.   Abdominal:      Palpations: There is no hepatomegaly, splenomegaly or mass.   Musculoskeletal:         General: No swelling or deformity. Normal range of motion.      Cervical back: Neck supple.      Right lower leg: Edema (trace) present.      Left lower leg: Edema (trace) present.   Lymphadenopathy:      Cervical: No cervical adenopathy.      Upper Body:      Right upper body: No axillary adenopathy.      Left upper body: No axillary adenopathy.      Lower Body: No right inguinal adenopathy. No left inguinal adenopathy.   Neurological:      Mental Status: He is oriented to person, place, and time. He is lethargic.       Cranial Nerves: Cranial nerves 2-12 are intact. No cranial nerve deficit.      Sensory: No sensory deficit.      Motor: Motor function is intact. No weakness.      Gait: Gait is intact.      Deep Tendon Reflexes: Reflexes normal.   Psychiatric:         Mood and Affect: Mood normal. Mood is not anxious or depressed. Affect is not angry.         Behavior: Behavior is not agitated.         Thought Content: Thought content normal.         Judgment: Judgment normal.     LAB WORK:  Laboratory studies were reviewed.    Assessment/Plan  Problem List Items Addressed This Visit           ICD-10-CM       Advance Directives and General Issues    At risk for falls Z91.81       Cardiac and Vasculature    Atrial fibrillation (Multi) I48.91    Benign hypertension I10    Congestive heart failure I50.9       Gastrointestinal and Abdominal    Gastroesophageal reflux disease without esophagitis K21.9       Mental Health    Depression F32.A    Anxiety F41.9     Other Visit Diagnoses         Codes      Chronic obstructive pulmonary disease, unspecified COPD type (Multi)    -  Primary J44.9      Type 2 diabetes mellitus without complication, with long-term current use of insulin     E11.9, Z79.4      Cognitive decline     R41.89        1. COPD, on bronchodilator therapy.  2. Atrial fibrillation, on rate control.  3. Heart failure, on diuretic.  4. Diabetes on insulin.  5. Depression, on medication.  6. Hypertension, medically controlled.  7. Anxiety on medication.  8. GERD, on PPI.  9. Cognitive decline, on supportive care.  10. Fall risk, on fall precautions.    Scribe Attestation  By signing my name below, ILauren Scribe attest that this documentation has been prepared under the direction and in the presence of Santiago Hernandez MD.     All medical record entries made by the scribe were personally dictated by me I have reviewed the chart and agree the record accurately reflects my personal performance of his history physical  examination and management      Electronically Signed By: Santiago Hernandez MD   5/6/25 11:36 PM

## 2025-05-05 VITALS
SYSTOLIC BLOOD PRESSURE: 130 MMHG | RESPIRATION RATE: 18 BRPM | DIASTOLIC BLOOD PRESSURE: 80 MMHG | HEART RATE: 78 BPM | TEMPERATURE: 98.1 F

## 2025-05-05 ASSESSMENT — ENCOUNTER SYMPTOMS
CHILLS: 0
FEVER: 0

## 2025-05-05 NOTE — PROGRESS NOTES
PLACE OF SERVICE:  [facility].    This is a subsequent visit.    Subjective   Patient ID: Lucas Valderrama is a 75 y.o. male who presents for Follow-up.    Mr. Lucas Valderrama is a 75-year-old male with history of COPD and heart failure.  He is a diabetic.  He is unable to care for himself.  He requires supportive care.    Review of Systems   Constitutional:  Negative for chills and fever.   Cardiovascular:  Negative for chest pain.   All other systems reviewed and are negative.    Objective   /80   Pulse 78   Temp 36.7 °C (98.1 °F)   Resp 18     Physical Exam  Vitals reviewed.   Constitutional:       Comments: This is a well-developed, well-nourished male, lying in bed, appearing weak.   HENT:      Right Ear: Tympanic membrane, ear canal and external ear normal.      Left Ear: Tympanic membrane, ear canal and external ear normal.   Eyes:      General: No scleral icterus.     Pupils: Pupils are equal, round, and reactive to light.   Neck:      Vascular: No carotid bruit.   Cardiovascular:      Heart sounds: Normal heart sounds, S1 normal and S2 normal. No murmur heard.     No friction rub.   Pulmonary:      Effort: Pulmonary effort is normal.      Breath sounds: Decreased breath sounds (throughout) present.   Abdominal:      Palpations: There is no hepatomegaly, splenomegaly or mass.   Musculoskeletal:         General: No swelling or deformity. Normal range of motion.      Cervical back: Neck supple.      Right lower leg: Edema (trace) present.      Left lower leg: Edema (trace) present.   Lymphadenopathy:      Cervical: No cervical adenopathy.      Upper Body:      Right upper body: No axillary adenopathy.      Left upper body: No axillary adenopathy.      Lower Body: No right inguinal adenopathy. No left inguinal adenopathy.   Neurological:      Mental Status: He is oriented to person, place, and time. He is lethargic.      Cranial Nerves: Cranial nerves 2-12 are intact. No cranial nerve deficit.       Sensory: No sensory deficit.      Motor: Motor function is intact. No weakness.      Gait: Gait is intact.      Deep Tendon Reflexes: Reflexes normal.   Psychiatric:         Mood and Affect: Mood normal. Mood is not anxious or depressed. Affect is not angry.         Behavior: Behavior is not agitated.         Thought Content: Thought content normal.         Judgment: Judgment normal.     LAB WORK:  Laboratory studies were reviewed.    Assessment/Plan   Problem List Items Addressed This Visit           ICD-10-CM       Advance Directives and General Issues    At risk for falls Z91.81       Cardiac and Vasculature    Atrial fibrillation (Multi) I48.91    Benign hypertension I10    Congestive heart failure I50.9       Gastrointestinal and Abdominal    Gastroesophageal reflux disease without esophagitis K21.9       Mental Health    Depression F32.A    Anxiety F41.9     Other Visit Diagnoses         Codes      Chronic obstructive pulmonary disease, unspecified COPD type (Multi)    -  Primary J44.9      Type 2 diabetes mellitus without complication, with long-term current use of insulin     E11.9, Z79.4      Cognitive decline     R41.89        1. COPD, on bronchodilator therapy.  2. Atrial fibrillation, on rate control.  3. Heart failure, on diuretic.  4. Diabetes on insulin.  5. Depression, on medication.  6. Hypertension, medically controlled.  7. Anxiety on medication.  8. GERD, on PPI.  9. Cognitive decline, on supportive care.  10. Fall risk, on fall precautions.    Scribe Attestation  By signing my name below, ILauren Scribe attest that this documentation has been prepared under the direction and in the presence of Santiago Hernandez MD.     All medical record entries made by the scribe were personally dictated by me I have reviewed the chart and agree the record accurately reflects my personal performance of his history physical examination and management

## 2025-06-01 ENCOUNTER — NURSING HOME VISIT (OUTPATIENT)
Dept: POST ACUTE CARE | Facility: EXTERNAL LOCATION | Age: 75
End: 2025-06-01
Payer: MEDICARE

## 2025-06-01 DIAGNOSIS — R41.89 COGNITIVE DECLINE: ICD-10-CM

## 2025-06-01 DIAGNOSIS — E11.9 TYPE 2 DIABETES MELLITUS WITHOUT COMPLICATION, WITH LONG-TERM CURRENT USE OF INSULIN: ICD-10-CM

## 2025-06-01 DIAGNOSIS — I50.9 CONGESTIVE HEART FAILURE, UNSPECIFIED HF CHRONICITY, UNSPECIFIED HEART FAILURE TYPE: Primary | ICD-10-CM

## 2025-06-01 DIAGNOSIS — F41.9 ANXIETY: ICD-10-CM

## 2025-06-01 DIAGNOSIS — K21.9 GASTROESOPHAGEAL REFLUX DISEASE WITHOUT ESOPHAGITIS: ICD-10-CM

## 2025-06-01 DIAGNOSIS — R53.1 WEAKNESS: ICD-10-CM

## 2025-06-01 DIAGNOSIS — Z79.4 TYPE 2 DIABETES MELLITUS WITHOUT COMPLICATION, WITH LONG-TERM CURRENT USE OF INSULIN: ICD-10-CM

## 2025-06-01 DIAGNOSIS — F32.A DEPRESSION, UNSPECIFIED DEPRESSION TYPE: ICD-10-CM

## 2025-06-01 DIAGNOSIS — J44.9 CHRONIC OBSTRUCTIVE PULMONARY DISEASE, UNSPECIFIED COPD TYPE (MULTI): ICD-10-CM

## 2025-06-01 DIAGNOSIS — I10 BENIGN HYPERTENSION: ICD-10-CM

## 2025-06-01 DIAGNOSIS — I48.91 ATRIAL FIBRILLATION, UNSPECIFIED TYPE (MULTI): ICD-10-CM

## 2025-06-01 DIAGNOSIS — Z91.81 AT RISK FOR FALLS: ICD-10-CM

## 2025-06-01 PROCEDURE — 99309 SBSQ NF CARE MODERATE MDM 30: CPT | Performed by: INTERNAL MEDICINE

## 2025-06-01 NOTE — LETTER
Patient: Lucas Valderrama  : 1950    Encounter Date: 2025    PLACE OF SERVICE:  [facility].    This is a subsequent visit.    Subjective  Patient ID: Lucas Valderrama is a 75 y.o. male who presents for Follow-up.    Mr. Lucas Valderrama is a 75-year-old male with history of CHF and COPD.  He suffers from depression.  He is unable to care for himself.  He requires supportive care.    Review of Systems   Constitutional:  Negative for chills and fever.   Cardiovascular:  Negative for chest pain.   All other systems reviewed and are negative.    Objective  /82   Pulse 82   Temp 36.6 °C (97.9 °F)   Resp 18     Physical Exam  Vitals reviewed.   Constitutional:       Comments: This is a well-developed and well-nourished male, lying in bed, appearing weak.   HENT:      Right Ear: Tympanic membrane, ear canal and external ear normal.      Left Ear: Tympanic membrane, ear canal and external ear normal.   Eyes:      General: No scleral icterus.     Pupils: Pupils are equal, round, and reactive to light.   Neck:      Vascular: No carotid bruit.   Cardiovascular:      Heart sounds: Normal heart sounds, S1 normal and S2 normal. No murmur heard.     No friction rub.   Pulmonary:      Effort: Pulmonary effort is normal.      Breath sounds: Decreased breath sounds (throughout) present.   Abdominal:      Palpations: There is no hepatomegaly, splenomegaly or mass.   Musculoskeletal:         General: No swelling or deformity. Normal range of motion.      Cervical back: Neck supple.      Right lower leg: Edema present.      Left lower leg: Edema present.   Lymphadenopathy:      Cervical: No cervical adenopathy.      Upper Body:      Right upper body: No axillary adenopathy.      Left upper body: No axillary adenopathy.      Lower Body: No right inguinal adenopathy. No left inguinal adenopathy.   Neurological:      Mental Status: He is oriented to person, place, and time. He is lethargic.       Cranial Nerves: Cranial nerves 2-12 are intact. No cranial nerve deficit.      Sensory: No sensory deficit.      Motor: Motor function is intact. No weakness.      Gait: Gait is intact.      Deep Tendon Reflexes: Reflexes normal.   Psychiatric:         Mood and Affect: Mood normal. Mood is not anxious or depressed. Affect is not angry.         Behavior: Behavior is not agitated.         Thought Content: Thought content normal.         Judgment: Judgment normal.     LAB WORK:  Laboratory studies were reviewed.    Assessment/Plan  Problem List Items Addressed This Visit           ICD-10-CM       Advance Directives and General Issues    At risk for falls Z91.81       Cardiac and Vasculature    Atrial fibrillation (Multi) I48.91    Benign hypertension I10    Congestive heart failure - Primary I50.9       Gastrointestinal and Abdominal    Gastroesophageal reflux disease without esophagitis K21.9       Mental Health    Depression F32.A    Anxiety F41.9     Other Visit Diagnoses         Codes      Chronic obstructive pulmonary disease, unspecified COPD type (Multi)     J44.9      Type 2 diabetes mellitus without complication, with long-term current use of insulin     E11.9, Z79.4      Cognitive decline     R41.89      Weakness     R53.1        1. Heart failure, on diuretic.  2. COPD, on bronchodilator therapy.  3. Diabetes on insulin.  4. Hypertension, medically controlled.  5. Atrial fibrillation, on rate control.  6. Depression, on medication.  7. Anxiety on medication.  8. GERD, on PPI.  9. Cognitive decline, on supportive care.  10. Weakness, on PT/OT.  11. Fall risk, on fall precautions.    Scribe Attestation  By signing my name below, I, Keshav Simmons attest that this documentation has been prepared under the direction and in the presence of Santiago Hernandez MD.     All medical record entries made by the scribe were personally dictated by me I have reviewed the chart and agree the record accurately reflects my  personal performance of his history physical examination and management      Electronically Signed By: Santiago Hernandez MD   6/8/25 11:55 PM

## 2025-06-05 VITALS
TEMPERATURE: 97.9 F | SYSTOLIC BLOOD PRESSURE: 130 MMHG | DIASTOLIC BLOOD PRESSURE: 82 MMHG | HEART RATE: 82 BPM | RESPIRATION RATE: 18 BRPM

## 2025-06-05 ASSESSMENT — ENCOUNTER SYMPTOMS
CHILLS: 0
FEVER: 0

## 2025-06-05 NOTE — PROGRESS NOTES
PLACE OF SERVICE:  [facility].    This is a subsequent visit.    Subjective   Patient ID: Lucas Valderrama is a 75 y.o. male who presents for Follow-up.    Mr. Lucas Valderrama is a 75-year-old male with history of CHF and COPD.  He suffers from depression.  He is unable to care for himself.  He requires supportive care.    Review of Systems   Constitutional:  Negative for chills and fever.   Cardiovascular:  Negative for chest pain.   All other systems reviewed and are negative.    Objective   /82   Pulse 82   Temp 36.6 °C (97.9 °F)   Resp 18     Physical Exam  Vitals reviewed.   Constitutional:       Comments: This is a well-developed and well-nourished male, lying in bed, appearing weak.   HENT:      Right Ear: Tympanic membrane, ear canal and external ear normal.      Left Ear: Tympanic membrane, ear canal and external ear normal.   Eyes:      General: No scleral icterus.     Pupils: Pupils are equal, round, and reactive to light.   Neck:      Vascular: No carotid bruit.   Cardiovascular:      Heart sounds: Normal heart sounds, S1 normal and S2 normal. No murmur heard.     No friction rub.   Pulmonary:      Effort: Pulmonary effort is normal.      Breath sounds: Decreased breath sounds (throughout) present.   Abdominal:      Palpations: There is no hepatomegaly, splenomegaly or mass.   Musculoskeletal:         General: No swelling or deformity. Normal range of motion.      Cervical back: Neck supple.      Right lower leg: Edema present.      Left lower leg: Edema present.   Lymphadenopathy:      Cervical: No cervical adenopathy.      Upper Body:      Right upper body: No axillary adenopathy.      Left upper body: No axillary adenopathy.      Lower Body: No right inguinal adenopathy. No left inguinal adenopathy.   Neurological:      Mental Status: He is oriented to person, place, and time. He is lethargic.      Cranial Nerves: Cranial nerves 2-12 are intact. No cranial nerve deficit.       Sensory: No sensory deficit.      Motor: Motor function is intact. No weakness.      Gait: Gait is intact.      Deep Tendon Reflexes: Reflexes normal.   Psychiatric:         Mood and Affect: Mood normal. Mood is not anxious or depressed. Affect is not angry.         Behavior: Behavior is not agitated.         Thought Content: Thought content normal.         Judgment: Judgment normal.     LAB WORK:  Laboratory studies were reviewed.    Assessment/Plan   Problem List Items Addressed This Visit           ICD-10-CM       Advance Directives and General Issues    At risk for falls Z91.81       Cardiac and Vasculature    Atrial fibrillation (Multi) I48.91    Benign hypertension I10    Congestive heart failure - Primary I50.9       Gastrointestinal and Abdominal    Gastroesophageal reflux disease without esophagitis K21.9       Mental Health    Depression F32.A    Anxiety F41.9     Other Visit Diagnoses         Codes      Chronic obstructive pulmonary disease, unspecified COPD type (Multi)     J44.9      Type 2 diabetes mellitus without complication, with long-term current use of insulin     E11.9, Z79.4      Cognitive decline     R41.89      Weakness     R53.1        1. Heart failure, on diuretic.  2. COPD, on bronchodilator therapy.  3. Diabetes on insulin.  4. Hypertension, medically controlled.  5. Atrial fibrillation, on rate control.  6. Depression, on medication.  7. Anxiety on medication.  8. GERD, on PPI.  9. Cognitive decline, on supportive care.  10. Weakness, on PT/OT.  11. Fall risk, on fall precautions.    Scribe Attestation  By signing my name below, ILauren Scribe attest that this documentation has been prepared under the direction and in the presence of Santiago Hernandez MD.     All medical record entries made by the scribe were personally dictated by me I have reviewed the chart and agree the record accurately reflects my personal performance of his history physical examination and management

## 2025-07-06 ENCOUNTER — NURSING HOME VISIT (OUTPATIENT)
Dept: POST ACUTE CARE | Facility: EXTERNAL LOCATION | Age: 75
End: 2025-07-06
Payer: MEDICARE

## 2025-07-06 DIAGNOSIS — F32.A DEPRESSION, UNSPECIFIED DEPRESSION TYPE: ICD-10-CM

## 2025-07-06 DIAGNOSIS — F41.9 ANXIETY: ICD-10-CM

## 2025-07-06 DIAGNOSIS — K21.9 GASTROESOPHAGEAL REFLUX DISEASE WITHOUT ESOPHAGITIS: ICD-10-CM

## 2025-07-06 DIAGNOSIS — E11.9 TYPE 2 DIABETES MELLITUS WITHOUT COMPLICATION, WITH LONG-TERM CURRENT USE OF INSULIN: Primary | ICD-10-CM

## 2025-07-06 DIAGNOSIS — Z79.4 TYPE 2 DIABETES MELLITUS WITHOUT COMPLICATION, WITH LONG-TERM CURRENT USE OF INSULIN: Primary | ICD-10-CM

## 2025-07-06 DIAGNOSIS — I10 BENIGN HYPERTENSION: ICD-10-CM

## 2025-07-06 DIAGNOSIS — I48.91 ATRIAL FIBRILLATION, UNSPECIFIED TYPE (MULTI): ICD-10-CM

## 2025-07-06 DIAGNOSIS — J44.9 CHRONIC OBSTRUCTIVE PULMONARY DISEASE, UNSPECIFIED COPD TYPE (MULTI): ICD-10-CM

## 2025-07-06 DIAGNOSIS — R41.89 COGNITIVE DECLINE: ICD-10-CM

## 2025-07-06 DIAGNOSIS — I50.9 CONGESTIVE HEART FAILURE, UNSPECIFIED HF CHRONICITY, UNSPECIFIED HEART FAILURE TYPE: ICD-10-CM

## 2025-07-06 DIAGNOSIS — Z91.81 AT RISK FOR FALLS: ICD-10-CM

## 2025-07-06 NOTE — LETTER
Patient: Lucas Valderrama  : 1950    Encounter Date: 2025    PLACE OF SERVICE:  [facility]    This is a subsequent visit.    Subjective  Patient ID: Lucas Valderrama is a 75 y.o. male who presents for Follow-up.    Mr. Lucas Valderrama is a 75-year-old male with history of diabetes and hypertension.  He suffers from COPD and heart failure.  He is unable to care for himself and requires supportive care.    Review of Systems   Constitutional:  Negative for chills and fever.   Cardiovascular:  Negative for chest pain.   All other systems reviewed and are negative.    Objective  /80   Pulse 78   Temp 36.8 °C (98.2 °F)   Resp 18     Physical Exam  Vitals reviewed.   Constitutional:       Comments: This is a well-developed and well-nourished male, lying in bed, appearing weak.   HENT:      Right Ear: Tympanic membrane, ear canal and external ear normal.      Left Ear: Tympanic membrane, ear canal and external ear normal.   Eyes:      General: No scleral icterus.     Pupils: Pupils are equal, round, and reactive to light.   Neck:      Vascular: No carotid bruit.   Cardiovascular:      Heart sounds: Normal heart sounds, S1 normal and S2 normal. No murmur heard.     No friction rub.   Pulmonary:      Breath sounds: Decreased breath sounds (throughout) present.   Abdominal:      Palpations: There is no hepatomegaly, splenomegaly or mass.   Musculoskeletal:         General: No swelling or deformity. Normal range of motion.      Cervical back: Neck supple.      Right lower leg: Edema (trace) present.      Left lower leg: Edema (trace) present.   Lymphadenopathy:      Cervical: No cervical adenopathy.      Upper Body:      Right upper body: No axillary adenopathy.      Left upper body: No axillary adenopathy.      Lower Body: No right inguinal adenopathy. No left inguinal adenopathy.   Neurological:      Mental Status: He is oriented to person, place, and time. He is lethargic.      Cranial  Nerves: Cranial nerves 2-12 are intact. No cranial nerve deficit.      Sensory: No sensory deficit.      Motor: Motor function is intact. No weakness.      Gait: Gait is intact.      Deep Tendon Reflexes: Reflexes normal.   Psychiatric:         Mood and Affect: Mood normal. Mood is not anxious or depressed. Affect is not angry.         Behavior: Behavior is not agitated.         Thought Content: Thought content normal.         Judgment: Judgment normal.     LAB WORK: Laboratory studies were reviewed.    Assessment/Plan  Problem List Items Addressed This Visit           ICD-10-CM       Advance Directives and General Issues    At risk for falls Z91.81       Cardiac and Vasculature    Atrial fibrillation (Multi) I48.91    Benign hypertension I10    Congestive heart failure I50.9       Gastrointestinal and Abdominal    Gastroesophageal reflux disease without esophagitis K21.9       Mental Health    Depression F32.A    Anxiety F41.9     Other Visit Diagnoses         Codes      Type 2 diabetes mellitus without complication, with long-term current use of insulin    -  Primary E11.9, Z79.4      Chronic obstructive pulmonary disease, unspecified COPD type (Multi)     J44.9      Cognitive decline     R41.89        1. Diabetes, on insulin.  2. COPD, on bronchodilator therapy.  3. Heart failure, on diuretic.  4. Atrial fibrillation, rate controlled.  5. Hypertension, medically controlled.  6. GERD, on PPI.  7. Depression, on medication.  8. Anxiety, on medication.  9. Cognitive decline, supportive care.  10. Fall risk, fall precautions.    Scribe Attestation  By signing my name below, ILauren Scribe attest that this documentation has been prepared under the direction and in the presence of Santiago Hernandez MD.     All medical record entries made by the scribe were personally dictated by me I have reviewed the chart and agree the record accurately reflects my personal performance of his history physical examination and  management      Electronically Signed By: Santiago Hernandez MD   7/11/25 10:51 PM

## 2025-07-08 VITALS
RESPIRATION RATE: 18 BRPM | SYSTOLIC BLOOD PRESSURE: 126 MMHG | DIASTOLIC BLOOD PRESSURE: 80 MMHG | HEART RATE: 78 BPM | TEMPERATURE: 98.2 F

## 2025-07-08 ASSESSMENT — ENCOUNTER SYMPTOMS
CHILLS: 0
FEVER: 0

## 2025-07-08 NOTE — PROGRESS NOTES
PLACE OF SERVICE:  [facility]    This is a subsequent visit.    Subjective   Patient ID: Lucas Valderrama is a 75 y.o. male who presents for Follow-up.    Mr. Lucas Valderrama is a 75-year-old male with history of diabetes and hypertension.  He suffers from COPD and heart failure.  He is unable to care for himself and requires supportive care.    Review of Systems   Constitutional:  Negative for chills and fever.   Cardiovascular:  Negative for chest pain.   All other systems reviewed and are negative.    Objective   /80   Pulse 78   Temp 36.8 °C (98.2 °F)   Resp 18     Physical Exam  Vitals reviewed.   Constitutional:       Comments: This is a well-developed and well-nourished male, lying in bed, appearing weak.   HENT:      Right Ear: Tympanic membrane, ear canal and external ear normal.      Left Ear: Tympanic membrane, ear canal and external ear normal.   Eyes:      General: No scleral icterus.     Pupils: Pupils are equal, round, and reactive to light.   Neck:      Vascular: No carotid bruit.   Cardiovascular:      Heart sounds: Normal heart sounds, S1 normal and S2 normal. No murmur heard.     No friction rub.   Pulmonary:      Breath sounds: Decreased breath sounds (throughout) present.   Abdominal:      Palpations: There is no hepatomegaly, splenomegaly or mass.   Musculoskeletal:         General: No swelling or deformity. Normal range of motion.      Cervical back: Neck supple.      Right lower leg: Edema (trace) present.      Left lower leg: Edema (trace) present.   Lymphadenopathy:      Cervical: No cervical adenopathy.      Upper Body:      Right upper body: No axillary adenopathy.      Left upper body: No axillary adenopathy.      Lower Body: No right inguinal adenopathy. No left inguinal adenopathy.   Neurological:      Mental Status: He is oriented to person, place, and time. He is lethargic.      Cranial Nerves: Cranial nerves 2-12 are intact. No cranial nerve deficit.       Sensory: No sensory deficit.      Motor: Motor function is intact. No weakness.      Gait: Gait is intact.      Deep Tendon Reflexes: Reflexes normal.   Psychiatric:         Mood and Affect: Mood normal. Mood is not anxious or depressed. Affect is not angry.         Behavior: Behavior is not agitated.         Thought Content: Thought content normal.         Judgment: Judgment normal.     LAB WORK: Laboratory studies were reviewed.    Assessment/Plan   Problem List Items Addressed This Visit           ICD-10-CM       Advance Directives and General Issues    At risk for falls Z91.81       Cardiac and Vasculature    Atrial fibrillation (Multi) I48.91    Benign hypertension I10    Congestive heart failure I50.9       Gastrointestinal and Abdominal    Gastroesophageal reflux disease without esophagitis K21.9       Mental Health    Depression F32.A    Anxiety F41.9     Other Visit Diagnoses         Codes      Type 2 diabetes mellitus without complication, with long-term current use of insulin    -  Primary E11.9, Z79.4      Chronic obstructive pulmonary disease, unspecified COPD type (Multi)     J44.9      Cognitive decline     R41.89        1. Diabetes, on insulin.  2. COPD, on bronchodilator therapy.  3. Heart failure, on diuretic.  4. Atrial fibrillation, rate controlled.  5. Hypertension, medically controlled.  6. GERD, on PPI.  7. Depression, on medication.  8. Anxiety, on medication.  9. Cognitive decline, supportive care.  10. Fall risk, fall precautions.    Scribe Attestation  By signing my name below, ILauren Scribe attest that this documentation has been prepared under the direction and in the presence of Santiago Hernandez MD.     All medical record entries made by the scribe were personally dictated by me I have reviewed the chart and agree the record accurately reflects my personal performance of his history physical examination and management

## 2025-08-02 ENCOUNTER — NURSING HOME VISIT (OUTPATIENT)
Dept: POST ACUTE CARE | Facility: EXTERNAL LOCATION | Age: 75
End: 2025-08-02
Payer: MEDICARE

## 2025-08-02 DIAGNOSIS — K21.9 GASTROESOPHAGEAL REFLUX DISEASE WITHOUT ESOPHAGITIS: ICD-10-CM

## 2025-08-02 DIAGNOSIS — Z91.81 AT RISK FOR FALLS: ICD-10-CM

## 2025-08-02 DIAGNOSIS — Z79.4 TYPE 2 DIABETES MELLITUS WITHOUT COMPLICATION, WITH LONG-TERM CURRENT USE OF INSULIN: ICD-10-CM

## 2025-08-02 DIAGNOSIS — I10 BENIGN HYPERTENSION: ICD-10-CM

## 2025-08-02 DIAGNOSIS — R41.89 COGNITIVE DECLINE: ICD-10-CM

## 2025-08-02 DIAGNOSIS — F32.A DEPRESSION, UNSPECIFIED DEPRESSION TYPE: ICD-10-CM

## 2025-08-02 DIAGNOSIS — I50.9 CONGESTIVE HEART FAILURE, UNSPECIFIED HF CHRONICITY, UNSPECIFIED HEART FAILURE TYPE: Primary | ICD-10-CM

## 2025-08-02 DIAGNOSIS — I48.91 ATRIAL FIBRILLATION, UNSPECIFIED TYPE (MULTI): ICD-10-CM

## 2025-08-02 DIAGNOSIS — F41.9 ANXIETY: ICD-10-CM

## 2025-08-02 DIAGNOSIS — J44.9 CHRONIC OBSTRUCTIVE PULMONARY DISEASE, UNSPECIFIED COPD TYPE (MULTI): ICD-10-CM

## 2025-08-02 DIAGNOSIS — E11.9 TYPE 2 DIABETES MELLITUS WITHOUT COMPLICATION, WITH LONG-TERM CURRENT USE OF INSULIN: ICD-10-CM

## 2025-08-02 PROCEDURE — 99309 SBSQ NF CARE MODERATE MDM 30: CPT | Performed by: INTERNAL MEDICINE

## 2025-08-17 ENCOUNTER — NURSING HOME VISIT (OUTPATIENT)
Dept: POST ACUTE CARE | Facility: EXTERNAL LOCATION | Age: 75
End: 2025-08-17
Payer: MEDICARE

## 2025-08-17 DIAGNOSIS — Z91.81 AT RISK FOR FALLS: ICD-10-CM

## 2025-08-17 DIAGNOSIS — E11.9 TYPE 2 DIABETES MELLITUS WITHOUT COMPLICATION, WITH LONG-TERM CURRENT USE OF INSULIN: ICD-10-CM

## 2025-08-17 DIAGNOSIS — Z79.4 TYPE 2 DIABETES MELLITUS WITHOUT COMPLICATION, WITH LONG-TERM CURRENT USE OF INSULIN: ICD-10-CM

## 2025-08-17 DIAGNOSIS — I50.9 CONGESTIVE HEART FAILURE, UNSPECIFIED HF CHRONICITY, UNSPECIFIED HEART FAILURE TYPE: Primary | ICD-10-CM

## 2025-08-17 DIAGNOSIS — I10 BENIGN HYPERTENSION: ICD-10-CM

## 2025-08-17 DIAGNOSIS — R41.89 COGNITIVE DECLINE: ICD-10-CM

## 2025-08-17 DIAGNOSIS — F41.9 ANXIETY: ICD-10-CM

## 2025-08-17 DIAGNOSIS — I48.91 ATRIAL FIBRILLATION, UNSPECIFIED TYPE (MULTI): ICD-10-CM

## 2025-08-17 DIAGNOSIS — J44.9 CHRONIC OBSTRUCTIVE PULMONARY DISEASE, UNSPECIFIED COPD TYPE (MULTI): ICD-10-CM

## 2025-08-17 DIAGNOSIS — K21.9 GASTROESOPHAGEAL REFLUX DISEASE WITHOUT ESOPHAGITIS: ICD-10-CM

## 2025-08-17 DIAGNOSIS — F32.A DEPRESSION, UNSPECIFIED DEPRESSION TYPE: ICD-10-CM

## 2025-08-17 PROCEDURE — 99309 SBSQ NF CARE MODERATE MDM 30: CPT | Performed by: INTERNAL MEDICINE

## 2025-08-18 VITALS
HEART RATE: 80 BPM | TEMPERATURE: 98 F | DIASTOLIC BLOOD PRESSURE: 80 MMHG | RESPIRATION RATE: 18 BRPM | SYSTOLIC BLOOD PRESSURE: 126 MMHG

## 2025-08-18 VITALS
SYSTOLIC BLOOD PRESSURE: 130 MMHG | RESPIRATION RATE: 18 BRPM | TEMPERATURE: 98.1 F | DIASTOLIC BLOOD PRESSURE: 80 MMHG | HEART RATE: 82 BPM

## 2025-08-18 ASSESSMENT — ENCOUNTER SYMPTOMS
CHILLS: 0
FEVER: 0
FEVER: 0
CHILLS: 0

## 2025-08-23 ENCOUNTER — NURSING HOME VISIT (OUTPATIENT)
Dept: POST ACUTE CARE | Facility: EXTERNAL LOCATION | Age: 75
End: 2025-08-23
Payer: MEDICARE

## 2025-08-23 DIAGNOSIS — Z79.4 TYPE 2 DIABETES MELLITUS WITHOUT COMPLICATION, WITH LONG-TERM CURRENT USE OF INSULIN: ICD-10-CM

## 2025-08-23 DIAGNOSIS — R41.89 COGNITIVE DECLINE: ICD-10-CM

## 2025-08-23 DIAGNOSIS — Z91.81 AT RISK FOR FALLS: ICD-10-CM

## 2025-08-23 DIAGNOSIS — I10 BENIGN HYPERTENSION: ICD-10-CM

## 2025-08-23 DIAGNOSIS — I50.9 CONGESTIVE HEART FAILURE, UNSPECIFIED HF CHRONICITY, UNSPECIFIED HEART FAILURE TYPE: ICD-10-CM

## 2025-08-23 DIAGNOSIS — F32.A DEPRESSION, UNSPECIFIED DEPRESSION TYPE: ICD-10-CM

## 2025-08-23 DIAGNOSIS — E11.9 TYPE 2 DIABETES MELLITUS WITHOUT COMPLICATION, WITH LONG-TERM CURRENT USE OF INSULIN: ICD-10-CM

## 2025-08-23 DIAGNOSIS — I48.91 ATRIAL FIBRILLATION, UNSPECIFIED TYPE (MULTI): ICD-10-CM

## 2025-08-23 DIAGNOSIS — K21.9 GASTROESOPHAGEAL REFLUX DISEASE WITHOUT ESOPHAGITIS: ICD-10-CM

## 2025-08-23 DIAGNOSIS — F41.9 ANXIETY: ICD-10-CM

## 2025-08-23 DIAGNOSIS — J44.9 CHRONIC OBSTRUCTIVE PULMONARY DISEASE, UNSPECIFIED COPD TYPE (MULTI): Primary | ICD-10-CM

## 2025-08-23 PROCEDURE — 99308 SBSQ NF CARE LOW MDM 20: CPT | Performed by: INTERNAL MEDICINE

## 2025-08-26 VITALS
HEART RATE: 78 BPM | DIASTOLIC BLOOD PRESSURE: 80 MMHG | SYSTOLIC BLOOD PRESSURE: 126 MMHG | TEMPERATURE: 97.9 F | RESPIRATION RATE: 18 BRPM

## 2025-08-26 ASSESSMENT — ENCOUNTER SYMPTOMS
FEVER: 0
CHILLS: 0